# Patient Record
Sex: FEMALE | Race: WHITE | Employment: OTHER
[De-identification: names, ages, dates, MRNs, and addresses within clinical notes are randomized per-mention and may not be internally consistent; named-entity substitution may affect disease eponyms.]

---

## 2017-02-02 ENCOUNTER — TELEPHONE (OUTPATIENT)
Dept: FAMILY MEDICINE CLINIC | Facility: CLINIC | Age: 57
End: 2017-02-02

## 2017-02-10 ENCOUNTER — TELEPHONE (OUTPATIENT)
Dept: PSYCHOLOGY | Facility: CLINIC | Age: 57
End: 2017-02-10

## 2017-03-03 ENCOUNTER — OFFICE VISIT (OUTPATIENT)
Dept: FAMILY MEDICINE CLINIC | Facility: CLINIC | Age: 57
End: 2017-03-03

## 2017-03-03 VITALS
DIASTOLIC BLOOD PRESSURE: 68 MMHG | BODY MASS INDEX: 25.73 KG/M2 | WEIGHT: 169.2 LBS | TEMPERATURE: 97.2 F | HEART RATE: 70 BPM | RESPIRATION RATE: 17 BRPM | SYSTOLIC BLOOD PRESSURE: 100 MMHG

## 2017-03-03 DIAGNOSIS — F17.200 TOBACCO DEPENDENCE: Primary | ICD-10-CM

## 2017-03-03 PROCEDURE — 99213 OFFICE O/P EST LOW 20 MIN: CPT | Performed by: NURSE PRACTITIONER

## 2017-03-03 RX ORDER — NICOTINE 21 MG/24HR
1 PATCH, TRANSDERMAL 24 HOURS TRANSDERMAL EVERY 24 HOURS
Qty: 30 PATCH | Refills: 0 | Status: SHIPPED | OUTPATIENT
Start: 2017-03-03 | End: 2017-06-08 | Stop reason: SINTOL

## 2017-03-03 ASSESSMENT — ENCOUNTER SYMPTOMS
WHEEZING: 0
EYE DISCHARGE: 0
SORE THROAT: 0
DIARRHEA: 0
RHINORRHEA: 0
ABDOMINAL PAIN: 0
VOMITING: 0
EYE REDNESS: 0
SHORTNESS OF BREATH: 0
COUGH: 0

## 2017-03-20 ENCOUNTER — HOSPITAL ENCOUNTER (OUTPATIENT)
Dept: ULTRASOUND IMAGING | Age: 57
Discharge: HOME | End: 2017-03-20
Payer: MEDICAID

## 2017-03-20 ENCOUNTER — HOSPITAL ENCOUNTER (OUTPATIENT)
Age: 57
Discharge: HOME | End: 2017-03-20
Payer: MEDICAID

## 2017-03-20 DIAGNOSIS — M79.89 LEG SWELLING: ICD-10-CM

## 2017-03-20 PROCEDURE — 93971 EXTREMITY STUDY: CPT

## 2017-04-18 ENCOUNTER — OFFICE VISIT (OUTPATIENT)
Dept: FAMILY MEDICINE CLINIC | Age: 57
End: 2017-04-18
Payer: MEDICAID

## 2017-04-18 ENCOUNTER — TELEPHONE (OUTPATIENT)
Dept: FAMILY MEDICINE CLINIC | Age: 57
End: 2017-04-18

## 2017-04-18 VITALS
HEART RATE: 80 BPM | TEMPERATURE: 97 F | BODY MASS INDEX: 23.95 KG/M2 | DIASTOLIC BLOOD PRESSURE: 64 MMHG | WEIGHT: 158 LBS | HEIGHT: 68 IN | RESPIRATION RATE: 20 BRPM | SYSTOLIC BLOOD PRESSURE: 90 MMHG

## 2017-04-18 DIAGNOSIS — M79.671 RIGHT FOOT PAIN: ICD-10-CM

## 2017-04-18 DIAGNOSIS — M79.671 RIGHT FOOT PAIN: Primary | ICD-10-CM

## 2017-04-18 DIAGNOSIS — M79.671 FOOT PAIN, RIGHT: Primary | ICD-10-CM

## 2017-04-18 DIAGNOSIS — Z12.39 SCREENING FOR BREAST CANCER: ICD-10-CM

## 2017-04-18 DIAGNOSIS — Z23 NEED FOR PNEUMOCOCCAL VACCINATION: ICD-10-CM

## 2017-04-18 PROCEDURE — 99213 OFFICE O/P EST LOW 20 MIN: CPT | Performed by: NURSE PRACTITIONER

## 2017-04-18 PROCEDURE — 90471 IMMUNIZATION ADMIN: CPT | Performed by: NURSE PRACTITIONER

## 2017-04-18 PROCEDURE — 90732 PPSV23 VACC 2 YRS+ SUBQ/IM: CPT | Performed by: NURSE PRACTITIONER

## 2017-04-18 RX ORDER — LACTULOSE 10 G/15ML
20 SOLUTION ORAL DAILY PRN
COMMUNITY
Start: 2017-04-18 | End: 2018-04-18

## 2017-04-24 DIAGNOSIS — Z12.39 SCREENING FOR BREAST CANCER: ICD-10-CM

## 2017-04-26 DIAGNOSIS — Z95.828 S/P TIPS (TRANSJUGULAR INTRAHEPATIC PORTOSYSTEMIC SHUNT): ICD-10-CM

## 2017-04-26 DIAGNOSIS — K70.30 ALCOHOLIC CIRRHOSIS OF LIVER WITHOUT ASCITES (HCC): ICD-10-CM

## 2017-06-08 ENCOUNTER — OFFICE VISIT (OUTPATIENT)
Dept: FAMILY MEDICINE CLINIC | Age: 57
End: 2017-06-08
Payer: MEDICAID

## 2017-06-08 VITALS
HEIGHT: 68 IN | BODY MASS INDEX: 24.71 KG/M2 | RESPIRATION RATE: 20 BRPM | HEART RATE: 88 BPM | SYSTOLIC BLOOD PRESSURE: 100 MMHG | WEIGHT: 163 LBS | DIASTOLIC BLOOD PRESSURE: 72 MMHG | TEMPERATURE: 96.3 F

## 2017-06-08 DIAGNOSIS — Q82.8 ACCESSORY SKIN TAGS: ICD-10-CM

## 2017-06-08 DIAGNOSIS — F17.200 TOBACCO DEPENDENCE: Primary | ICD-10-CM

## 2017-06-08 PROCEDURE — 99213 OFFICE O/P EST LOW 20 MIN: CPT | Performed by: NURSE PRACTITIONER

## 2017-06-08 RX ORDER — VARENICLINE TARTRATE 25 MG
KIT ORAL
Qty: 42 TABLET | Refills: 0 | Status: SHIPPED | OUTPATIENT
Start: 2017-06-08 | End: 2017-08-02 | Stop reason: SDUPTHER

## 2017-06-08 ASSESSMENT — ENCOUNTER SYMPTOMS
WHEEZING: 0
EYE DISCHARGE: 0
TROUBLE SWALLOWING: 0
SHORTNESS OF BREATH: 0
EYE REDNESS: 0
COUGH: 0
SORE THROAT: 0
RHINORRHEA: 0

## 2017-06-08 ASSESSMENT — PATIENT HEALTH QUESTIONNAIRE - PHQ9
SUM OF ALL RESPONSES TO PHQ QUESTIONS 1-9: 0
1. LITTLE INTEREST OR PLEASURE IN DOING THINGS: 0
SUM OF ALL RESPONSES TO PHQ9 QUESTIONS 1 & 2: 0
2. FEELING DOWN, DEPRESSED OR HOPELESS: 0

## 2017-06-29 ENCOUNTER — OFFICE VISIT (OUTPATIENT)
Dept: FAMILY MEDICINE CLINIC | Age: 57
End: 2017-06-29
Payer: MEDICAID

## 2017-06-29 VITALS
HEART RATE: 74 BPM | WEIGHT: 162 LBS | RESPIRATION RATE: 16 BRPM | BODY MASS INDEX: 24.55 KG/M2 | SYSTOLIC BLOOD PRESSURE: 100 MMHG | HEIGHT: 68 IN | DIASTOLIC BLOOD PRESSURE: 68 MMHG

## 2017-06-29 DIAGNOSIS — D17.9 MULTIPLE LIPOMAS: Primary | ICD-10-CM

## 2017-06-29 PROCEDURE — 99212 OFFICE O/P EST SF 10 MIN: CPT | Performed by: INTERNAL MEDICINE

## 2017-06-29 RX ORDER — ALENDRONATE SODIUM 70 MG/1
70 TABLET ORAL
Qty: 4 TABLET | Refills: 3 | Status: CANCELLED | OUTPATIENT
Start: 2017-06-29

## 2017-06-29 RX ORDER — ALENDRONATE SODIUM 70 MG/1
70 TABLET ORAL
Qty: 4 TABLET | Refills: 3 | Status: ON HOLD | OUTPATIENT
Start: 2017-06-29 | End: 2022-01-19

## 2017-06-29 ASSESSMENT — ENCOUNTER SYMPTOMS
SHORTNESS OF BREATH: 0
COUGH: 0

## 2017-08-02 ENCOUNTER — OFFICE VISIT (OUTPATIENT)
Dept: FAMILY MEDICINE CLINIC | Age: 57
End: 2017-08-02
Payer: MEDICAID

## 2017-08-02 VITALS
SYSTOLIC BLOOD PRESSURE: 101 MMHG | WEIGHT: 163 LBS | OXYGEN SATURATION: 95 % | BODY MASS INDEX: 24.71 KG/M2 | RESPIRATION RATE: 20 BRPM | HEIGHT: 68 IN | DIASTOLIC BLOOD PRESSURE: 65 MMHG | TEMPERATURE: 97.6 F | HEART RATE: 80 BPM

## 2017-08-02 DIAGNOSIS — F17.200 TOBACCO DEPENDENCE: ICD-10-CM

## 2017-08-02 DIAGNOSIS — Q82.8 ACCESSORY SKIN TAGS: Primary | ICD-10-CM

## 2017-08-02 PROCEDURE — 99213 OFFICE O/P EST LOW 20 MIN: CPT | Performed by: NURSE PRACTITIONER

## 2017-08-02 PROCEDURE — 17110 DESTRUCTION B9 LES UP TO 14: CPT | Performed by: NURSE PRACTITIONER

## 2017-08-02 RX ORDER — VARENICLINE TARTRATE
KIT
Qty: 42 TABLET | Refills: 0 | OUTPATIENT
Start: 2017-08-02 | End: 2018-08-02

## 2017-08-02 RX ORDER — VARENICLINE TARTRATE 25 MG
KIT ORAL
Qty: 42 TABLET | Refills: 0 | Status: SHIPPED | OUTPATIENT
Start: 2017-08-02 | End: 2018-02-05

## 2017-08-02 ASSESSMENT — ENCOUNTER SYMPTOMS
SHORTNESS OF BREATH: 0
WHEEZING: 0
COUGH: 0

## 2018-01-08 ENCOUNTER — OFFICE VISIT (OUTPATIENT)
Dept: FAMILY MEDICINE CLINIC | Age: 58
End: 2018-01-08
Payer: MEDICAID

## 2018-01-08 VITALS
BODY MASS INDEX: 25.07 KG/M2 | OXYGEN SATURATION: 98 % | DIASTOLIC BLOOD PRESSURE: 64 MMHG | HEART RATE: 84 BPM | WEIGHT: 165.4 LBS | HEIGHT: 68 IN | TEMPERATURE: 97.8 F | SYSTOLIC BLOOD PRESSURE: 102 MMHG

## 2018-01-08 DIAGNOSIS — H00.015 HORDEOLUM EXTERNUM OF LEFT LOWER EYELID: Primary | ICD-10-CM

## 2018-01-08 PROCEDURE — 4004F PT TOBACCO SCREEN RCVD TLK: CPT | Performed by: NURSE PRACTITIONER

## 2018-01-08 PROCEDURE — G8484 FLU IMMUNIZE NO ADMIN: HCPCS | Performed by: NURSE PRACTITIONER

## 2018-01-08 PROCEDURE — 3014F SCREEN MAMMO DOC REV: CPT | Performed by: NURSE PRACTITIONER

## 2018-01-08 PROCEDURE — 99213 OFFICE O/P EST LOW 20 MIN: CPT | Performed by: NURSE PRACTITIONER

## 2018-01-08 PROCEDURE — G8427 DOCREV CUR MEDS BY ELIG CLIN: HCPCS | Performed by: NURSE PRACTITIONER

## 2018-01-08 PROCEDURE — G8419 CALC BMI OUT NRM PARAM NOF/U: HCPCS | Performed by: NURSE PRACTITIONER

## 2018-01-08 PROCEDURE — 3017F COLORECTAL CA SCREEN DOC REV: CPT | Performed by: NURSE PRACTITIONER

## 2018-01-08 RX ORDER — ERYTHROMYCIN 5 MG/G
OINTMENT OPHTHALMIC 3 TIMES DAILY
Qty: 1 TUBE | Refills: 0 | Status: SHIPPED | OUTPATIENT
Start: 2018-01-08 | End: 2018-01-15

## 2018-01-08 ASSESSMENT — ENCOUNTER SYMPTOMS
NAUSEA: 0
VOMITING: 0
EYE ITCHING: 1
SORE THROAT: 0
COUGH: 0
EYE DISCHARGE: 0
SHORTNESS OF BREATH: 0
SWOLLEN GLANDS: 0
EYE PAIN: 1
CHEST TIGHTNESS: 0
EYE REDNESS: 1
ABDOMINAL PAIN: 0
RHINORRHEA: 0
ALLERGIC/IMMUNOLOGIC NEGATIVE: 1
STRIDOR: 0
DIARRHEA: 0
DOUBLE VISION: 0
PHOTOPHOBIA: 1

## 2018-01-08 NOTE — PROGRESS NOTES
[id number]  [address] [place] [id number]  Dept: [phone]  Dept Fax: [phone]    Nannette Mccauley is a 66 y.o. female who presents today for her medical conditions/complaints as noted below. Nannette Mccauley is c/o of   Chief Complaint   Patient presents with    Conjunctivitis     watery itchy    Stye         HPI:     Conjunctivitis    The current episode started 2 days ago (L eye ). The onset was sudden. The problem occurs rarely. The problem has been unchanged. The problem is moderate. Relieved by: warm compress  The symptoms are aggravated by activity. Associated symptoms include eye itching, photophobia, eye pain and eye redness. Pertinent negatives include no fever, no decreased vision, no double vision, no abdominal pain, no diarrhea, no nausea, no vomiting, no congestion, no ear discharge, no ear pain, no headaches, no hearing loss, no mouth sores, no rhinorrhea, no sore throat, no stridor, no swollen glands, no neck pain, no cough, no URI, no rash and no eye discharge.          Past Medical History:   Diagnosis Date    Alcohol abuse, unspecified     Alcoholic cirrhosis of liver (HCC)     Anxiety     Asthma     Bipolar disorder ([address]     Cirrhosis of liver ([address]     Constipation     Depression     GERD (gastroesophageal reflux disease)     Hepatic encephalopathy (HCC)     Hepatitis C     Pancreatitis, alcoholic     Pulmonary hypertension     Seizures ([address]     5 in the past related to alcohol withdrawal      Past Surgical History:   Procedure Laterality Date     SECTION      CHOLECYSTECTOMY      COLONOSCOPY  14    NORMAL    HYSTERECTOMY      KNEE ARTHROSCOPY Left 2016    Arthroscopic Chondroplasty, Lateral meniscectomy    UPPER GASTROINTESTINAL ENDOSCOPY  14    SCHATZKI RING, MILD CHRONIC GASTRITIS    UPPER GASTROINTESTINAL ENDOSCOPY  2015    MILD CHRONIC GASTRITIS       Family History Problem Relation Age of Onset    Cancer Father      Bladder    Cancer Brother        Social History   Substance Use Topics    Smoking status: Current Every Day Smoker     Packs/day: 0.25     Years: 33.00     Types: Cigarettes    Smokeless tobacco: Never Used      Comment: about 5 cigs a day, trying to quit    Alcohol use 0.0 oz/week      Comment: 05/11/15      Current Outpatient Prescriptions   Medication Sig Dispense Refill    erythromycin (ROMYCIN) 5 MG/GM ophthalmic ointment Place into the left eye 3 times daily for 7 days 1 Tube 0    varenicline (CHANTIX STARTING MONTH PAK) 0.5 MG X 11 & 1 MG X 42 tablet Take by mouth. 42 tablet 0    alendronate (FOSAMAX) 70 MG tablet Take 1 tablet by mouth every 7 days 4 tablet 3    lactulose (CHRONULAC) 10 GM/15ML solution Take 20 g by mouth daily as needed      bisacodyl (BISAC-EVAC) 5 MG EC tablet Take 1 tablet by mouth daily as needed for Constipation 30 tablet 3    oxybutynin (DITROPAN XL) 10 MG CR tablet Take 1 tablet by mouth daily 30 tablet 5    calcium carbonate 600 MG TABS tablet Take 1 tablet by mouth daily      vitamin B-12 (CYANOCOBALAMIN) 1000 MCG tablet Take 1,000 mcg by mouth daily      furosemide (LASIX) 40 MG tablet Take 1 tablet by mouth daily 60 tablet 3    spironolactone (ALDACTONE) 100 MG tablet Take 1 tablet by mouth daily 30 tablet 3    magnesium oxide (MAG-OX) 400 MG tablet Take 400 mg by mouth 2 times daily. 2 tabs twice a day      traZODone (DESYREL) 50 MG tablet Take 1 tablet by mouth nightly as needed for Sleep. (Patient taking differently: Take 100 mg by mouth nightly as needed for Sleep ) 30 tablet 0    QUEtiapine (SEROQUEL) 300 MG tablet Take 1 tablet by mouth nightly. 30 tablet 0     No current facility-administered medications for this visit. Allergies   Allergen Reactions    Penicillins            Subjective:      Review of Systems   Constitutional: Negative for appetite change, chills, fatigue and fever.    HENT: Negative for congestion, ear discharge, ear pain, hearing loss, mouth sores, postnasal drip, rhinorrhea and sore throat. Eyes: Positive for photophobia, pain, redness and itching. Negative for double vision and discharge. Respiratory: Negative for cough, chest tightness, shortness of breath and stridor. Cardiovascular: Negative for chest pain, palpitations and leg swelling. Gastrointestinal: Negative for abdominal pain, diarrhea, nausea and vomiting. Endocrine: Negative. Genitourinary: Negative for dysuria, frequency and urgency. Musculoskeletal: Negative for neck pain and neck stiffness. Skin: Negative for rash. Allergic/Immunologic: Negative. Neurological: Negative for dizziness, weakness, light-headedness and headaches. Hematological: Negative for adenopathy. Psychiatric/Behavioral: Negative for confusion. Objective:     Physical Exam   Constitutional: She is oriented to person, place, and time. Vital signs are normal. She appears well-developed and well-nourished. HENT:   Head: Normocephalic. Right Ear: External ear normal.   Left Ear: External ear normal.   Nose: Nose normal.   Mouth/Throat: Oropharynx is clear and moist.   Eyes: EOM are normal. Pupils are equal, round, and reactive to light. Left eye exhibits hordeolum. Left conjunctiva is injected. Left eye exhibits no nystagmus. Left pupil is round and reactive. Pupils are equal.   Neck: Normal range of motion. Neck supple. Cardiovascular: Normal rate, regular rhythm and normal heart sounds. Exam reveals no gallop and no friction rub. No murmur heard. Pulmonary/Chest: Effort normal and breath sounds normal. No respiratory distress. Abdominal: Soft. Bowel sounds are normal.   Musculoskeletal: Normal range of motion. Lymphadenopathy:     She has no cervical adenopathy. Neurological: She is alert and oriented to person, place, and time. She has normal reflexes. No cranial nerve deficit.  Coordination normal. Skin: Skin is warm and dry. No rash noted. Psychiatric: She has a normal mood and affect. Thought content normal.   Vitals reviewed. /64   Pulse 84   Temp 97.8 °F (36.6 °C) (Oral)   Ht 5' 8\" (1.727 m)   Wt 165 lb 6.4 oz (75 kg)   LMP 07/19/2006   SpO2 98%   PF 97 L/min   BMI 25.15 kg/m²     Assessment:      1. Hordeolum externum of left lower eyelid  erythromycin (ROMYCIN) 5 MG/GM ophthalmic ointment             Plan:     1.) Continue with warm compresses   2.) Tylenol or Motrin PRN  3.) Start Romycin   4.) Follow-up with PCP for persistent or worsening of symptoms     Problem List     None           Patient given educational materials - see patient instructions. Discussed use, benefit, and side effects of prescribed medications. All patient questions answered. Pt verbalized understanding. Instructed to continue current medications, diet and exercise. Patient agreed with treatment plan. Follow up as directed.      Electronically signed by Doris Rios CNP on 1/8/2018 at 3:55 PM

## 2018-01-29 ENCOUNTER — TELEPHONE (OUTPATIENT)
Dept: FAMILY MEDICINE CLINIC | Age: 58
End: 2018-01-29

## 2018-01-29 NOTE — TELEPHONE ENCOUNTER
Will need to be seen in the office for pain medication-recommend discussing at upcoming appointment.

## 2018-02-05 ENCOUNTER — HOSPITAL ENCOUNTER (OUTPATIENT)
Age: 58
Setting detail: SPECIMEN
Discharge: HOME | End: 2018-02-05
Payer: MEDICAID

## 2018-02-05 ENCOUNTER — OFFICE VISIT (OUTPATIENT)
Dept: FAMILY MEDICINE CLINIC | Age: 58
End: 2018-02-05
Payer: MEDICAID

## 2018-02-05 VITALS
TEMPERATURE: 98.8 F | BODY MASS INDEX: 25.33 KG/M2 | RESPIRATION RATE: 16 BRPM | HEART RATE: 84 BPM | SYSTOLIC BLOOD PRESSURE: 110 MMHG | WEIGHT: 166.6 LBS | DIASTOLIC BLOOD PRESSURE: 60 MMHG

## 2018-02-05 DIAGNOSIS — K85.20 ALCOHOL-INDUCED ACUTE PANCREATITIS, UNSPECIFIED COMPLICATION STATUS: Primary | ICD-10-CM

## 2018-02-05 DIAGNOSIS — R10.84 GENERALIZED ABDOMINAL PAIN: ICD-10-CM

## 2018-02-05 DIAGNOSIS — K59.00 CONSTIPATION, UNSPECIFIED CONSTIPATION TYPE: ICD-10-CM

## 2018-02-05 DIAGNOSIS — R79.89 ELEVATED LFTS: ICD-10-CM

## 2018-02-05 DIAGNOSIS — N30.00 ACUTE CYSTITIS WITHOUT HEMATURIA: ICD-10-CM

## 2018-02-05 DIAGNOSIS — D69.6 THROMBOCYTOPENIA (HCC): ICD-10-CM

## 2018-02-05 DIAGNOSIS — K70.30 ALCOHOLIC CIRRHOSIS OF LIVER WITHOUT ASCITES (HCC): ICD-10-CM

## 2018-02-05 DIAGNOSIS — B18.2 CHRONIC HEPATITIS C WITHOUT HEPATIC COMA (HCC): Chronic | ICD-10-CM

## 2018-02-05 LAB
BILIRUBIN URINE: NEGATIVE
COLOR: YELLOW
COMMENT UA: NORMAL
GLUCOSE URINE: NEGATIVE
KETONES, URINE: NEGATIVE
LEUKOCYTE ESTERASE, URINE: NEGATIVE
NITRITE, URINE: NEGATIVE
PH UA: 7 (ref 5–8)
PROTEIN UA: NEGATIVE
SPECIFIC GRAVITY UA: 1.01 (ref 1–1.03)
TURBIDITY: CLEAR
URINE HGB: NEGATIVE
UROBILINOGEN, URINE: NORMAL

## 2018-02-05 PROCEDURE — 1111F DSCHRG MED/CURRENT MED MERGE: CPT | Performed by: NURSE PRACTITIONER

## 2018-02-05 PROCEDURE — 99495 TRANSJ CARE MGMT MOD F2F 14D: CPT | Performed by: NURSE PRACTITIONER

## 2018-02-05 RX ORDER — TRAZODONE HYDROCHLORIDE 100 MG/1
TABLET ORAL
Refills: 0 | Status: ON HOLD | COMMUNITY
Start: 2018-01-30 | End: 2019-06-26 | Stop reason: ALTCHOICE

## 2018-02-05 RX ORDER — DOCUSATE SODIUM 100 MG/1
100 CAPSULE, LIQUID FILLED ORAL 3 TIMES DAILY PRN
Qty: 60 CAPSULE | Refills: 1 | Status: SHIPPED | OUTPATIENT
Start: 2018-02-05 | End: 2018-04-06

## 2018-02-05 RX ORDER — IBUPROFEN 800 MG/1
800 TABLET ORAL EVERY 8 HOURS PRN
Qty: 90 TABLET | Refills: 0 | Status: ON HOLD | OUTPATIENT
Start: 2018-02-05 | End: 2022-01-21 | Stop reason: HOSPADM

## 2018-02-05 ASSESSMENT — ENCOUNTER SYMPTOMS
ABDOMINAL PAIN: 1
EYE REDNESS: 0
COUGH: 0
SHORTNESS OF BREATH: 0
WHEEZING: 0
EYE DISCHARGE: 0
VOMITING: 0
NAUSEA: 0
CONSTIPATION: 1
SORE THROAT: 0
BLOOD IN STOOL: 0
EYE PAIN: 0
SINUS PAIN: 0
DIARRHEA: 0

## 2018-02-05 NOTE — PROGRESS NOTES
110/60   01/08/18 102/64   08/02/17 101/65        Inpatient course: Discharge summary reviewed- see chart. Chief Complaint   Patient presents with    Follow-Up from Hospital     History of Present illness - Follow up of Hospital diagnosis(es): patient presents in office today for hospital follow up. Went to [facility] ED on 1/23/18 and discharged home on 1/26/18. Presented to ER with concerns about abdominal pains mostly to upper mid left stomach. History of pancreatitis and alcohol cirrhosis. She was not discharged home with any pain medication. Sister gave her few ibuprofen 800 mg which she has been taking at bedtime to help her sleep. No fevers. Has not scheduled follow up with GI. Last saw about 4 months ago. She had been drinking beer for about 1 week prior to symptoms starting. Reports drinking 2 Tall boys per day. Last alcoholic beverage day before she was admitted. LFTs very high but did trend down. CT abdomen/pelvis essentially normal. Had MRCP which was essentially normal. Had positive Hepatitis C antibody. RNA results were pending at discharge. No results available in office. Was found to have UTI and treated with Rocephin. Discharged home with Keflex which she has completed. Last dose yesterday. Denies urinary symptoms. Has not been able to eat spicy foods. Cannot drink [employer]. Drinking 7 Up or grape pop with vanilla ice cream.       Non face to face  following discharge, date last encounter closed (first attempt may have been earlier): 1/29/2018  3:24 PM    Call initiated 2 business days of discharge: Yes     Interval history/Current status: stable    Review of Systems   Constitutional: Negative for chills, fever and weight loss. HENT: Negative for ear pain, sinus pain and sore throat. Eyes: Negative for pain, discharge and redness. Respiratory: Negative for cough, shortness of breath and wheezing. Cardiovascular: Negative for chest pain, palpitations and leg swelling.    Gastrointestinal: lipase to ensure normal or at least improving if high in hospital  Discussed importance of no alcohol    Alcoholic cirrhosis of liver without ascites ([facility]      Discussed referral to counselor or behavioral specialist to help control alcohol drinking. She does not feel she needs at this time. Follow up with GI      Acute cystitis without hematuria    Completed Keflex as prescribed by hospital  Will repeat UA/culture to ensure UTI resolved  Ensure adequate fluid intake  UA W/Reflex Culture; Future  -     CT DISCHARGE MEDS RECONCILED W/ CURRENT OUTPATIENT MED LIST    Generalized abdominal pain  -     Hepatic Function Panel; Future  Will use ibuprofen as needed for pain. No narcotic pain medication at this time. Follow up with GI- needs to schedule appt. Elevated LFTs  -     Hepatic Function Panel; Future- repeat testing to ensure continues to titrate down  Limit tylenol and alcohol     Chronic hepatitis C without hepatic coma (HCC)     Stable. Will monitor symptoms. Recommend follow up with GI. Discussed importance of no alcohol   No lab results available in office today. Will obtain and review. Thrombocytopenia ([facility]  -     CBC;  Future      Constipation   Colace and Bisacodyl as needed   Recommend adequate fluid intake   Follow up with GI      Medical Decision Making: moderate complexity

## 2018-02-15 ENCOUNTER — HOSPITAL ENCOUNTER (OUTPATIENT)
Age: 58
Setting detail: SPECIMEN
Discharge: HOME | End: 2018-02-15
Payer: MEDICAID

## 2018-02-15 DIAGNOSIS — R79.89 ELEVATED LFTS: ICD-10-CM

## 2018-02-15 DIAGNOSIS — R10.84 GENERALIZED ABDOMINAL PAIN: ICD-10-CM

## 2018-02-15 DIAGNOSIS — K85.20 ALCOHOL-INDUCED ACUTE PANCREATITIS, UNSPECIFIED COMPLICATION STATUS: ICD-10-CM

## 2018-02-15 DIAGNOSIS — D69.6 THROMBOCYTOPENIA (HCC): ICD-10-CM

## 2018-02-15 LAB
ABSOLUTE EOS #: 0.25 K/UL (ref 0–0.44)
ABSOLUTE IMMATURE GRANULOCYTE: <0.03 K/UL (ref 0–0.3)
ABSOLUTE LYMPH #: 1.02 K/UL (ref 1.1–3.7)
ABSOLUTE MONO #: 0.37 K/UL (ref 0.1–1.2)
ALBUMIN SERPL-MCNC: 3.6 G/DL (ref 3.5–5.2)
ALBUMIN SERPL-MCNC: 3.6 G/DL (ref 3.5–5.2)
ALBUMIN/GLOBULIN RATIO: 1 (ref 1–2.5)
ALBUMIN/GLOBULIN RATIO: 1.1 (ref 1–2.5)
ALP BLD-CCNC: 211 U/L (ref 35–104)
ALP BLD-CCNC: 218 U/L (ref 35–104)
ALT SERPL-CCNC: 22 U/L (ref 5–33)
ALT SERPL-CCNC: 22 U/L (ref 5–33)
AMYLASE: 194 U/L (ref 28–100)
ANION GAP SERPL CALCULATED.3IONS-SCNC: 22 MMOL/L (ref 9–17)
AST SERPL-CCNC: 38 U/L
AST SERPL-CCNC: 39 U/L
BASOPHILS # BLD: 1 % (ref 0–2)
BASOPHILS ABSOLUTE: 0.03 K/UL (ref 0–0.2)
BILIRUB SERPL-MCNC: 1.92 MG/DL (ref 0.3–1.2)
BILIRUB SERPL-MCNC: 1.95 MG/DL (ref 0.3–1.2)
BILIRUBIN DIRECT: 0.93 MG/DL
BILIRUBIN, INDIRECT: 1.02 MG/DL (ref 0–1)
BUN BLDV-MCNC: 18 MG/DL (ref 6–20)
BUN/CREAT BLD: ABNORMAL (ref 9–20)
CALCIUM SERPL-MCNC: 9.7 MG/DL (ref 8.6–10.4)
CHLORIDE BLD-SCNC: 112 MMOL/L (ref 98–107)
CHOLESTEROL, FASTING: 166 MG/DL
CHOLESTEROL/HDL RATIO: 2.5
CO2: 15 MMOL/L (ref 20–31)
CREAT SERPL-MCNC: 0.82 MG/DL (ref 0.5–0.9)
DIFFERENTIAL TYPE: ABNORMAL
EOSINOPHILS RELATIVE PERCENT: 6 % (ref 1–4)
ESTIMATED AVERAGE GLUCOSE: 94 MG/DL
GFR AFRICAN AMERICAN: >60 ML/MIN
GFR NON-AFRICAN AMERICAN: >60 ML/MIN
GFR SERPL CREATININE-BSD FRML MDRD: ABNORMAL ML/MIN/{1.73_M2}
GFR SERPL CREATININE-BSD FRML MDRD: ABNORMAL ML/MIN/{1.73_M2}
GLOBULIN: ABNORMAL G/DL (ref 1.5–3.8)
GLUCOSE FASTING: 94 MG/DL (ref 70–99)
HBA1C MFR BLD: 4.9 % (ref 4–6)
HCT VFR BLD CALC: 42.8 % (ref 36.3–47.1)
HCT VFR BLD CALC: 42.8 % (ref 36.3–47.1)
HDLC SERPL-MCNC: 66 MG/DL
HEMOGLOBIN: 14.9 G/DL (ref 11.9–15.1)
HEMOGLOBIN: 14.9 G/DL (ref 11.9–15.1)
IMMATURE GRANULOCYTES: 1 %
LDL CHOLESTEROL: 81 MG/DL (ref 0–130)
LIPASE: 93 U/L (ref 13–60)
LYMPHOCYTES # BLD: 25 % (ref 24–43)
MCH RBC QN AUTO: 34.5 PG (ref 25.2–33.5)
MCH RBC QN AUTO: 34.5 PG (ref 25.2–33.5)
MCHC RBC AUTO-ENTMCNC: 34.8 G/DL (ref 28.4–34.8)
MCHC RBC AUTO-ENTMCNC: 34.8 G/DL (ref 28.4–34.8)
MCV RBC AUTO: 99.1 FL (ref 82.6–102.9)
MCV RBC AUTO: 99.1 FL (ref 82.6–102.9)
MONOCYTES # BLD: 9 % (ref 3–12)
NRBC AUTOMATED: 0 PER 100 WBC
NRBC AUTOMATED: 0 PER 100 WBC
PDW BLD-RTO: 11.9 % (ref 11.8–14.4)
PDW BLD-RTO: 11.9 % (ref 11.8–14.4)
PLATELET # BLD: 95 K/UL (ref 138–453)
PLATELET # BLD: 95 K/UL (ref 138–453)
PLATELET ESTIMATE: ABNORMAL
PMV BLD AUTO: 10.3 FL (ref 8.1–13.5)
PMV BLD AUTO: 10.3 FL (ref 8.1–13.5)
POTASSIUM SERPL-SCNC: 4.8 MMOL/L (ref 3.7–5.3)
RBC # BLD: 4.32 M/UL (ref 3.95–5.11)
RBC # BLD: 4.32 M/UL (ref 3.95–5.11)
RBC # BLD: ABNORMAL 10*6/UL
SEG NEUTROPHILS: 58 % (ref 36–65)
SEGMENTED NEUTROPHILS ABSOLUTE COUNT: 2.38 K/UL (ref 1.5–8.1)
SODIUM BLD-SCNC: 149 MMOL/L (ref 135–144)
TOTAL PROTEIN: 6.9 G/DL (ref 6.4–8.3)
TOTAL PROTEIN: 7.3 G/DL (ref 6.4–8.3)
TRIGLYCERIDE, FASTING: 96 MG/DL
TSH SERPL DL<=0.05 MIU/L-ACNC: 2.76 MIU/L (ref 0.3–5)
VLDLC SERPL CALC-MCNC: NORMAL MG/DL (ref 1–30)
WBC # BLD: 4.1 K/UL (ref 3.5–11.3)
WBC # BLD: 4.1 K/UL (ref 3.5–11.3)
WBC # BLD: ABNORMAL 10*3/UL

## 2018-02-20 ENCOUNTER — OFFICE VISIT (OUTPATIENT)
Dept: GASTROENTEROLOGY | Age: 58
End: 2018-02-20
Payer: MEDICAID

## 2018-02-20 VITALS
HEIGHT: 68 IN | SYSTOLIC BLOOD PRESSURE: 110 MMHG | DIASTOLIC BLOOD PRESSURE: 66 MMHG | HEART RATE: 77 BPM | BODY MASS INDEX: 25.31 KG/M2 | WEIGHT: 167 LBS | RESPIRATION RATE: 14 BRPM | OXYGEN SATURATION: 97 %

## 2018-02-20 DIAGNOSIS — R93.5 ABNORMAL MRI OF ABDOMEN: ICD-10-CM

## 2018-02-20 DIAGNOSIS — B18.2 CHRONIC HEPATITIS C WITHOUT HEPATIC COMA (HCC): ICD-10-CM

## 2018-02-20 DIAGNOSIS — Z95.828 S/P TIPS (TRANSJUGULAR INTRAHEPATIC PORTOSYSTEMIC SHUNT): ICD-10-CM

## 2018-02-20 DIAGNOSIS — K70.30 ALCOHOLIC CIRRHOSIS OF LIVER WITHOUT ASCITES (HCC): Primary | ICD-10-CM

## 2018-02-20 DIAGNOSIS — K59.09 OTHER CONSTIPATION: ICD-10-CM

## 2018-02-20 DIAGNOSIS — K86.1 CHRONIC PANCREATITIS, UNSPECIFIED PANCREATITIS TYPE (HCC): ICD-10-CM

## 2018-02-20 PROCEDURE — G8484 FLU IMMUNIZE NO ADMIN: HCPCS | Performed by: INTERNAL MEDICINE

## 2018-02-20 PROCEDURE — G8427 DOCREV CUR MEDS BY ELIG CLIN: HCPCS | Performed by: INTERNAL MEDICINE

## 2018-02-20 PROCEDURE — G8419 CALC BMI OUT NRM PARAM NOF/U: HCPCS | Performed by: INTERNAL MEDICINE

## 2018-02-20 PROCEDURE — 4004F PT TOBACCO SCREEN RCVD TLK: CPT | Performed by: INTERNAL MEDICINE

## 2018-02-20 PROCEDURE — 99214 OFFICE O/P EST MOD 30 MIN: CPT | Performed by: INTERNAL MEDICINE

## 2018-02-20 PROCEDURE — 3014F SCREEN MAMMO DOC REV: CPT | Performed by: INTERNAL MEDICINE

## 2018-02-20 PROCEDURE — 3017F COLORECTAL CA SCREEN DOC REV: CPT | Performed by: INTERNAL MEDICINE

## 2018-02-20 ASSESSMENT — ENCOUNTER SYMPTOMS
SHORTNESS OF BREATH: 0
WHEEZING: 0
ABDOMINAL PAIN: 1
BACK PAIN: 0
COUGH: 0
TROUBLE SWALLOWING: 0
ABDOMINAL DISTENTION: 1
CONSTIPATION: 0
EYES NEGATIVE: 1
BLOOD IN STOOL: 0
ALLERGIC/IMMUNOLOGIC NEGATIVE: 1
RECTAL PAIN: 0
DIARRHEA: 0
VOMITING: 0
RESPIRATORY NEGATIVE: 1
ANAL BLEEDING: 0
SINUS PRESSURE: 0
NAUSEA: 0

## 2018-02-21 ENCOUNTER — TELEPHONE (OUTPATIENT)
Dept: GASTROENTEROLOGY | Age: 58
End: 2018-02-21

## 2018-03-08 ENCOUNTER — APPOINTMENT (OUTPATIENT)
Dept: GENERAL RADIOLOGY | Age: 58
End: 2018-03-08
Attending: INTERNAL MEDICINE
Payer: MEDICAID

## 2018-03-08 ENCOUNTER — HOSPITAL ENCOUNTER (OUTPATIENT)
Age: 58
Discharge: HOME | End: 2018-03-08
Attending: INTERNAL MEDICINE
Payer: MEDICAID

## 2018-03-08 ENCOUNTER — ANESTHESIA (OUTPATIENT)
Dept: OPERATING ROOM | Age: 58
End: 2018-03-08
Payer: MEDICAID

## 2018-03-08 ENCOUNTER — HOSPITAL ENCOUNTER (OUTPATIENT)
Age: 58
Setting detail: SURGERY ADMIT
Discharge: HOME | End: 2018-03-08
Attending: INTERNAL MEDICINE | Admitting: INTERNAL MEDICINE
Payer: MEDICAID

## 2018-03-08 ENCOUNTER — ANESTHESIA EVENT (OUTPATIENT)
Dept: OPERATING ROOM | Age: 58
End: 2018-03-08
Payer: MEDICAID

## 2018-03-08 VITALS
WEIGHT: 167 LBS | RESPIRATION RATE: 16 BRPM | OXYGEN SATURATION: 92 % | DIASTOLIC BLOOD PRESSURE: 68 MMHG | BODY MASS INDEX: 25.31 KG/M2 | SYSTOLIC BLOOD PRESSURE: 123 MMHG | TEMPERATURE: 96.5 F | HEART RATE: 99 BPM | HEIGHT: 68 IN

## 2018-03-08 VITALS
RESPIRATION RATE: 9 BRPM | OXYGEN SATURATION: 99 % | SYSTOLIC BLOOD PRESSURE: 97 MMHG | DIASTOLIC BLOOD PRESSURE: 51 MMHG

## 2018-03-08 DIAGNOSIS — B18.2 CHRONIC HEPATITIS C WITHOUT HEPATIC COMA (HCC): ICD-10-CM

## 2018-03-08 DIAGNOSIS — R52 PAIN: ICD-10-CM

## 2018-03-08 LAB
ABSOLUTE EOS #: 0.03 K/UL (ref 0–0.4)
ABSOLUTE IMMATURE GRANULOCYTE: ABNORMAL K/UL (ref 0–0.3)
ABSOLUTE LYMPH #: 0.71 K/UL (ref 1–4.8)
ABSOLUTE MONO #: 0.41 K/UL (ref 0.1–1.3)
AFP: 4.1 UG/L
ALBUMIN SERPL-MCNC: 4 G/DL (ref 3.5–5.2)
ALBUMIN/GLOBULIN RATIO: ABNORMAL (ref 1–2.5)
ALP BLD-CCNC: 314 U/L (ref 35–104)
ALT SERPL-CCNC: 20 U/L (ref 5–33)
AST SERPL-CCNC: 35 U/L
BASOPHILS # BLD: 0 % (ref 0–2)
BASOPHILS ABSOLUTE: 0 K/UL (ref 0–0.2)
BILIRUB SERPL-MCNC: 2.55 MG/DL (ref 0.3–1.2)
BILIRUBIN DIRECT: 1.03 MG/DL
BILIRUBIN, INDIRECT: 1.52 MG/DL (ref 0–1)
DIFFERENTIAL TYPE: ABNORMAL
EOSINOPHILS RELATIVE PERCENT: 1 % (ref 0–4)
GLOBULIN: ABNORMAL G/DL (ref 1.5–3.8)
HCT VFR BLD CALC: 41 % (ref 36–46)
HEMOGLOBIN: 14.4 G/DL (ref 12–16)
IMMATURE GRANULOCYTES: ABNORMAL %
INR BLD: 1.2
LYMPHOCYTES # BLD: 21 % (ref 24–44)
MCH RBC QN AUTO: 35.9 PG (ref 26–34)
MCHC RBC AUTO-ENTMCNC: 35.1 G/DL (ref 31–37)
MCV RBC AUTO: 102.1 FL (ref 80–100)
MONOCYTES # BLD: 12 % (ref 1–7)
MORPHOLOGY: NORMAL
NRBC AUTOMATED: ABNORMAL PER 100 WBC
PDW BLD-RTO: 14 % (ref 11.5–14.9)
PLATELET # BLD: 71 K/UL (ref 150–450)
PLATELET ESTIMATE: ABNORMAL
PMV BLD AUTO: 8.2 FL (ref 6–12)
PROTHROMBIN TIME: 12 SEC (ref 9.7–12)
RBC # BLD: 4.01 M/UL (ref 4–5.2)
RBC # BLD: ABNORMAL 10*6/UL
SEG NEUTROPHILS: 66 % (ref 36–66)
SEGMENTED NEUTROPHILS ABSOLUTE COUNT: 2.25 K/UL (ref 1.3–9.1)
TOTAL PROTEIN: 7.1 G/DL (ref 6.4–8.3)
WBC # BLD: 3.4 K/UL (ref 3.5–11)
WBC # BLD: ABNORMAL 10*3/UL

## 2018-03-08 PROCEDURE — 74330 X-RAY BILE/PANC ENDOSCOPY: CPT

## 2018-03-08 PROCEDURE — 36415 COLL VENOUS BLD VENIPUNCTURE: CPT

## 2018-03-08 PROCEDURE — 85025 COMPLETE CBC W/AUTO DIFF WBC: CPT

## 2018-03-08 PROCEDURE — 6360000004 HC RX CONTRAST MEDICATION: Performed by: INTERNAL MEDICINE

## 2018-03-08 PROCEDURE — 3700000001 HC ADD 15 MINUTES (ANESTHESIA): Performed by: INTERNAL MEDICINE

## 2018-03-08 PROCEDURE — 43264 ERCP REMOVE DUCT CALCULI: CPT | Performed by: INTERNAL MEDICINE

## 2018-03-08 PROCEDURE — 7100000001 HC PACU RECOVERY - ADDTL 15 MIN: Performed by: INTERNAL MEDICINE

## 2018-03-08 PROCEDURE — 43262 ENDO CHOLANGIOPANCREATOGRAPH: CPT | Performed by: INTERNAL MEDICINE

## 2018-03-08 PROCEDURE — 7100000031 HC ASPR PHASE II RECOVERY - ADDTL 15 MIN: Performed by: INTERNAL MEDICINE

## 2018-03-08 PROCEDURE — 88305 TISSUE EXAM BY PATHOLOGIST: CPT

## 2018-03-08 PROCEDURE — 6360000002 HC RX W HCPCS: Performed by: NURSE ANESTHETIST, CERTIFIED REGISTERED

## 2018-03-08 PROCEDURE — 2580000003 HC RX 258: Performed by: ANESTHESIOLOGY

## 2018-03-08 PROCEDURE — 7100000000 HC PACU RECOVERY - FIRST 15 MIN: Performed by: INTERNAL MEDICINE

## 2018-03-08 PROCEDURE — 85610 PROTHROMBIN TIME: CPT

## 2018-03-08 PROCEDURE — 82105 ALPHA-FETOPROTEIN SERUM: CPT

## 2018-03-08 PROCEDURE — 87522 HEPATITIS C REVRS TRNSCRPJ: CPT

## 2018-03-08 PROCEDURE — 2500000003 HC RX 250 WO HCPCS: Performed by: NURSE ANESTHETIST, CERTIFIED REGISTERED

## 2018-03-08 PROCEDURE — 3609014900 HC ERCP W/SPHINCTEROTOMY &/OR PAPILLOTOMY: Performed by: INTERNAL MEDICINE

## 2018-03-08 PROCEDURE — 2720000010 HC SURG SUPPLY STERILE: Performed by: INTERNAL MEDICINE

## 2018-03-08 PROCEDURE — 3700000000 HC ANESTHESIA ATTENDED CARE: Performed by: INTERNAL MEDICINE

## 2018-03-08 PROCEDURE — 7100000030 HC ASPR PHASE II RECOVERY - FIRST 15 MIN: Performed by: INTERNAL MEDICINE

## 2018-03-08 PROCEDURE — 6360000002 HC RX W HCPCS

## 2018-03-08 PROCEDURE — 80076 HEPATIC FUNCTION PANEL: CPT

## 2018-03-08 RX ORDER — SODIUM CHLORIDE, SODIUM LACTATE, POTASSIUM CHLORIDE, CALCIUM CHLORIDE 600; 310; 30; 20 MG/100ML; MG/100ML; MG/100ML; MG/100ML
INJECTION, SOLUTION INTRAVENOUS CONTINUOUS
Status: DISCONTINUED | OUTPATIENT
Start: 2018-03-08 | End: 2018-03-08 | Stop reason: HOSPADM

## 2018-03-08 RX ORDER — FENTANYL CITRATE 50 UG/ML
INJECTION, SOLUTION INTRAMUSCULAR; INTRAVENOUS PRN
Status: DISCONTINUED | OUTPATIENT
Start: 2018-03-08 | End: 2018-03-08 | Stop reason: SDUPTHER

## 2018-03-08 RX ORDER — LIDOCAINE HYDROCHLORIDE 10 MG/ML
INJECTION, SOLUTION EPIDURAL; INFILTRATION; INTRACAUDAL; PERINEURAL PRN
Status: DISCONTINUED | OUTPATIENT
Start: 2018-03-08 | End: 2018-03-08 | Stop reason: SDUPTHER

## 2018-03-08 RX ORDER — 0.9 % SODIUM CHLORIDE 0.9 %
500 INTRAVENOUS SOLUTION INTRAVENOUS
Status: DISCONTINUED | OUTPATIENT
Start: 2018-03-08 | End: 2018-03-08 | Stop reason: HOSPADM

## 2018-03-08 RX ORDER — FENTANYL CITRATE 50 UG/ML
25 INJECTION, SOLUTION INTRAMUSCULAR; INTRAVENOUS EVERY 5 MIN PRN
Status: DISCONTINUED | OUTPATIENT
Start: 2018-03-08 | End: 2018-03-08 | Stop reason: HOSPADM

## 2018-03-08 RX ORDER — MIDAZOLAM HYDROCHLORIDE 1 MG/ML
INJECTION INTRAMUSCULAR; INTRAVENOUS PRN
Status: DISCONTINUED | OUTPATIENT
Start: 2018-03-08 | End: 2018-03-08 | Stop reason: SDUPTHER

## 2018-03-08 RX ORDER — ROCURONIUM BROMIDE 10 MG/ML
INJECTION, SOLUTION INTRAVENOUS PRN
Status: DISCONTINUED | OUTPATIENT
Start: 2018-03-08 | End: 2018-03-08 | Stop reason: SDUPTHER

## 2018-03-08 RX ORDER — PROMETHAZINE HYDROCHLORIDE 25 MG/ML
6.25 INJECTION, SOLUTION INTRAMUSCULAR; INTRAVENOUS
Status: DISCONTINUED | OUTPATIENT
Start: 2018-03-08 | End: 2018-03-08 | Stop reason: HOSPADM

## 2018-03-08 RX ORDER — HYDRALAZINE HYDROCHLORIDE 20 MG/ML
5 INJECTION INTRAMUSCULAR; INTRAVENOUS EVERY 10 MIN PRN
Status: DISCONTINUED | OUTPATIENT
Start: 2018-03-08 | End: 2018-03-08 | Stop reason: HOSPADM

## 2018-03-08 RX ORDER — DEXAMETHASONE SODIUM PHOSPHATE 4 MG/ML
INJECTION, SOLUTION INTRA-ARTICULAR; INTRALESIONAL; INTRAMUSCULAR; INTRAVENOUS; SOFT TISSUE PRN
Status: DISCONTINUED | OUTPATIENT
Start: 2018-03-08 | End: 2018-03-08 | Stop reason: SDUPTHER

## 2018-03-08 RX ORDER — NEOSTIGMINE METHYLSULFATE 1 MG/ML
INJECTION, SOLUTION INTRAVENOUS PRN
Status: DISCONTINUED | OUTPATIENT
Start: 2018-03-08 | End: 2018-03-08 | Stop reason: SDUPTHER

## 2018-03-08 RX ORDER — MORPHINE SULFATE 2 MG/ML
2 INJECTION, SOLUTION INTRAMUSCULAR; INTRAVENOUS EVERY 5 MIN PRN
Status: DISCONTINUED | OUTPATIENT
Start: 2018-03-08 | End: 2018-03-08 | Stop reason: HOSPADM

## 2018-03-08 RX ORDER — HYDROCODONE BITARTRATE AND ACETAMINOPHEN 5; 325 MG/1; MG/1
2 TABLET ORAL PRN
Status: DISCONTINUED | OUTPATIENT
Start: 2018-03-08 | End: 2018-03-08 | Stop reason: HOSPADM

## 2018-03-08 RX ORDER — PROPOFOL 10 MG/ML
INJECTION, EMULSION INTRAVENOUS PRN
Status: DISCONTINUED | OUTPATIENT
Start: 2018-03-08 | End: 2018-03-08

## 2018-03-08 RX ORDER — ONDANSETRON 2 MG/ML
INJECTION INTRAMUSCULAR; INTRAVENOUS PRN
Status: DISCONTINUED | OUTPATIENT
Start: 2018-03-08 | End: 2018-03-08 | Stop reason: SDUPTHER

## 2018-03-08 RX ORDER — DIPHENHYDRAMINE HYDROCHLORIDE 50 MG/ML
12.5 INJECTION INTRAMUSCULAR; INTRAVENOUS
Status: DISCONTINUED | OUTPATIENT
Start: 2018-03-08 | End: 2018-03-08 | Stop reason: HOSPADM

## 2018-03-08 RX ORDER — GLYCOPYRROLATE 0.2 MG/ML
INJECTION INTRAMUSCULAR; INTRAVENOUS PRN
Status: DISCONTINUED | OUTPATIENT
Start: 2018-03-08 | End: 2018-03-08 | Stop reason: SDUPTHER

## 2018-03-08 RX ORDER — HYDROCODONE BITARTRATE AND ACETAMINOPHEN 5; 325 MG/1; MG/1
1 TABLET ORAL PRN
Status: DISCONTINUED | OUTPATIENT
Start: 2018-03-08 | End: 2018-03-08 | Stop reason: HOSPADM

## 2018-03-08 RX ORDER — SUCCINYLCHOLINE CHLORIDE 20 MG/ML
INJECTION INTRAMUSCULAR; INTRAVENOUS PRN
Status: DISCONTINUED | OUTPATIENT
Start: 2018-03-08 | End: 2018-03-08 | Stop reason: SDUPTHER

## 2018-03-08 RX ORDER — PROPOFOL 10 MG/ML
INJECTION, EMULSION INTRAVENOUS PRN
Status: DISCONTINUED | OUTPATIENT
Start: 2018-03-08 | End: 2018-03-08 | Stop reason: SDUPTHER

## 2018-03-08 RX ORDER — MEPERIDINE HYDROCHLORIDE 50 MG/ML
12.5 INJECTION INTRAMUSCULAR; INTRAVENOUS; SUBCUTANEOUS EVERY 5 MIN PRN
Status: DISCONTINUED | OUTPATIENT
Start: 2018-03-08 | End: 2018-03-08 | Stop reason: HOSPADM

## 2018-03-08 RX ADMIN — GLYCOPYRROLATE 0.4 MG: 0.2 INJECTION, SOLUTION INTRAMUSCULAR; INTRAVENOUS at 12:50

## 2018-03-08 RX ADMIN — NEOSTIGMINE METHYLSULFATE 3 MG: 1 INJECTION, SOLUTION INTRAVENOUS at 12:50

## 2018-03-08 RX ADMIN — PROPOFOL 200 MG: 10 INJECTION, EMULSION INTRAVENOUS at 11:48

## 2018-03-08 RX ADMIN — LIDOCAINE HYDROCHLORIDE 50 MG: 10 INJECTION, SOLUTION EPIDURAL; INFILTRATION; INTRACAUDAL; PERINEURAL at 11:48

## 2018-03-08 RX ADMIN — ROCURONIUM BROMIDE 10 MG: 10 INJECTION INTRAVENOUS at 11:54

## 2018-03-08 RX ADMIN — DEXAMETHASONE SODIUM PHOSPHATE 4 MG: 4 INJECTION, SOLUTION INTRAMUSCULAR; INTRAVENOUS at 12:04

## 2018-03-08 RX ADMIN — FENTANYL CITRATE 100 MCG: 50 INJECTION, SOLUTION INTRAMUSCULAR; INTRAVENOUS at 11:48

## 2018-03-08 RX ADMIN — SUCCINYLCHOLINE CHLORIDE 160 MG: 20 INJECTION, SOLUTION INTRAMUSCULAR; INTRAVENOUS at 11:48

## 2018-03-08 RX ADMIN — ONDANSETRON 4 MG: 2 INJECTION INTRAMUSCULAR; INTRAVENOUS at 12:35

## 2018-03-08 RX ADMIN — SODIUM CHLORIDE, POTASSIUM CHLORIDE, SODIUM LACTATE AND CALCIUM CHLORIDE: 600; 310; 30; 20 INJECTION, SOLUTION INTRAVENOUS at 11:39

## 2018-03-08 RX ADMIN — FENTANYL CITRATE 50 MCG: 50 INJECTION, SOLUTION INTRAMUSCULAR; INTRAVENOUS at 12:30

## 2018-03-08 RX ADMIN — ROCURONIUM BROMIDE 10 MG: 10 INJECTION INTRAVENOUS at 11:48

## 2018-03-08 RX ADMIN — MIDAZOLAM 2 MG: 1 INJECTION INTRAMUSCULAR; INTRAVENOUS at 11:45

## 2018-03-08 RX ADMIN — FENTANYL CITRATE 50 MCG: 50 INJECTION, SOLUTION INTRAMUSCULAR; INTRAVENOUS at 12:17

## 2018-03-08 ASSESSMENT — PULMONARY FUNCTION TESTS
PIF_VALUE: 2
PIF_VALUE: 2
PIF_VALUE: 19
PIF_VALUE: 12
PIF_VALUE: 2
PIF_VALUE: 19
PIF_VALUE: 12
PIF_VALUE: 21
PIF_VALUE: 19
PIF_VALUE: 3
PIF_VALUE: 0
PIF_VALUE: 20
PIF_VALUE: 21
PIF_VALUE: 18
PIF_VALUE: 21
PIF_VALUE: 15
PIF_VALUE: 12
PIF_VALUE: 15
PIF_VALUE: 20
PIF_VALUE: 19
PIF_VALUE: 12
PIF_VALUE: 2
PIF_VALUE: 19
PIF_VALUE: 20
PIF_VALUE: 19
PIF_VALUE: 14
PIF_VALUE: 20
PIF_VALUE: 2
PIF_VALUE: 17
PIF_VALUE: 18
PIF_VALUE: 14
PIF_VALUE: 20
PIF_VALUE: 19
PIF_VALUE: 14
PIF_VALUE: 14
PIF_VALUE: 12
PIF_VALUE: 14
PIF_VALUE: 18
PIF_VALUE: 19
PIF_VALUE: 20
PIF_VALUE: 19
PIF_VALUE: 14
PIF_VALUE: 12
PIF_VALUE: 15
PIF_VALUE: 18
PIF_VALUE: 22
PIF_VALUE: 19
PIF_VALUE: 16
PIF_VALUE: 20
PIF_VALUE: 20
PIF_VALUE: 2
PIF_VALUE: 2
PIF_VALUE: 20
PIF_VALUE: 1
PIF_VALUE: 14
PIF_VALUE: 14
PIF_VALUE: 3
PIF_VALUE: 2
PIF_VALUE: 2
PIF_VALUE: 1
PIF_VALUE: 21
PIF_VALUE: 19
PIF_VALUE: 2
PIF_VALUE: 23
PIF_VALUE: 1

## 2018-03-08 ASSESSMENT — PAIN - FUNCTIONAL ASSESSMENT: PAIN_FUNCTIONAL_ASSESSMENT: 0-10

## 2018-03-08 ASSESSMENT — PAIN DESCRIPTION - LOCATION
LOCATION: ABDOMEN
LOCATION: ABDOMEN

## 2018-03-08 ASSESSMENT — PAIN SCALES - GENERAL
PAINLEVEL_OUTOF10: 2
PAINLEVEL_OUTOF10: 0
PAINLEVEL_OUTOF10: 2
PAINLEVEL_OUTOF10: 4
PAINLEVEL_OUTOF10: 4

## 2018-03-08 ASSESSMENT — PAIN DESCRIPTION - DESCRIPTORS
DESCRIPTORS: CRAMPING
DESCRIPTORS: ACHING
DESCRIPTORS: PRESSURE

## 2018-03-08 ASSESSMENT — PAIN DESCRIPTION - PAIN TYPE
TYPE: SURGICAL PAIN
TYPE: SURGICAL PAIN

## 2018-03-08 NOTE — ANESTHESIA PRE PROCEDURE
Medications   Medication Dose Route Frequency Provider Last Rate Last Dose    lactated ringers infusion   Intravenous Continuous Eduardo Ray MD           Allergies:     Allergies   Allergen Reactions    Penicillins        Problem List:    Patient Active Problem List   Diagnosis Code    Anemia D64.9    Arthritis M19.90     chronic Alcohol abuse F10.10    Hepatitis C, chronic B18.2    Hepatitis C virus infection without hepatic coma B19.20    Serum ammonia increased (HCC) E72.20    Major depression ([address] C92.7    Alcoholic cirrhosis ([address] N26.62    Cirrhosis of liver ([address] K74.60    Hepatitis C B19.20    Constipation K59.00    Bipolar disorder ([address] F31.9    Alcohol dependence ([address] A62.83    Alcoholic cirrhosis of liver without ascites ([address] K70.30    S/P TIPS (transjugular intrahepatic portosystemic shunt) Z95.828    Chronic pancreatitis ([address] K86.1       Past Medical History:        Diagnosis Date    Alcohol abuse, unspecified     Alcoholic cirrhosis of liver (HCC)     Anxiety     Asthma     Bipolar disorder ([address]     Cirrhosis of liver ([address]     Constipation     Depression     GERD (gastroesophageal reflux disease)     Hepatic encephalopathy (HCC)     Hepatitis C     Pancreatitis, alcoholic     Pulmonary hypertension     Seizures ([address]     5 in the past related to alcohol withdrawal       Past Surgical History:        Procedure Laterality Date     SECTION      CHOLECYSTECTOMY      COLONOSCOPY  14    NORMAL    HYSTERECTOMY      KNEE ARTHROSCOPY Left 2016    Arthroscopic Chondroplasty, Lateral meniscectomy    UPPER GASTROINTESTINAL ENDOSCOPY  14    SCHATZKI RING, MILD CHRONIC GASTRITIS    UPPER GASTROINTESTINAL ENDOSCOPY  2015    MILD CHRONIC GASTRITIS       Social History:    Social History   Substance Use Topics    Smoking status: Current Every Day Smoker     Packs/day: 0.25     Years: 33.00     Types: Cigarettes    Smokeless tobacco: Never

## 2018-03-08 NOTE — H&P
infection without hepatic coma 09/27/2013    Major depression ([facility] 09/27/2013    Arthritis 09/05/2013    Anemia 07/19/2012           OLYA Penn on 3/8/2018 at 10:45 AM

## 2018-03-09 LAB
DIRECT EXAM: NORMAL
Lab: NORMAL
SPECIMEN DESCRIPTION: NORMAL
SPECIMEN DESCRIPTION: NORMAL
STATUS: NORMAL
SURGICAL PATHOLOGY REPORT: NORMAL

## 2018-05-03 ENCOUNTER — OFFICE VISIT (OUTPATIENT)
Dept: FAMILY MEDICINE CLINIC | Age: 58
End: 2018-05-03
Payer: MEDICAID

## 2018-05-03 VITALS
SYSTOLIC BLOOD PRESSURE: 100 MMHG | RESPIRATION RATE: 20 BRPM | HEIGHT: 68 IN | HEART RATE: 80 BPM | DIASTOLIC BLOOD PRESSURE: 58 MMHG | TEMPERATURE: 96.3 F | BODY MASS INDEX: 26.67 KG/M2 | WEIGHT: 176 LBS

## 2018-05-03 DIAGNOSIS — K70.31 ASCITES DUE TO ALCOHOLIC CIRRHOSIS (HCC): ICD-10-CM

## 2018-05-03 DIAGNOSIS — J01.90 ACUTE BACTERIAL SINUSITIS: ICD-10-CM

## 2018-05-03 DIAGNOSIS — K70.30 ALCOHOLIC CIRRHOSIS OF LIVER WITHOUT ASCITES (HCC): Primary | ICD-10-CM

## 2018-05-03 DIAGNOSIS — B96.89 ACUTE BACTERIAL SINUSITIS: ICD-10-CM

## 2018-05-03 PROCEDURE — 99213 OFFICE O/P EST LOW 20 MIN: CPT | Performed by: NURSE PRACTITIONER

## 2018-05-03 PROCEDURE — G8427 DOCREV CUR MEDS BY ELIG CLIN: HCPCS | Performed by: NURSE PRACTITIONER

## 2018-05-03 PROCEDURE — 4004F PT TOBACCO SCREEN RCVD TLK: CPT | Performed by: NURSE PRACTITIONER

## 2018-05-03 PROCEDURE — 3017F COLORECTAL CA SCREEN DOC REV: CPT | Performed by: NURSE PRACTITIONER

## 2018-05-03 PROCEDURE — G8419 CALC BMI OUT NRM PARAM NOF/U: HCPCS | Performed by: NURSE PRACTITIONER

## 2018-05-03 RX ORDER — AZITHROMYCIN 250 MG/1
TABLET, FILM COATED ORAL
Qty: 1 PACKET | Refills: 0 | Status: SHIPPED | OUTPATIENT
Start: 2018-05-03 | End: 2018-05-07

## 2018-05-03 RX ORDER — ALBUTEROL SULFATE 90 UG/1
2 AEROSOL, METERED RESPIRATORY (INHALATION) EVERY 6 HOURS PRN
Qty: 1 INHALER | Refills: 0 | Status: ON HOLD | OUTPATIENT
Start: 2018-05-03 | End: 2019-06-26 | Stop reason: ALTCHOICE

## 2018-05-08 ENCOUNTER — TELEPHONE (OUTPATIENT)
Dept: FAMILY MEDICINE CLINIC | Age: 58
End: 2018-05-08

## 2018-05-10 RX ORDER — LACTULOSE 10 G/15ML
20 SOLUTION ORAL 4 TIMES DAILY
Qty: 3600 ML | Refills: 3 | Status: SHIPPED | OUTPATIENT
Start: 2018-05-10 | End: 2019-04-19 | Stop reason: SDUPTHER

## 2018-05-11 PROBLEM — R93.1 ABNORMAL ECHOCARDIOGRAM: Status: ACTIVE | Noted: 2018-05-11

## 2018-05-11 PROBLEM — I27.20 PULMONARY HTN (HCC): Status: ACTIVE | Noted: 2018-05-11

## 2018-05-11 PROBLEM — J44.9 CHRONIC OBSTRUCTIVE PULMONARY DISEASE (HCC): Status: ACTIVE | Noted: 2018-05-11

## 2018-05-11 PROBLEM — I48.0 PAF (PAROXYSMAL ATRIAL FIBRILLATION) (HCC): Status: ACTIVE | Noted: 2018-05-11

## 2018-05-13 ASSESSMENT — ENCOUNTER SYMPTOMS
SHORTNESS OF BREATH: 1
ABDOMINAL DISTENTION: 1
DIARRHEA: 0
SINUS PRESSURE: 1
CHEST TIGHTNESS: 1
NAUSEA: 0
COUGH: 1

## 2018-05-14 ENCOUNTER — TELEPHONE (OUTPATIENT)
Dept: FAMILY MEDICINE CLINIC | Age: 58
End: 2018-05-14

## 2018-05-16 ENCOUNTER — OFFICE VISIT (OUTPATIENT)
Dept: FAMILY MEDICINE CLINIC | Age: 58
End: 2018-05-16
Payer: MEDICAID

## 2018-05-16 ENCOUNTER — HOSPITAL ENCOUNTER (OUTPATIENT)
Age: 58
Setting detail: SPECIMEN
Discharge: HOME | End: 2018-05-16
Payer: MEDICAID

## 2018-05-16 VITALS
BODY MASS INDEX: 26.37 KG/M2 | WEIGHT: 174 LBS | TEMPERATURE: 96.2 F | SYSTOLIC BLOOD PRESSURE: 110 MMHG | HEIGHT: 68 IN | HEART RATE: 84 BPM | DIASTOLIC BLOOD PRESSURE: 68 MMHG | RESPIRATION RATE: 20 BRPM

## 2018-05-16 DIAGNOSIS — J44.1 COPD EXACERBATION (HCC): ICD-10-CM

## 2018-05-16 DIAGNOSIS — K70.31 ALCOHOLIC CIRRHOSIS OF LIVER WITH ASCITES (HCC): ICD-10-CM

## 2018-05-16 DIAGNOSIS — E87.6 HYPOKALEMIA: ICD-10-CM

## 2018-05-16 DIAGNOSIS — E66.3 OVERWEIGHT (BMI 25.0-29.9): ICD-10-CM

## 2018-05-16 DIAGNOSIS — F17.200 SMOKER: ICD-10-CM

## 2018-05-16 DIAGNOSIS — I48.0 PAF (PAROXYSMAL ATRIAL FIBRILLATION) (HCC): Primary | ICD-10-CM

## 2018-05-16 LAB
ANION GAP SERPL CALCULATED.3IONS-SCNC: 14 MMOL/L (ref 9–17)
BUN BLDV-MCNC: 12 MG/DL (ref 6–20)
BUN/CREAT BLD: NORMAL (ref 9–20)
CALCIUM SERPL-MCNC: 8.9 MG/DL (ref 8.6–10.4)
CHLORIDE BLD-SCNC: 102 MMOL/L (ref 98–107)
CO2: 20 MMOL/L (ref 20–31)
CREAT SERPL-MCNC: 0.72 MG/DL (ref 0.5–0.9)
GFR AFRICAN AMERICAN: >60 ML/MIN
GFR NON-AFRICAN AMERICAN: >60 ML/MIN
GFR SERPL CREATININE-BSD FRML MDRD: NORMAL ML/MIN/{1.73_M2}
GFR SERPL CREATININE-BSD FRML MDRD: NORMAL ML/MIN/{1.73_M2}
GLUCOSE BLD-MCNC: 83 MG/DL (ref 70–99)
POTASSIUM SERPL-SCNC: 4.2 MMOL/L (ref 3.7–5.3)
SODIUM BLD-SCNC: 136 MMOL/L (ref 135–144)

## 2018-05-16 PROCEDURE — 99213 OFFICE O/P EST LOW 20 MIN: CPT | Performed by: NURSE PRACTITIONER

## 2018-05-16 PROCEDURE — 1111F DSCHRG MED/CURRENT MED MERGE: CPT | Performed by: NURSE PRACTITIONER

## 2018-05-16 RX ORDER — NICOTINE 21 MG/24HR
1 PATCH, TRANSDERMAL 24 HOURS TRANSDERMAL EVERY 24 HOURS
Qty: 30 PATCH | Refills: 0 | Status: SHIPPED | OUTPATIENT
Start: 2018-05-16 | End: 2018-06-22

## 2018-05-17 DIAGNOSIS — Z12.39 BREAST CANCER SCREENING: Primary | ICD-10-CM

## 2018-05-21 ENCOUNTER — HOSPITAL ENCOUNTER (OUTPATIENT)
Dept: ULTRASOUND IMAGING | Facility: CLINIC | Age: 58
Discharge: HOME | End: 2018-05-23
Payer: MEDICAID

## 2018-05-21 DIAGNOSIS — K70.30 ALCOHOLIC CIRRHOSIS OF LIVER WITHOUT ASCITES (HCC): ICD-10-CM

## 2018-05-21 DIAGNOSIS — K70.31 ASCITES DUE TO ALCOHOLIC CIRRHOSIS (HCC): ICD-10-CM

## 2018-05-21 PROCEDURE — 76705 ECHO EXAM OF ABDOMEN: CPT

## 2018-05-24 DIAGNOSIS — Z12.39 BREAST CANCER SCREENING: ICD-10-CM

## 2018-05-29 ENCOUNTER — OFFICE VISIT (OUTPATIENT)
Dept: GASTROENTEROLOGY | Age: 58
End: 2018-05-29
Payer: MEDICAID

## 2018-05-29 VITALS
RESPIRATION RATE: 14 BRPM | BODY MASS INDEX: 26.22 KG/M2 | SYSTOLIC BLOOD PRESSURE: 99 MMHG | OXYGEN SATURATION: 94 % | WEIGHT: 173 LBS | HEIGHT: 68 IN | DIASTOLIC BLOOD PRESSURE: 67 MMHG | HEART RATE: 80 BPM

## 2018-05-29 DIAGNOSIS — B18.2 CHRONIC HEPATITIS C WITHOUT HEPATIC COMA (HCC): ICD-10-CM

## 2018-05-29 DIAGNOSIS — K59.09 OTHER CONSTIPATION: ICD-10-CM

## 2018-05-29 DIAGNOSIS — Z95.828 S/P TIPS (TRANSJUGULAR INTRAHEPATIC PORTOSYSTEMIC SHUNT): ICD-10-CM

## 2018-05-29 DIAGNOSIS — K70.30 ALCOHOLIC CIRRHOSIS OF LIVER WITHOUT ASCITES (HCC): Primary | ICD-10-CM

## 2018-05-29 DIAGNOSIS — K86.1 CHRONIC PANCREATITIS, UNSPECIFIED PANCREATITIS TYPE (HCC): ICD-10-CM

## 2018-05-29 PROCEDURE — 99214 OFFICE O/P EST MOD 30 MIN: CPT | Performed by: INTERNAL MEDICINE

## 2018-05-29 PROCEDURE — G8417 CALC BMI ABV UP PARAM F/U: HCPCS | Performed by: INTERNAL MEDICINE

## 2018-05-29 PROCEDURE — 3017F COLORECTAL CA SCREEN DOC REV: CPT | Performed by: INTERNAL MEDICINE

## 2018-05-29 PROCEDURE — G8427 DOCREV CUR MEDS BY ELIG CLIN: HCPCS | Performed by: INTERNAL MEDICINE

## 2018-05-29 PROCEDURE — 4004F PT TOBACCO SCREEN RCVD TLK: CPT | Performed by: INTERNAL MEDICINE

## 2018-05-29 ASSESSMENT — ENCOUNTER SYMPTOMS
RECTAL PAIN: 0
EYES NEGATIVE: 1
ABDOMINAL DISTENTION: 1
NAUSEA: 0
COUGH: 0
BACK PAIN: 0
ALLERGIC/IMMUNOLOGIC NEGATIVE: 1
CONSTIPATION: 0
WHEEZING: 0
ANAL BLEEDING: 0
DIARRHEA: 0
RESPIRATORY NEGATIVE: 1
ABDOMINAL PAIN: 0
SINUS PRESSURE: 0
SHORTNESS OF BREATH: 0
TROUBLE SWALLOWING: 0
BLOOD IN STOOL: 0
VOMITING: 0

## 2018-06-22 DIAGNOSIS — F17.200 TOBACCO DEPENDENCE: ICD-10-CM

## 2018-06-22 RX ORDER — VARENICLINE TARTRATE 25 MG
KIT ORAL
Qty: 42 TABLET | Refills: 0 | Status: CANCELLED | OUTPATIENT
Start: 2018-06-22

## 2018-06-22 RX ORDER — VARENICLINE TARTRATE 25 MG
KIT ORAL
Qty: 42 TABLET | Refills: 0 | Status: SHIPPED | OUTPATIENT
Start: 2018-06-22 | End: 2018-07-19 | Stop reason: SINTOL

## 2018-07-19 ENCOUNTER — OFFICE VISIT (OUTPATIENT)
Dept: FAMILY MEDICINE CLINIC | Age: 58
End: 2018-07-19
Payer: MEDICAID

## 2018-07-19 VITALS
HEART RATE: 80 BPM | RESPIRATION RATE: 16 BRPM | DIASTOLIC BLOOD PRESSURE: 80 MMHG | TEMPERATURE: 97.2 F | WEIGHT: 168.6 LBS | HEIGHT: 68 IN | SYSTOLIC BLOOD PRESSURE: 110 MMHG | BODY MASS INDEX: 25.55 KG/M2

## 2018-07-19 DIAGNOSIS — B07.8 OTHER VIRAL WARTS: ICD-10-CM

## 2018-07-19 DIAGNOSIS — Z71.6 ENCOUNTER FOR SMOKING CESSATION COUNSELING: ICD-10-CM

## 2018-07-19 DIAGNOSIS — M25.562 ACUTE PAIN OF LEFT KNEE: Primary | ICD-10-CM

## 2018-07-19 PROCEDURE — G8427 DOCREV CUR MEDS BY ELIG CLIN: HCPCS | Performed by: NURSE PRACTITIONER

## 2018-07-19 PROCEDURE — 4004F PT TOBACCO SCREEN RCVD TLK: CPT | Performed by: NURSE PRACTITIONER

## 2018-07-19 PROCEDURE — 99213 OFFICE O/P EST LOW 20 MIN: CPT | Performed by: NURSE PRACTITIONER

## 2018-07-19 PROCEDURE — G8417 CALC BMI ABV UP PARAM F/U: HCPCS | Performed by: NURSE PRACTITIONER

## 2018-07-19 PROCEDURE — 17110 DESTRUCTION B9 LES UP TO 14: CPT | Performed by: NURSE PRACTITIONER

## 2018-07-19 PROCEDURE — 3017F COLORECTAL CA SCREEN DOC REV: CPT | Performed by: NURSE PRACTITIONER

## 2018-07-19 RX ORDER — MULTIVIT-MIN/IRON/FOLIC ACID/K 18-600-40
1 CAPSULE ORAL DAILY
Status: ON HOLD | COMMUNITY
End: 2022-01-19

## 2018-07-19 RX ORDER — TRAMADOL HYDROCHLORIDE 50 MG/1
50 TABLET ORAL EVERY 6 HOURS PRN
Qty: 12 TABLET | Refills: 0 | Status: SHIPPED | OUTPATIENT
Start: 2018-07-19 | End: 2018-07-22

## 2018-07-19 RX ORDER — LACTULOSE 10 G/15ML
20 SOLUTION ORAL DAILY
Status: ON HOLD | COMMUNITY
End: 2019-06-26 | Stop reason: DRUGHIGH

## 2018-07-19 RX ORDER — BUPROPION HYDROCHLORIDE 150 MG/1
150 TABLET, EXTENDED RELEASE ORAL 2 TIMES DAILY
Qty: 60 TABLET | Refills: 3 | Status: SHIPPED | OUTPATIENT
Start: 2018-07-19 | End: 2020-02-11 | Stop reason: ALTCHOICE

## 2018-07-19 ASSESSMENT — PATIENT HEALTH QUESTIONNAIRE - PHQ9
SUM OF ALL RESPONSES TO PHQ QUESTIONS 1-9: 0
2. FEELING DOWN, DEPRESSED OR HOPELESS: 0
SUM OF ALL RESPONSES TO PHQ9 QUESTIONS 1 & 2: 0
1. LITTLE INTEREST OR PLEASURE IN DOING THINGS: 0

## 2018-07-19 NOTE — PROGRESS NOTES
Depression Severity: 1-4 = Minimal depression, 5-9 = Mild depression, 10-14 = Moderate depression, 15-19 = Moderately severe depression, 20-27 = Severe depression    Current Outpatient Prescriptions   Medication Sig Dispense Refill    Cholecalciferol (VITAMIN D) 2000 units CAPS capsule Take 1 capsule by mouth daily      lactulose (CHRONULAC) 10 GM/15ML solution Take 20 g by mouth daily      buPROPion (WELLBUTRIN SR) 150 MG extended release tablet Take 1 tablet by mouth 2 times daily 60 tablet 3    CARTIA  MG extended release capsule Take 120 mg by mouth daily  0    albuterol sulfate HFA (PROAIR HFA) 108 (90 Base) MCG/ACT inhaler Inhale 2 puffs into the lungs every 6 hours as needed for Wheezing 1 Inhaler 0    traZODone (DESYREL) 100 MG tablet take 1 tablet by mouth at bedtime prn  0    ibuprofen (ADVIL;MOTRIN) 800 MG tablet Take 1 tablet by mouth every 8 hours as needed for Pain 90 tablet 0    alendronate (FOSAMAX) 70 MG tablet Take 1 tablet by mouth every 7 days 4 tablet 3    oxybutynin (DITROPAN XL) 10 MG CR tablet Take 1 tablet by mouth daily 30 tablet 5    calcium carbonate 600 MG TABS tablet Take 1 tablet by mouth daily      vitamin B-12 (CYANOCOBALAMIN) 1000 MCG tablet Take 1,000 mcg by mouth daily      furosemide (LASIX) 40 MG tablet Take 1 tablet by mouth daily 60 tablet 3    spironolactone (ALDACTONE) 100 MG tablet Take 1 tablet by mouth daily 30 tablet 3    magnesium oxide (MAG-OX) 400 MG tablet Take 400 mg by mouth daily 2 tabs twice a day       QUEtiapine (SEROQUEL) 300 MG tablet Take 1 tablet by mouth nightly. 30 tablet 0     No current facility-administered medications for this visit. STAN Harris presents to the office today for smoking cessation, left knee pain, and a wart she would like frozen off. Has tried chantix from smoking cessation however it gave her bad dreams. She would like to try Wellbutrin. Has a wart on her. Continues to get bigger, tender.  Has not tried

## 2018-07-23 PROBLEM — I51.9 RIGHT VENTRICULAR DYSFUNCTION: Status: ACTIVE | Noted: 2018-07-23

## 2018-07-23 PROBLEM — I51.7 RIGHT VENTRICULAR ENLARGEMENT: Status: ACTIVE | Noted: 2018-07-23

## 2018-07-29 ASSESSMENT — ENCOUNTER SYMPTOMS: ROS SKIN COMMENTS: WART ON HAND

## 2018-10-18 ENCOUNTER — TELEPHONE (OUTPATIENT)
Dept: FAMILY MEDICINE CLINIC | Age: 58
End: 2018-10-18

## 2018-11-12 DIAGNOSIS — Z72.0 TOBACCO ABUSE: Primary | ICD-10-CM

## 2018-11-12 RX ORDER — NICOTINE 21 MG/24HR
1 PATCH, TRANSDERMAL 24 HOURS TRANSDERMAL EVERY 24 HOURS
Qty: 30 PATCH | Refills: 0 | Status: SHIPPED | OUTPATIENT
Start: 2018-11-12 | End: 2018-12-14 | Stop reason: SDUPTHER

## 2018-11-21 ENCOUNTER — OFFICE VISIT (OUTPATIENT)
Dept: FAMILY MEDICINE CLINIC | Age: 58
End: 2018-11-21
Payer: MEDICAID

## 2018-11-21 VITALS
WEIGHT: 174.3 LBS | TEMPERATURE: 96.4 F | BODY MASS INDEX: 26.5 KG/M2 | SYSTOLIC BLOOD PRESSURE: 100 MMHG | DIASTOLIC BLOOD PRESSURE: 60 MMHG | RESPIRATION RATE: 16 BRPM | HEART RATE: 88 BPM

## 2018-11-21 DIAGNOSIS — I27.20 PULMONARY HTN (HCC): ICD-10-CM

## 2018-11-21 DIAGNOSIS — G89.29 CHRONIC LEFT-SIDED LOW BACK PAIN WITHOUT SCIATICA: Primary | ICD-10-CM

## 2018-11-21 DIAGNOSIS — F31.62 BIPOLAR DISORDER, CURRENT EPISODE MIXED, MODERATE (HCC): ICD-10-CM

## 2018-11-21 DIAGNOSIS — E72.20 SERUM AMMONIA INCREASED (HCC): ICD-10-CM

## 2018-11-21 DIAGNOSIS — F17.210 CIGARETTE NICOTINE DEPENDENCE WITHOUT COMPLICATION: ICD-10-CM

## 2018-11-21 DIAGNOSIS — M54.50 CHRONIC LEFT-SIDED LOW BACK PAIN WITHOUT SCIATICA: Primary | ICD-10-CM

## 2018-11-21 PROCEDURE — 4004F PT TOBACCO SCREEN RCVD TLK: CPT | Performed by: NURSE PRACTITIONER

## 2018-11-21 PROCEDURE — 96372 THER/PROPH/DIAG INJ SC/IM: CPT | Performed by: NURSE PRACTITIONER

## 2018-11-21 PROCEDURE — G8484 FLU IMMUNIZE NO ADMIN: HCPCS | Performed by: NURSE PRACTITIONER

## 2018-11-21 PROCEDURE — 99214 OFFICE O/P EST MOD 30 MIN: CPT | Performed by: NURSE PRACTITIONER

## 2018-11-21 PROCEDURE — 3017F COLORECTAL CA SCREEN DOC REV: CPT | Performed by: NURSE PRACTITIONER

## 2018-11-21 PROCEDURE — G8417 CALC BMI ABV UP PARAM F/U: HCPCS | Performed by: NURSE PRACTITIONER

## 2018-11-21 PROCEDURE — G8427 DOCREV CUR MEDS BY ELIG CLIN: HCPCS | Performed by: NURSE PRACTITIONER

## 2018-11-21 PROCEDURE — 99406 BEHAV CHNG SMOKING 3-10 MIN: CPT | Performed by: NURSE PRACTITIONER

## 2018-11-21 RX ORDER — NAPROXEN 500 MG/1
500 TABLET ORAL 2 TIMES DAILY WITH MEALS
Qty: 30 TABLET | Refills: 0 | Status: ON HOLD | OUTPATIENT
Start: 2018-11-21 | End: 2022-01-21 | Stop reason: HOSPADM

## 2018-11-21 RX ORDER — CYCLOBENZAPRINE HYDROCHLORIDE 7.5 MG/1
7.5 TABLET, FILM COATED ORAL 3 TIMES DAILY PRN
Qty: 30 TABLET | Refills: 0 | Status: SHIPPED | OUTPATIENT
Start: 2018-11-21 | End: 2018-11-27 | Stop reason: DRUGHIGH

## 2018-11-21 RX ORDER — LIDOCAINE 50 MG/G
1 PATCH TOPICAL DAILY
Qty: 30 PATCH | Refills: 0 | Status: ON HOLD | OUTPATIENT
Start: 2018-11-21 | End: 2019-06-26 | Stop reason: ALTCHOICE

## 2018-11-21 RX ORDER — KETOROLAC TROMETHAMINE 30 MG/ML
60 INJECTION, SOLUTION INTRAMUSCULAR; INTRAVENOUS ONCE
Status: COMPLETED | OUTPATIENT
Start: 2018-11-21 | End: 2018-11-21

## 2018-11-21 RX ORDER — METHYLPREDNISOLONE ACETATE 80 MG/ML
80 INJECTION, SUSPENSION INTRA-ARTICULAR; INTRALESIONAL; INTRAMUSCULAR; SOFT TISSUE ONCE
Status: COMPLETED | OUTPATIENT
Start: 2018-11-21 | End: 2018-11-21

## 2018-11-21 RX ADMIN — METHYLPREDNISOLONE ACETATE 80 MG: 80 INJECTION, SUSPENSION INTRA-ARTICULAR; INTRALESIONAL; INTRAMUSCULAR; SOFT TISSUE at 12:00

## 2018-11-21 RX ADMIN — KETOROLAC TROMETHAMINE 60 MG: 30 INJECTION, SOLUTION INTRAMUSCULAR; INTRAVENOUS at 11:58

## 2018-11-21 ASSESSMENT — ENCOUNTER SYMPTOMS
RHINORRHEA: 0
EYE DISCHARGE: 0
SORE THROAT: 0
BACK PAIN: 1
NAUSEA: 0
BOWEL INCONTINENCE: 0
DIARRHEA: 0
SHORTNESS OF BREATH: 0
EYE ITCHING: 0
ABDOMINAL PAIN: 0
EYE REDNESS: 0
CONSTIPATION: 0
COUGH: 0

## 2018-11-21 NOTE — PROGRESS NOTES
nicotine (NICODERM CQ) 14 MG/24HR Place 1 patch onto the skin every 24 hours 30 patch 0    Cholecalciferol (VITAMIN D) 2000 units CAPS capsule Take 1 capsule by mouth daily      lactulose (CHRONULAC) 10 GM/15ML solution Take 20 g by mouth daily      buPROPion (WELLBUTRIN SR) 150 MG extended release tablet Take 1 tablet by mouth 2 times daily 60 tablet 3    CARTIA  MG extended release capsule Take 120 mg by mouth daily  0    albuterol sulfate HFA (PROAIR HFA) 108 (90 Base) MCG/ACT inhaler Inhale 2 puffs into the lungs every 6 hours as needed for Wheezing 1 Inhaler 0    ibuprofen (ADVIL;MOTRIN) 800 MG tablet Take 1 tablet by mouth every 8 hours as needed for Pain 90 tablet 0    alendronate (FOSAMAX) 70 MG tablet Take 1 tablet by mouth every 7 days 4 tablet 3    oxybutynin (DITROPAN XL) 10 MG CR tablet Take 1 tablet by mouth daily 30 tablet 5    calcium carbonate 600 MG TABS tablet Take 1 tablet by mouth daily      vitamin B-12 (CYANOCOBALAMIN) 1000 MCG tablet Take 1,000 mcg by mouth daily      furosemide (LASIX) 40 MG tablet Take 1 tablet by mouth daily 60 tablet 3    spironolactone (ALDACTONE) 100 MG tablet Take 1 tablet by mouth daily 30 tablet 3    magnesium oxide (MAG-OX) 400 MG tablet Take 400 mg by mouth daily 2 tabs twice a day       QUEtiapine (SEROQUEL) 300 MG tablet Take 1 tablet by mouth nightly. 30 tablet 0    traZODone (DESYREL) 100 MG tablet take 1 tablet by mouth at bedtime prn  0     Current Facility-Administered Medications   Medication Dose Route Frequency Provider Last Rate Last Dose    methylPREDNISolone acetate (DEPO-MEDROL) injection 80 mg  80 mg Intramuscular Once OLYA Lentz - CNP           Patient ID: Rich Luu is a 66 y.o. female. Patient presents in the office today for constant pain/tender on left side low back pain. Onset 1 month. Unknown what caused pain. Patient is having trouble bending over, feels like it is \"pulling/stretching. \" Patient has tried excedrin/heat and Bengay with no improvement. Has had back issues before. Was seeing  at [facility]. Has been a year since seeing this ortho doc. Switched to [facility] in [place] but hasn't seen him in a while either since he retired. Was seeing him for her knees. Similar back issues felt like this and were relieved with Ultram. OxyContin worked the best for her. Pain management was doing steroid injections. Stopped going because it was getting better. Back Pain   This is a new problem. The current episode started 1 to 4 weeks ago. The problem occurs constantly. The problem is unchanged. The pain is present in the lumbar spine. The quality of the pain is described as aching and shooting (sometimes to the middle of back). The pain does not radiate. The pain is at a severity of 8/10. The pain is severe. The pain is worse during the night. The symptoms are aggravated by bending, twisting and sitting. Pertinent negatives include no abdominal pain, bladder incontinence, bowel incontinence, chest pain, dysuria, fever, headaches, numbness, tingling or weakness. Risk factors include sedentary lifestyle, lack of exercise and poor posture. She has tried heat, analgesics and NSAIDs for the symptoms. The treatment provided no relief. Review of Systems   Constitutional: Positive for activity change. Negative for appetite change, fatigue and fever. HENT: Negative for congestion, ear pain, postnasal drip, rhinorrhea and sore throat. Eyes: Negative for discharge, redness and itching. Respiratory: Negative for cough and shortness of breath. Cardiovascular: Negative for chest pain. Gastrointestinal: Negative for abdominal pain, bowel incontinence, constipation, diarrhea and nausea. Genitourinary: Negative for bladder incontinence and dysuria. Musculoskeletal: Positive for arthralgias, back pain and myalgias (left lower back). Skin: Negative for rash.    Neurological: Negative for dizziness,

## 2018-11-27 RX ORDER — CYCLOBENZAPRINE HCL 5 MG
5 TABLET ORAL 3 TIMES DAILY PRN
Qty: 30 TABLET | Refills: 0 | Status: SHIPPED | OUTPATIENT
Start: 2018-11-27 | End: 2018-12-14 | Stop reason: SDUPTHER

## 2018-12-14 DIAGNOSIS — Z72.0 TOBACCO ABUSE: ICD-10-CM

## 2018-12-14 NOTE — TELEPHONE ENCOUNTER
LOV 11/21/18  LRF 11/27/18  RTO Return if symptoms fail to improve    Health Maintenance   Topic Date Due    Shingles Vaccine (1 of 2 - 2 Dose Series) 01/07/2012    Flu vaccine (1) 09/01/2018    HIV screen  01/01/2019 (Originally 1/7/1975)    Cervical cancer screen  08/04/2019    Breast cancer screen  05/23/2020    Lipid screen  02/15/2023    DTaP/Tdap/Td vaccine (2 - Td) 05/10/2023    Colon cancer screen colonoscopy  01/22/2024    Pneumococcal med risk  Completed             (applicable per patient's age: Cancer Screenings, Depression Screening, Fall Risk Screening, Immunizations)    Hemoglobin A1C (%)   Date Value   02/15/2018 4.9   09/22/2015 4.8     LDL Cholesterol (mg/dL)   Date Value   02/15/2018 81     LDL Calculated (mg/dL)   Date Value   05/05/2014 86     AST (U/L)   Date Value   03/08/2018 35 (H)     ALT (U/L)   Date Value   03/08/2018 20     BUN (mg/dL)   Date Value   05/16/2018 12      (goal A1C is < 7)   (goal LDL is <100) need 30-50% reduction from baseline     BP Readings from Last 3 Encounters:   11/21/18 100/60   07/23/18 120/80   07/19/18 110/80    (goal /80)      All Future Testing planned in [facility]:  Lab Frequency Next Occurrence   Liver Fibrosis, Chronic Viral Hepatitis Once 05/03/2018   AFP Tumor Marker Once 08/29/2018   Hepatic Function Panel Once 08/29/2018   XR LUMBAR SPINE (2-3 VIEWS) Once 11/21/2018       Next Visit Date:  No future appointments.          Patient Active Problem List:     Anemia     Arthritis      chronic Alcohol abuse     Hepatitis C, chronic     Hepatitis C virus infection without hepatic coma     Serum ammonia increased (HCC)     Major depression (HCC)     Alcoholic cirrhosis (HCC)     Cirrhosis of liver (HCC)     Hepatitis C     Constipation     Bipolar disorder (HCC)     Alcohol dependence ([address]     Alcoholic cirrhosis of liver without ascites (HCC)     S/P TIPS (transjugular intrahepatic portosystemic shunt)     Chronic pancreatitis ([address]     PAF

## 2018-12-15 RX ORDER — CYCLOBENZAPRINE HCL 5 MG
5 TABLET ORAL 3 TIMES DAILY PRN
Qty: 30 TABLET | Refills: 0 | Status: SHIPPED | OUTPATIENT
Start: 2018-12-15 | End: 2018-12-25

## 2018-12-17 RX ORDER — NICOTINE 21 MG/24HR
1 PATCH, TRANSDERMAL 24 HOURS TRANSDERMAL EVERY 24 HOURS
Qty: 28 PATCH | Refills: 0 | Status: SHIPPED | OUTPATIENT
Start: 2018-12-17 | End: 2019-01-08 | Stop reason: SDUPTHER

## 2019-01-03 ENCOUNTER — HOSPITAL ENCOUNTER (OUTPATIENT)
Age: 59
Setting detail: SPECIMEN
Discharge: HOME | End: 2019-01-03
Payer: MEDICAID

## 2019-01-03 ENCOUNTER — OFFICE VISIT (OUTPATIENT)
Dept: FAMILY MEDICINE CLINIC | Age: 59
End: 2019-01-03
Payer: MEDICAID

## 2019-01-03 VITALS
SYSTOLIC BLOOD PRESSURE: 116 MMHG | RESPIRATION RATE: 14 BRPM | DIASTOLIC BLOOD PRESSURE: 80 MMHG | HEART RATE: 88 BPM | TEMPERATURE: 97.7 F | WEIGHT: 176 LBS | BODY MASS INDEX: 26.76 KG/M2

## 2019-01-03 DIAGNOSIS — H66.002 ACUTE SUPPURATIVE OTITIS MEDIA OF LEFT EAR WITHOUT SPONTANEOUS RUPTURE OF TYMPANIC MEMBRANE, RECURRENCE NOT SPECIFIED: Primary | ICD-10-CM

## 2019-01-03 DIAGNOSIS — Z20.818 STREP THROAT EXPOSURE: ICD-10-CM

## 2019-01-03 DIAGNOSIS — J02.9 SORE THROAT: ICD-10-CM

## 2019-01-03 LAB — S PYO AG THROAT QL: NORMAL

## 2019-01-03 PROCEDURE — 3017F COLORECTAL CA SCREEN DOC REV: CPT | Performed by: NURSE PRACTITIONER

## 2019-01-03 PROCEDURE — G8428 CUR MEDS NOT DOCUMENT: HCPCS | Performed by: NURSE PRACTITIONER

## 2019-01-03 PROCEDURE — G8417 CALC BMI ABV UP PARAM F/U: HCPCS | Performed by: NURSE PRACTITIONER

## 2019-01-03 PROCEDURE — 87880 STREP A ASSAY W/OPTIC: CPT | Performed by: NURSE PRACTITIONER

## 2019-01-03 PROCEDURE — 99213 OFFICE O/P EST LOW 20 MIN: CPT | Performed by: NURSE PRACTITIONER

## 2019-01-03 PROCEDURE — 4004F PT TOBACCO SCREEN RCVD TLK: CPT | Performed by: NURSE PRACTITIONER

## 2019-01-03 PROCEDURE — G8484 FLU IMMUNIZE NO ADMIN: HCPCS | Performed by: NURSE PRACTITIONER

## 2019-01-03 RX ORDER — AZITHROMYCIN 250 MG/1
TABLET, FILM COATED ORAL
Qty: 6 TABLET | Refills: 0 | Status: SHIPPED | OUTPATIENT
Start: 2019-01-03 | End: 2019-01-13

## 2019-01-03 ASSESSMENT — ENCOUNTER SYMPTOMS
SHORTNESS OF BREATH: 1
ABDOMINAL PAIN: 0
NAUSEA: 0
EYE ITCHING: 1
VOMITING: 0
SWOLLEN GLANDS: 1
SINUS PRESSURE: 1
WHEEZING: 1
COUGH: 1
SORE THROAT: 1
RHINORRHEA: 1

## 2019-01-04 LAB
DIRECT EXAM: NORMAL
Lab: NORMAL
SPECIMEN DESCRIPTION: NORMAL
STATUS: NORMAL

## 2019-01-08 DIAGNOSIS — Z72.0 TOBACCO ABUSE: ICD-10-CM

## 2019-01-09 RX ORDER — NICOTINE 21 MG/24HR
1 PATCH, TRANSDERMAL 24 HOURS TRANSDERMAL EVERY 24 HOURS
Qty: 28 PATCH | Refills: 1 | Status: SHIPPED | OUTPATIENT
Start: 2019-01-09 | End: 2019-03-09 | Stop reason: SDUPTHER

## 2019-02-20 ENCOUNTER — OFFICE VISIT (OUTPATIENT)
Dept: GASTROENTEROLOGY | Age: 59
End: 2019-02-20
Payer: MEDICAID

## 2019-02-20 VITALS
HEART RATE: 73 BPM | WEIGHT: 173.8 LBS | DIASTOLIC BLOOD PRESSURE: 68 MMHG | BODY MASS INDEX: 26.43 KG/M2 | SYSTOLIC BLOOD PRESSURE: 109 MMHG

## 2019-02-20 DIAGNOSIS — B18.2 CHRONIC HEPATITIS C WITHOUT HEPATIC COMA (HCC): ICD-10-CM

## 2019-02-20 DIAGNOSIS — K70.30 ALCOHOLIC CIRRHOSIS OF LIVER WITHOUT ASCITES (HCC): ICD-10-CM

## 2019-02-20 DIAGNOSIS — Z87.19 HX OF ESOPHAGEAL VARICES: ICD-10-CM

## 2019-02-20 DIAGNOSIS — K86.1 CHRONIC PANCREATITIS, UNSPECIFIED PANCREATITIS TYPE (HCC): ICD-10-CM

## 2019-02-20 DIAGNOSIS — Z95.828 S/P TIPS (TRANSJUGULAR INTRAHEPATIC PORTOSYSTEMIC SHUNT): ICD-10-CM

## 2019-02-20 DIAGNOSIS — R04.2 HEMOPTYSIS: Primary | ICD-10-CM

## 2019-02-20 PROCEDURE — 3017F COLORECTAL CA SCREEN DOC REV: CPT | Performed by: INTERNAL MEDICINE

## 2019-02-20 PROCEDURE — 4004F PT TOBACCO SCREEN RCVD TLK: CPT | Performed by: INTERNAL MEDICINE

## 2019-02-20 PROCEDURE — 99214 OFFICE O/P EST MOD 30 MIN: CPT | Performed by: INTERNAL MEDICINE

## 2019-02-20 PROCEDURE — G8417 CALC BMI ABV UP PARAM F/U: HCPCS | Performed by: INTERNAL MEDICINE

## 2019-02-20 PROCEDURE — G8427 DOCREV CUR MEDS BY ELIG CLIN: HCPCS | Performed by: INTERNAL MEDICINE

## 2019-02-20 PROCEDURE — G8484 FLU IMMUNIZE NO ADMIN: HCPCS | Performed by: INTERNAL MEDICINE

## 2019-02-20 ASSESSMENT — ENCOUNTER SYMPTOMS
NAUSEA: 0
BACK PAIN: 0
TROUBLE SWALLOWING: 0
DIARRHEA: 0
SINUS PRESSURE: 0
SORE THROAT: 0
ABDOMINAL PAIN: 0
RECTAL PAIN: 0
CONSTIPATION: 0
BLOOD IN STOOL: 0
WHEEZING: 0
VOMITING: 0
ANAL BLEEDING: 0
CHOKING: 0
ABDOMINAL DISTENTION: 0
COUGH: 0
VOICE CHANGE: 0

## 2019-02-25 PROBLEM — Z95.818 STATUS POST PLACEMENT OF IMPLANTABLE LOOP RECORDER: Status: ACTIVE | Noted: 2019-02-25

## 2019-02-25 PROBLEM — I34.0 MILD MITRAL REGURGITATION: Status: ACTIVE | Noted: 2019-02-25

## 2019-02-25 PROBLEM — I51.7 MILD CONCENTRIC LEFT VENTRICULAR HYPERTROPHY (LVH): Status: ACTIVE | Noted: 2019-02-25

## 2019-02-25 PROBLEM — Z45.09 ENCOUNTER FOR LOOP RECORDER CHECK: Status: ACTIVE | Noted: 2019-02-25

## 2019-03-09 DIAGNOSIS — Z72.0 TOBACCO ABUSE: ICD-10-CM

## 2019-03-11 RX ORDER — NICOTINE 21 MG/24HR
1 PATCH, TRANSDERMAL 24 HOURS TRANSDERMAL EVERY 24 HOURS
Qty: 28 PATCH | Refills: 1 | Status: ON HOLD | OUTPATIENT
Start: 2019-03-11 | End: 2019-06-26 | Stop reason: ALTCHOICE

## 2019-03-26 ENCOUNTER — TELEPHONE (OUTPATIENT)
Dept: FAMILY MEDICINE CLINIC | Age: 59
End: 2019-03-26

## 2019-03-27 PROBLEM — I51.7 ATRIAL ENLARGEMENT, BILATERAL: Status: ACTIVE | Noted: 2019-03-27

## 2019-03-27 PROBLEM — Z72.0 TOBACCO ABUSE: Status: ACTIVE | Noted: 2019-03-27

## 2019-03-29 ENCOUNTER — TELEPHONE (OUTPATIENT)
Dept: FAMILY MEDICINE CLINIC | Age: 59
End: 2019-03-29

## 2019-03-29 DIAGNOSIS — F17.210 CIGARETTE NICOTINE DEPENDENCE WITHOUT COMPLICATION: Primary | ICD-10-CM

## 2019-03-30 RX ORDER — VARENICLINE TARTRATE 25 MG
KIT ORAL
Qty: 42 TABLET | Refills: 0 | Status: SHIPPED | OUTPATIENT
Start: 2019-03-30 | End: 2019-05-29

## 2019-03-30 RX ORDER — VARENICLINE TARTRATE 1 MG/1
1 TABLET, FILM COATED ORAL 2 TIMES DAILY
Qty: 60 TABLET | Refills: 3 | Status: SHIPPED | OUTPATIENT
Start: 2019-03-30 | End: 2019-05-29 | Stop reason: SDUPTHER

## 2019-03-30 NOTE — TELEPHONE ENCOUNTER
Sent to pharmacy. Starting and continuing pack. Do not use nicotine patches with this, no enhancement in quitting smoking in studies.  -SR

## 2019-04-22 RX ORDER — LACTULOSE 10 G/15ML
SOLUTION ORAL
Qty: 3600 ML | Refills: 3 | Status: SHIPPED | OUTPATIENT
Start: 2019-04-22 | End: 2019-11-17 | Stop reason: SDUPTHER

## 2019-05-02 ENCOUNTER — TELEPHONE (OUTPATIENT)
Dept: FAMILY MEDICINE CLINIC | Age: 59
End: 2019-05-02

## 2019-05-02 DIAGNOSIS — Z12.39 BREAST CANCER SCREENING: Primary | ICD-10-CM

## 2019-05-02 NOTE — TELEPHONE ENCOUNTER
Patient phoned today and requested a mammogram order for an appointment made on 5/24/2019 at [facility]. Patient needs order faxed to [facility]. Please advise.   Order pended for approval.

## 2019-05-28 DIAGNOSIS — Z12.39 BREAST CANCER SCREENING: ICD-10-CM

## 2019-05-28 NOTE — PROGRESS NOTES
GI CLINIC FOLLOW UP    INTERVAL HISTORY:   No referring provider defined for this encounter. Chief Complaint   Patient presents with    Constipation     Patient is here today for EGD f/u. She states she has been constipated for about 3-4 months. She states she takes her lactulose and miralax and she still can go 5 days without having BM.  Cirrhosis     Patient also has hx of alcohol cirrhosis             HISTORY OF PRESENT ILLNESS: Naomi Flowers is a 64 y.o. female , referred for evaluation of liver cirrhosis. Patient is here for follow-up, was having some hematemesis EGD did not show any significant varices as below showed Gabo ulcers. She's been sober for 8 months, she is doing very well she has no ascites no lower extremity edema no sinus symptoms to indicate any significant exacerbation of liver cirrhosis    Be due for colonoscopy she doesn't have one for more than 5 years she said  Constipated she takes lactulose and MiraLAX and that does help             PROCEDURE NOTE    DATE OF PROCEDURE: 4/2/2019     SURGEON: Norma Burrows MD    ASSISTANT: None    PREOPERATIVE DIAGNOSIS: hematemesis  hx E varices  hx of liver cirrhosis     POSTOPERATIVE DIAGNOSIS:   No varices  large H H with gabo erosions  gastritis, bxs taken     OPERATION: Upper GI endoscopy with Biopsy    ANESTHESIA: Moderate Sedation     ESTIMATED BLOOD LOSS: Less than 50 ml    COMPLICATIONS: None. SPECIMENS: was obtained    HISTORY: The patient is a 64y.o. year old female with history of above preop diagnosis. I recommended esophagogastroduodenoscopy with possible biopsy and I explained the risk, benefits, expected outcome, and alternatives to the procedure. Risks included but are not limited to bleeding, infection, respiratory distress, hypotension, and perforation of the esophagus, stomach, or duodenum. Patient understands and is in agreement. Informed consent was obtained for the procedure, including sedation. Risks of perforation, hemorrhage, adverse drug reaction and aspiration were discussed. The patient was placed in the left lateral decubitus position. Based on the pre-procedure assessment, including review of the patient's medical history, medications, allergies, and review of systems, she had been deemed to be an appropriate candidate for conscious sedation; she was therefore sedated with the medications listed below. The patient was monitored continuously with ECG tracing, pulse oximetry, blood pressure monitoring, and direct observations. PROCEDURE: The patient was given IV conscious sedation. The patient's SPO2 remained above 90% throughout the procedure. The gastroscope was inserted orally and advanced under direct vision through the esophagus, through the stomach, through the pylorus, and into the descending duodenum. Findings:    Retropharyngeal area was grossly normal appearing    Esophagus: normal,No varices    Stomach:  Fundus: abnormal: No varices  large H H with tamika erosions  Body: abnormal: gastritis, bxs taken   Antrum: abnormal: gastritis, bxs taken     Duodenum:   Descending: normal  Bulb: abnormal: duodenitis     The scope was removed and the patient tolerated the procedure well. Recommendations/Plan:   1. F/U Biopsies  2. F/U In Office in 3-4 weeks  3. Discussed with the family    Electronically signed by Sai Tristan MD on 4/2/2019 at 8:38 AM     Electronically signed by Sai Tristan MD at 04/02/2019 8:40 AM            Past Medical,Family, and Social History reviewed and does contribute to the patient presentingcondition. Patient's PMH/PSH,SH,PSYCH Hx, MEDs, ALLERGIES, and ROS were all reviewed and updated in the appropriate sections.     PAST MEDICAL HISTORY:  Past Medical History:   Diagnosis Date    Alcohol abuse, unspecified     [facility] [address]     Anxiety     Arrhythmia     Asthma     Bipolar disorder ([facility] [address]     Cirrhosis of liver ([facility] [address]     Constipation     Depression     GERD (gastroesophageal reflux disease)     Hepatic encephalopathy (HCC)     Hepatitis C     Pancreatitis, alcoholic     Pulmonary hypertension (HCC)     Seizures (HCC)     5 in the past related to alcohol withdrawal       Past Surgical History:   Procedure Laterality Date     SECTION      CHOLECYSTECTOMY      COLONOSCOPY  14    NORMAL    ERCP N/A 3/8/2018    ERCP SPINCTER/PAPILLOTOMY; BALLOON SWEEP, STONE EXTRACTION & BIOPSY performed by Jose Miguel Chau MD at [facility]  2018    [employer]    KNEE ARTHROSCOPY Left 2016    Arthroscopic Chondroplasty, Lateral meniscectomy    UPPER GASTROINTESTINAL ENDOSCOPY  14    SCHATZKI RING, MILD CHRONIC GASTRITIS    UPPER GASTROINTESTINAL ENDOSCOPY  2015    MILD CHRONIC GASTRITIS       CURRENT MEDICATIONS:    Current Outpatient Medications:     varenicline (CHANTIX CONTINUING MONTH XOCHITL) 1 MG tablet, Take 1 tablet by mouth 2 times daily, Disp: 60 tablet, Rfl: 0    lactulose (CHRONULAC) 10 GM/15ML solution, take 30 milliliters by mouth four times a day, Disp: 3600 mL, Rfl: 3    nicotine (SM NICOTINE) 14 MG/24HR, Place 1 patch onto the skin every 24 hours, Disp: 28 patch, Rfl: 1    Mirtazapine (REMERON PO), Take by mouth nightly, Disp: , Rfl:     lidocaine (LIDODERM) 5 %, Place 1 patch onto the skin daily 12 hours on, 12 hours off., Disp: 30 patch, Rfl: 0    Cholecalciferol (VITAMIN D) 2000 units CAPS capsule, Take 1 capsule by mouth daily, Disp: , Rfl:     lactulose (CHRONULAC) 10 GM/15ML solution, Take 20 g by mouth daily, Disp: , Rfl:     buPROPion (WELLBUTRIN SR) 150 MG extended release tablet, Take 1 tablet by mouth 2 times daily, Disp: 60 tablet, Rfl: 3    CARTIA  MG extended release capsule, Take 120 mg by mouth daily, Disp: , Rfl: 0    albuterol sulfate HFA (PROAIR HFA) 108 (90 Base) MCG/ACT inhaler, Inhale 2 puffs into the lungs every 6 hours as needed for Wheezing, Disp: 1 Inhaler, Rfl: 0    traZODone (DESYREL) 100 MG tablet, take 1 tablet by mouth at bedtime prn, Disp: , Rfl: 0    alendronate (FOSAMAX) 70 MG tablet, Take 1 tablet by mouth every 7 days, Disp: 4 tablet, Rfl: 3    oxybutynin (DITROPAN XL) 10 MG CR tablet, Take 1 tablet by mouth daily, Disp: 30 tablet, Rfl: 5    calcium carbonate 600 MG TABS tablet, Take 1 tablet by mouth daily, Disp: , Rfl:     vitamin B-12 (CYANOCOBALAMIN) 1000 MCG tablet, Take 1,000 mcg by mouth daily, Disp: , Rfl:     furosemide (LASIX) 40 MG tablet, Take 1 tablet by mouth daily, Disp: 60 tablet, Rfl: 3    spironolactone (ALDACTONE) 100 MG tablet, Take 1 tablet by mouth daily, Disp: 30 tablet, Rfl: 3    magnesium oxide (MAG-OX) 400 MG tablet, Take 400 mg by mouth daily 2 tabs twice a day , Disp: , Rfl:     QUEtiapine (SEROQUEL) 300 MG tablet, Take 1 tablet by mouth nightly., Disp: 30 tablet, Rfl: 0    naproxen (NAPROSYN) 500 MG tablet, Take 1 tablet by mouth 2 times daily (with meals) for 15 days, Disp: 30 tablet, Rfl: 0    ibuprofen (ADVIL;MOTRIN) 800 MG tablet, Take 1 tablet by mouth every 8 hours as needed for Pain, Disp: 90 tablet, Rfl: 0    ALLERGIES:   Allergies   Allergen Reactions    Chantix [Varenicline] Other (See Comments)     Bad dreams    Penicillins        FAMILY HISTORY:       Problem Relation Age of Onset    Cancer Father         Bladder    Cancer Brother          SOCIAL HISTORY:   Social History     Socioeconomic History    Marital status:      Spouse name: Not on file    Number of children: Not on file    Years of education: Not on file    Highest education level: Not on file   Occupational History    Not on file   Social Needs    Financial resource strain: Not on file    Food insecurity:     Worry: Not on file     Inability: Not on file    Transportation needs:     Medical: Not on file     Non-medical: Not on file   Tobacco Use    Smoking status: Current Every Day Smoker     Packs/day: 0.25     Years: 33.00     Pack years: 8.25     Types: Cigarettes    Smokeless tobacco: Never Used    Tobacco comment: about 3-4 cigs a day, trying to quit   Substance and Sexual Activity    Alcohol use: Yes     Alcohol/week: 0.0 oz     Comment: 05/11/15 - rare     Drug use: No     Comment: none    Sexual activity: Not on file   Lifestyle    Physical activity:     Days per week: Not on file     Minutes per session: Not on file    Stress: Not on file   Relationships    Social connections:     Talks on phone: Not on file     Gets together: Not on file     Attends [employer] service: Not on file     Active member of club or organization: Not on file     Attends meetings of clubs or organizations: Not on file     Relationship status: Not on file    Intimate partner violence:     Fear of current or ex partner: Not on file     Emotionally abused: Not on file     Physically abused: Not on file     Forced sexual activity: Not on file   Other Topics Concern    Not on file   Social History Narrative    Not on file       REVIEW OF SYSTEMS: A 12-point review of systemswas obtained and pertinent positives and negatives were enumerated above in the history of present illness. All other reviewed systems / symptoms were negative. Review of Systems   Constitutional: Positive for fatigue. Negative for appetite change and unexpected weight change. HENT: Negative for trouble swallowing. Respiratory: Negative for cough, choking and wheezing. Cardiovascular: Negative for chest pain, palpitations and leg swelling. Gastrointestinal: Positive for abdominal pain (lower when constipated) and constipation. Negative for abdominal distention, anal bleeding, blood in stool, diarrhea, nausea, rectal pain and vomiting. Genitourinary: Negative for difficulty urinating. Allergic/Immunologic: Negative for environmental allergies and food allergies.    Neurological: Negative for dizziness, weakness, light-headedness, numbness and headaches. Hematological: Does not bruise/bleed easily. Psychiatric/Behavioral: Negative for sleep disturbance. The patient is nervous/anxious. LABORATORY DATA: Reviewed  Lab Results   Component Value Date    WBC 3.4 (L) 03/08/2018    HGB 14.4 03/08/2018    HCT 41.0 03/08/2018    .1 (H) 03/08/2018    PLT 71 (L) 03/08/2018     05/16/2018    K 4.2 05/16/2018     05/16/2018    CO2 20 05/16/2018    BUN 12 05/16/2018    CREATININE 0.72 05/16/2018    LABPROT 5.3 (L) 01/05/2013    LABALBU 4.0 03/08/2018    BILITOT 2.55 (H) 03/08/2018    ALKPHOS 314 (H) 03/08/2018    AST 35 (H) 03/08/2018    ALT 20 03/08/2018    INR 1.2 03/08/2018         Lab Results   Component Value Date    RBC 4.01 03/08/2018    HGB 14.4 03/08/2018    .1 (H) 03/08/2018    MCH 35.9 (H) 03/08/2018    MCHC 35.1 03/08/2018    RDW 14.0 03/08/2018    MPV 8.2 03/08/2018    BASOPCT 0 03/08/2018    LYMPHSABS 0.71 (L) 03/08/2018    MONOSABS 0.41 03/08/2018    NEUTROABS 2.25 03/08/2018    EOSABS 0.03 03/08/2018    BASOSABS 0.00 03/08/2018         DIAGNOSTIC TESTING:     No results found. PHYSICAL EXAMINATION: Vital signs reviewed per the nursing documentation. /62   Pulse 70   Wt 172 lb (78 kg)   LMP 07/19/2006   BMI 26.15 kg/m²   Body mass index is 26.15 kg/m². Physical Exam   Constitutional: She appears well-developed and well-nourished. Eyes: Pupils are equal, round, and reactive to light. No scleral icterus. Neck: Neck supple. No JVD present. Cardiovascular: Normal rate, regular rhythm and normal heart sounds. No murmur heard. Pulmonary/Chest: Effort normal and breath sounds normal. No respiratory distress. Abdominal:   No ascites   Nursing note and vitals reviewed. IMPRESSION: Ms. Adrián Oscar is a 64 y.o. female with    Diagnosis Orders   1.  Alcoholic cirrhosis of liver without ascites (HCC)  Comp Metabolic w Bili Profile    CBC Auto Differential    AFP Tumor Marker    EGD   2. Chronic hepatitis C without hepatic coma (HCC)  Comp Metabolic w Bili Profile    CBC Auto Differential    AFP Tumor Marker   3. Other chronic pancreatitis ([address]     4. Screen for colon cancer  COLONOSCOPY W/ OR W/O BIOPSY     Will proceed with the above evaluate her situation this time and calculate her meld score  Assured and encouraged to continue to stay sober  She is due for colonoscopy especially with the constipation  Will repeat the EGD also a look at the [facility], [facility] ulcers and look of there is any other symptoms of hematemesis    Diet/life style/natural hx /complication of the dx were all explained in details   Past medical, past surgical, social history, psychiatric history, medications or allergies, all reviewed and  updated        Thank you for allowing me to participate in the care of Ms. Kahlil Tinajero. For any further questions please do not hesitate to contact me. I have reviewed and agree with the ROS entered by the MA/LPN. Note is dictated utilizing voice recognition software. Unfortunately this leads to occasional typographical errors. Please contact our office if you have any questions.       Yulisa Castellanos MD  [facility]  O: #[phone]

## 2019-05-29 ENCOUNTER — OFFICE VISIT (OUTPATIENT)
Dept: GASTROENTEROLOGY | Age: 59
End: 2019-05-29
Payer: MEDICAID

## 2019-05-29 ENCOUNTER — TELEPHONE (OUTPATIENT)
Dept: GASTROENTEROLOGY | Age: 59
End: 2019-05-29

## 2019-05-29 VITALS
WEIGHT: 172 LBS | BODY MASS INDEX: 26.15 KG/M2 | HEART RATE: 70 BPM | SYSTOLIC BLOOD PRESSURE: 116 MMHG | DIASTOLIC BLOOD PRESSURE: 62 MMHG

## 2019-05-29 DIAGNOSIS — B18.2 CHRONIC HEPATITIS C WITHOUT HEPATIC COMA (HCC): ICD-10-CM

## 2019-05-29 DIAGNOSIS — R93.1 ABNORMAL ECHOCARDIOGRAM: ICD-10-CM

## 2019-05-29 DIAGNOSIS — Z12.11 SCREEN FOR COLON CANCER: ICD-10-CM

## 2019-05-29 DIAGNOSIS — K86.1 OTHER CHRONIC PANCREATITIS (HCC): ICD-10-CM

## 2019-05-29 DIAGNOSIS — R06.02 SOB (SHORTNESS OF BREATH): ICD-10-CM

## 2019-05-29 DIAGNOSIS — R94.2 ABNORMAL PFT: ICD-10-CM

## 2019-05-29 DIAGNOSIS — K70.30 ALCOHOLIC CIRRHOSIS OF LIVER WITHOUT ASCITES (HCC): Primary | ICD-10-CM

## 2019-05-29 DIAGNOSIS — J44.9 CHRONIC OBSTRUCTIVE PULMONARY DISEASE, UNSPECIFIED COPD TYPE (HCC): ICD-10-CM

## 2019-05-29 PROCEDURE — G8417 CALC BMI ABV UP PARAM F/U: HCPCS | Performed by: INTERNAL MEDICINE

## 2019-05-29 PROCEDURE — 4004F PT TOBACCO SCREEN RCVD TLK: CPT | Performed by: INTERNAL MEDICINE

## 2019-05-29 PROCEDURE — G8428 CUR MEDS NOT DOCUMENT: HCPCS | Performed by: INTERNAL MEDICINE

## 2019-05-29 PROCEDURE — 99214 OFFICE O/P EST MOD 30 MIN: CPT | Performed by: INTERNAL MEDICINE

## 2019-05-29 PROCEDURE — 3017F COLORECTAL CA SCREEN DOC REV: CPT | Performed by: INTERNAL MEDICINE

## 2019-05-29 RX ORDER — VARENICLINE TARTRATE 1 MG/1
1 TABLET, FILM COATED ORAL 2 TIMES DAILY
Qty: 60 TABLET | Refills: 0 | Status: SHIPPED | OUTPATIENT
Start: 2019-05-29 | End: 2019-09-19 | Stop reason: ALTCHOICE

## 2019-05-29 RX ORDER — SODIUM, POTASSIUM,MAG SULFATES 17.5-3.13G
SOLUTION, RECONSTITUTED, ORAL ORAL
Qty: 1 BOTTLE | Refills: 0 | Status: SHIPPED | OUTPATIENT
Start: 2019-05-29 | End: 2020-02-11 | Stop reason: ALTCHOICE

## 2019-05-29 ASSESSMENT — ENCOUNTER SYMPTOMS
ABDOMINAL DISTENTION: 0
WHEEZING: 0
ABDOMINAL PAIN: 1
TROUBLE SWALLOWING: 0
BLOOD IN STOOL: 0
VOMITING: 0
ANAL BLEEDING: 0
DIARRHEA: 0
CONSTIPATION: 1
COUGH: 0
CHOKING: 0
NAUSEA: 0
RECTAL PAIN: 0

## 2019-06-06 ENCOUNTER — HOSPITAL ENCOUNTER (OUTPATIENT)
Age: 59
Setting detail: SPECIMEN
Discharge: HOME | End: 2019-06-06
Payer: MEDICAID

## 2019-06-06 DIAGNOSIS — B18.2 CHRONIC HEPATITIS C WITHOUT HEPATIC COMA (HCC): ICD-10-CM

## 2019-06-06 DIAGNOSIS — K70.30 ALCOHOLIC CIRRHOSIS OF LIVER WITHOUT ASCITES (HCC): ICD-10-CM

## 2019-06-06 LAB
ABSOLUTE EOS #: 0.18 K/UL (ref 0–0.44)
ABSOLUTE IMMATURE GRANULOCYTE: <0.03 K/UL (ref 0–0.3)
ABSOLUTE LYMPH #: 1.03 K/UL (ref 1.1–3.7)
ABSOLUTE MONO #: 0.32 K/UL (ref 0.1–1.2)
AFP: 2.6 UG/L
ALBUMIN SERPL-MCNC: 3.6 G/DL (ref 3.5–5.2)
ALBUMIN/GLOBULIN RATIO: 1.2 (ref 1–2.5)
ALP BLD-CCNC: 140 U/L (ref 35–104)
ALT SERPL-CCNC: 26 U/L (ref 5–33)
ANION GAP SERPL CALCULATED.3IONS-SCNC: 13 MMOL/L (ref 9–17)
AST SERPL-CCNC: 34 U/L
BASOPHILS # BLD: 1 % (ref 0–2)
BASOPHILS ABSOLUTE: <0.03 K/UL (ref 0–0.2)
BILIRUB SERPL-MCNC: 1.27 MG/DL (ref 0.3–1.2)
BILIRUBIN DIRECT: 0.38 MG/DL
BILIRUBIN, INDIRECT: 0.89 MG/DL (ref 0–1)
BUN BLDV-MCNC: 9 MG/DL (ref 6–20)
CALCIUM SERPL-MCNC: 9.2 MG/DL (ref 8.6–10.4)
CHLORIDE BLD-SCNC: 108 MMOL/L (ref 98–107)
CO2: 22 MMOL/L (ref 20–31)
CREAT SERPL-MCNC: 0.78 MG/DL (ref 0.5–0.9)
DIFFERENTIAL TYPE: ABNORMAL
EOSINOPHILS RELATIVE PERCENT: 6 % (ref 1–4)
GFR AFRICAN AMERICAN: >60 ML/MIN
GFR NON-AFRICAN AMERICAN: >60 ML/MIN
GFR SERPL CREATININE-BSD FRML MDRD: ABNORMAL ML/MIN/{1.73_M2}
GFR SERPL CREATININE-BSD FRML MDRD: ABNORMAL ML/MIN/{1.73_M2}
GLUCOSE BLD-MCNC: 90 MG/DL (ref 70–99)
HCT VFR BLD CALC: 42.1 % (ref 36.3–47.1)
HEMOGLOBIN: 14.3 G/DL (ref 11.9–15.1)
IMMATURE GRANULOCYTES: 0 %
LYMPHOCYTES # BLD: 32 % (ref 24–43)
MCH RBC QN AUTO: 32.6 PG (ref 25.2–33.5)
MCHC RBC AUTO-ENTMCNC: 34 G/DL (ref 28.4–34.8)
MCV RBC AUTO: 96.1 FL (ref 82.6–102.9)
MONOCYTES # BLD: 10 % (ref 3–12)
NRBC AUTOMATED: 0 PER 100 WBC
PDW BLD-RTO: 13.8 % (ref 11.8–14.4)
PLATELET # BLD: 77 K/UL (ref 138–453)
PLATELET ESTIMATE: ABNORMAL
PMV BLD AUTO: 10.7 FL (ref 8.1–13.5)
POTASSIUM SERPL-SCNC: 4.2 MMOL/L (ref 3.7–5.3)
RBC # BLD: 4.38 M/UL (ref 3.95–5.11)
RBC # BLD: ABNORMAL 10*6/UL
SEG NEUTROPHILS: 52 % (ref 36–65)
SEGMENTED NEUTROPHILS ABSOLUTE COUNT: 1.66 K/UL (ref 1.5–8.1)
SODIUM BLD-SCNC: 143 MMOL/L (ref 135–144)
TOTAL PROTEIN: 6.5 G/DL (ref 6.4–8.3)
WBC # BLD: 3.2 K/UL (ref 3.5–11.3)
WBC # BLD: ABNORMAL 10*3/UL

## 2019-06-21 ENCOUNTER — TELEPHONE (OUTPATIENT)
Dept: GASTROENTEROLOGY | Age: 59
End: 2019-06-21

## 2019-06-21 NOTE — TELEPHONE ENCOUNTER
Writer left msg on pt's vm to call back & confirm she will be here for EGD/colon proc sched wendie/ Yari at [facility] Wed 6/26/19 @ 11:15am proc time, 9:15am arrival time.  Also confirm she will have a  and ask if she has any questions regarding her bowel prep inst

## 2019-06-24 NOTE — TELEPHONE ENCOUNTER
Patient called to confirm arrival time. Advised patient she needs to be at [facility] at 9:15am on 06/26/19.

## 2019-06-26 ENCOUNTER — HOSPITAL ENCOUNTER (OUTPATIENT)
Age: 59
Setting detail: OUTPATIENT SURGERY
Discharge: HOME | End: 2019-06-26
Attending: INTERNAL MEDICINE | Admitting: INTERNAL MEDICINE
Payer: MEDICAID

## 2019-06-26 ENCOUNTER — ANESTHESIA EVENT (OUTPATIENT)
Dept: ENDOSCOPY | Age: 59
End: 2019-06-26
Payer: MEDICAID

## 2019-06-26 ENCOUNTER — ANESTHESIA (OUTPATIENT)
Dept: ENDOSCOPY | Age: 59
End: 2019-06-26
Payer: MEDICAID

## 2019-06-26 VITALS
DIASTOLIC BLOOD PRESSURE: 67 MMHG | RESPIRATION RATE: 16 BRPM | OXYGEN SATURATION: 94 % | HEART RATE: 76 BPM | WEIGHT: 163 LBS | BODY MASS INDEX: 24.71 KG/M2 | TEMPERATURE: 96.3 F | SYSTOLIC BLOOD PRESSURE: 116 MMHG | HEIGHT: 68 IN

## 2019-06-26 VITALS — OXYGEN SATURATION: 93 % | DIASTOLIC BLOOD PRESSURE: 65 MMHG | SYSTOLIC BLOOD PRESSURE: 93 MMHG

## 2019-06-26 PROCEDURE — 43239 EGD BIOPSY SINGLE/MULTIPLE: CPT | Performed by: INTERNAL MEDICINE

## 2019-06-26 PROCEDURE — 6370000000 HC RX 637 (ALT 250 FOR IP): Performed by: INTERNAL MEDICINE

## 2019-06-26 PROCEDURE — 7100000001 HC PACU RECOVERY - ADDTL 15 MIN: Performed by: INTERNAL MEDICINE

## 2019-06-26 PROCEDURE — 2580000003 HC RX 258: Performed by: ANESTHESIOLOGY

## 2019-06-26 PROCEDURE — 7100000030 HC ASPR PHASE II RECOVERY - FIRST 15 MIN: Performed by: INTERNAL MEDICINE

## 2019-06-26 PROCEDURE — 3609009500 HC COLONOSCOPY DIAGNOSTIC OR SCREENING: Performed by: INTERNAL MEDICINE

## 2019-06-26 PROCEDURE — 7100000031 HC ASPR PHASE II RECOVERY - ADDTL 15 MIN: Performed by: INTERNAL MEDICINE

## 2019-06-26 PROCEDURE — 6360000002 HC RX W HCPCS: Performed by: NURSE ANESTHETIST, CERTIFIED REGISTERED

## 2019-06-26 PROCEDURE — 3700000001 HC ADD 15 MINUTES (ANESTHESIA): Performed by: INTERNAL MEDICINE

## 2019-06-26 PROCEDURE — 3700000000 HC ANESTHESIA ATTENDED CARE: Performed by: INTERNAL MEDICINE

## 2019-06-26 PROCEDURE — 2709999900 HC NON-CHARGEABLE SUPPLY: Performed by: INTERNAL MEDICINE

## 2019-06-26 PROCEDURE — 3609012400 HC EGD TRANSORAL BIOPSY SINGLE/MULTIPLE: Performed by: INTERNAL MEDICINE

## 2019-06-26 PROCEDURE — 88305 TISSUE EXAM BY PATHOLOGIST: CPT

## 2019-06-26 PROCEDURE — 45378 DIAGNOSTIC COLONOSCOPY: CPT | Performed by: INTERNAL MEDICINE

## 2019-06-26 PROCEDURE — 2500000003 HC RX 250 WO HCPCS: Performed by: NURSE ANESTHETIST, CERTIFIED REGISTERED

## 2019-06-26 PROCEDURE — 7100000000 HC PACU RECOVERY - FIRST 15 MIN: Performed by: INTERNAL MEDICINE

## 2019-06-26 RX ORDER — PROPOFOL 10 MG/ML
INJECTION, EMULSION INTRAVENOUS CONTINUOUS PRN
Status: DISCONTINUED | OUTPATIENT
Start: 2019-06-26 | End: 2019-06-26 | Stop reason: SDUPTHER

## 2019-06-26 RX ORDER — LIDOCAINE HYDROCHLORIDE 10 MG/ML
INJECTION, SOLUTION EPIDURAL; INFILTRATION; INTRACAUDAL; PERINEURAL PRN
Status: DISCONTINUED | OUTPATIENT
Start: 2019-06-26 | End: 2019-06-26 | Stop reason: SDUPTHER

## 2019-06-26 RX ORDER — PROPOFOL 10 MG/ML
INJECTION, EMULSION INTRAVENOUS PRN
Status: DISCONTINUED | OUTPATIENT
Start: 2019-06-26 | End: 2019-06-26 | Stop reason: SDUPTHER

## 2019-06-26 RX ORDER — SODIUM CHLORIDE, SODIUM LACTATE, POTASSIUM CHLORIDE, CALCIUM CHLORIDE 600; 310; 30; 20 MG/100ML; MG/100ML; MG/100ML; MG/100ML
INJECTION, SOLUTION INTRAVENOUS CONTINUOUS
Status: DISCONTINUED | OUTPATIENT
Start: 2019-06-26 | End: 2019-06-26 | Stop reason: HOSPADM

## 2019-06-26 RX ADMIN — PROPOFOL 50 MG: 10 INJECTION, EMULSION INTRAVENOUS at 11:24

## 2019-06-26 RX ADMIN — SODIUM CHLORIDE, POTASSIUM CHLORIDE, SODIUM LACTATE AND CALCIUM CHLORIDE: 600; 310; 30; 20 INJECTION, SOLUTION INTRAVENOUS at 10:04

## 2019-06-26 RX ADMIN — LIDOCAINE HYDROCHLORIDE 25 MG: 10 INJECTION, SOLUTION EPIDURAL; INFILTRATION; INTRACAUDAL; PERINEURAL at 11:24

## 2019-06-26 RX ADMIN — PROPOFOL 50 MG: 10 INJECTION, EMULSION INTRAVENOUS at 11:27

## 2019-06-26 RX ADMIN — PROPOFOL 200 MCG/KG/MIN: 10 INJECTION, EMULSION INTRAVENOUS at 11:26

## 2019-06-26 ASSESSMENT — PULMONARY FUNCTION TESTS
PIF_VALUE: 1
PIF_VALUE: 1
PIF_VALUE: 0
PIF_VALUE: 0
PIF_VALUE: 1
PIF_VALUE: 0
PIF_VALUE: 0
PIF_VALUE: 1
PIF_VALUE: 0
PIF_VALUE: 1
PIF_VALUE: 0
PIF_VALUE: 1
PIF_VALUE: 0
PIF_VALUE: 1
PIF_VALUE: 0
PIF_VALUE: 1
PIF_VALUE: 1
PIF_VALUE: 0
PIF_VALUE: 1
PIF_VALUE: 1
PIF_VALUE: 0
PIF_VALUE: 1
PIF_VALUE: 0
PIF_VALUE: 1
PIF_VALUE: 0
PIF_VALUE: 1

## 2019-06-26 ASSESSMENT — PAIN SCALES - GENERAL
PAINLEVEL_OUTOF10: 0
PAINLEVEL_OUTOF10: 0

## 2019-06-26 ASSESSMENT — PAIN - FUNCTIONAL ASSESSMENT: PAIN_FUNCTIONAL_ASSESSMENT: 0-10

## 2019-06-26 NOTE — OP NOTE
PROCEDURE NOTE    DATE OF PROCEDURE: 6/26/2019     SURGEON: Sunita Aparicio MD  Facility: [facility]  ASSISTANT: None  Anesthesia: MAc  PREOPERATIVE DIAGNOSIS:   Liver cirrhosis   GERD    Diagnosis:  No varices seen at all   irregular Z line, gentle bx taken    gastritis, bxs taken     POSTOPERATIVE DIAGNOSIS: As described below    OPERATION: Upper GI endoscopy with Biopsy    ANESTHESIA: Moderate Sedation     ESTIMATED BLOOD LOSS: Less than 50 ml    COMPLICATIONS: None. SPECIMENS:  Was Obtained:     No varices seen at all   irregular Z line, gentle bx taken    gastritis, bxs taken     HISTORY: The patient is a 64y.o. year old female with history of above preop diagnosis. I recommended esophagogastroduodenoscopy with possible biopsy and I explained the risk, benefits, expected outcome, and alternatives to the procedure. Risks included but are not limited to bleeding, infection, respiratory distress, hypotension, and perforation of the esophagus, stomach, or duodenum. Patient understands and is in agreement. PROCEDURE: The patient was given IV conscious sedation. The patient's SPO2 remained above 90% throughout the procedure. The gastroscope was inserted orally and advanced under direct vision through the esophagus, through the stomach, through the pylorus, and into the descending duodenum. Post sedation note : The patient's SPO2 remained above 90% throughout the procedure. the vital signs remained stable , and no immediate complication form the procedure noted, patient will be ready for d/c when criteria is met .       Findings:    Retropharyngeal area was grossly normal appearing    Esophagus: abnormal: No varices seen at all   irregular Z line, gentle bx taken       Stomach:    Fundus: abnormal:  gastritis, bxs taken     Body: abnormal:  gastritis, bxs taken     Antrum: abnormal:  gastritis, bxs taken     Duodenum:     Descending: normal    Bulb: normal    The scope was removed and the patient

## 2019-06-26 NOTE — ANESTHESIA PRE PROCEDURE
Department of Anesthesiology  Preprocedure Note       Name:  Julee Armando   Age:  64 y.o.  :  1960                                          MRN:  [id number]         Date:  2019      Surgeon: Sandra Min):  Stephany Abbott MD    Procedure: EGD ESOPHAGOGASTRODUODENOSCOPY (N/A Esophagus)  COLONOSCOPY DIAGNOSTIC (N/A )    Medications prior to admission:   Prior to Admission medications    Medication Sig Start Date End Date Taking? Authorizing Provider   varenicline (CHANTIX CONTINUING MONTH XOCHITL) 1 MG tablet Take 1 tablet by mouth 2 times daily 19 Yes OLYA Rodriguez CNP   Na Sulfate-K Sulfate-Mg Sulf 17.5-3.13-1.6 GM/177ML SOLN Use as directed in your patient instructions.  19  Yes Nelda Greenberg MD   lactulose (CHRONULAC) 10 GM/15ML solution take 30 milliliters by mouth four times a day 19  Yes Stephany Abbott MD   Mirtazapine (REMERON PO) Take by mouth nightly   Yes Historical Provider, MD   naproxen (NAPROSYN) 500 MG tablet Take 1 tablet by mouth 2 times daily (with meals) for 15 days 18 Yes OLYA Rodriguez CNP   Cholecalciferol (VITAMIN D) 2000 units CAPS capsule Take 1 capsule by mouth daily   Yes Historical Provider, MD   buPROPion [facility]) 150 MG extended release tablet Take 1 tablet by mouth 2 times daily 18  Yes OLYA Gaspar CNP   CARTIA  MG extended release capsule Take 120 mg by mouth daily 18  Yes Historical Provider, MD   ibuprofen (ADVIL;MOTRIN) 800 MG tablet Take 1 tablet by mouth every 8 hours as needed for Pain 18 Yes OLYA Covarrubias CNP   oxybutynin (DITROPAN XL) 10 MG CR tablet Take 1 tablet by mouth daily 16 Yes OLYA Covarrubias CNP   calcium carbonate 600 MG TABS tablet Take 1 tablet by mouth daily   Yes Historical Provider, MD   vitamin B-12 (CYANOCOBALAMIN) 1000 MCG tablet Take 1,000 mcg by mouth daily   Yes Historical Provider, MD   QUEtiapine (SEROQUEL) 300 MG tablet Take 1  Alcoholic cirrhosis of liver (HCC)     Anxiety     Arrhythmia     Asthma     Bipolar disorder (HCC)     Cirrhosis of liver (HCC)     Constipation     COPD (chronic obstructive pulmonary disease) (HCC)     Depression     GERD (gastroesophageal reflux disease)     Hepatic encephalopathy (HCC)     Hepatitis C     Pancreatitis, alcoholic     Pulmonary hypertension (HCC)     Seizures ([facility] [address]     5 in the past related to alcohol withdrawal       Past Surgical History:        Procedure Laterality Date     SECTION      CHOLECYSTECTOMY      COLONOSCOPY  14    NORMAL    ERCP N/A 3/8/2018    ERCP SPINCTER/PAPILLOTOMY; BALLOON SWEEP, STONE EXTRACTION & BIOPSY performed by Carlton Booker MD at [facility]  2018    [employer]    KNEE ARTHROSCOPY Left 2016    Arthroscopic Chondroplasty, Lateral meniscectomy    UPPER GASTROINTESTINAL ENDOSCOPY  14    SCHATZKI RING, MILD CHRONIC GASTRITIS    UPPER GASTROINTESTINAL ENDOSCOPY  2015    MILD CHRONIC GASTRITIS       Social History:    Social History     Tobacco Use    Smoking status: Current Every Day Smoker     Packs/day: 0.25     Years: 33.00     Pack years: 8.25     Types: Cigarettes    Smokeless tobacco: Never Used    Tobacco comment: about 3-4 cigs a day, trying to quit   Substance Use Topics    Alcohol use: Not Currently     Alcohol/week: 0.0 oz     Comment: 05/11/15 - rare                                 Ready to quit: Not Answered  Counseling given: Not Answered  Comment: about 3-4 cigs a day, trying to quit      Vital Signs (Current):   Vitals:    19 0934   BP: 101/62   Pulse: 72   Resp: 18   Temp: 97.5 °F (36.4 °C)   TempSrc: Oral   SpO2: 94%   Weight: 163 lb (73.9 kg)   Height: 5' 8\" (1.727 m)                                              BP Readings from Last 3 Encounters:   19 101/62   19 116/62   19 130/78       NPO Status: Time of last liquid consumption: 2359                        Time of last solid consumption: 2359                        Date of last liquid consumption: 06/25/19                        Date of last solid food consumption: 06/24/19    BMI:   Wt Readings from Last 3 Encounters:   06/26/19 163 lb (73.9 kg)   05/29/19 172 lb (78 kg)   03/27/19 168 lb (76.2 kg)     Body mass index is 24.78 kg/m². CBC:   Lab Results   Component Value Date    WBC 3.2 06/06/2019    RBC 4.38 06/06/2019    RBC 4.36 01/24/2012    HGB 14.3 06/06/2019    HCT 42.1 06/06/2019    MCV 96.1 06/06/2019    RDW 13.8 06/06/2019    PLT 77 06/06/2019    PLT 80 01/24/2012       CMP:   Lab Results   Component Value Date     06/06/2019    K 4.2 06/06/2019     06/06/2019    CO2 22 06/06/2019    BUN 9 06/06/2019    CREATININE 0.78 06/06/2019    GFRAA >60 06/06/2019    LABGLOM >60 06/06/2019    GLUCOSE 90 06/06/2019    GLUCOSE 137 06/07/2012    PROT 6.5 06/06/2019    PROT 6.2 10/07/2013    CALCIUM 9.2 06/06/2019    BILITOT 1.27 06/06/2019    ALKPHOS 140 06/06/2019    AST 34 06/06/2019    ALT 26 06/06/2019       POC Tests: No results for input(s): POCGLU, POCNA, POCK, POCCL, POCBUN, POCHEMO, POCHCT in the last 72 hours.     Coags:   Lab Results   Component Value Date    PROTIME 12.0 03/08/2018    PROTIME 11.9 10/07/2013    INR 1.2 03/08/2018    APTT 28.3 02/03/2015       HCG (If Applicable): No results found for: PREGTESTUR, PREGSERUM, HCG, HCGQUANT     ABGs: No results found for: PHART, PO2ART, PZB3AFJ, ZFA6WGX, BEART, K5DQIBRY     Type & Screen (If Applicable):  No results found for: LABABO, [address]    Anesthesia Evaluation  Patient summary reviewed and Nursing notes reviewed no history of anesthetic complications:   Airway: Mallampati: II  TM distance: >3 FB   Neck ROM: full  Mouth opening: > = 3 FB Dental:    (+) upper dentures      Pulmonary:normal exam  breath sounds clear to auscultation  (+) COPD:  asthma:                            Cardiovascular:    (+) dysrhythmias (Paroxysmal Atrial Fibrillation): atrial fibrillation, pulmonary hypertension:,         Rhythm: regular  Rate: normal                    Neuro/Psych:   (+) psychiatric history: stable with treatmentdepression/anxiety              ROS comment: Bipolar Disorder GI/Hepatic/Renal:   (+) GERD:, hepatitis: C, liver disease:,          ROS comment: H/o Pancreatitis  H/O Ascites s/p Paracentesis >10yrs ago  H/o Esophageal Varices s/p banding  Upper GI Bleed  . Endo/Other:                      ROS comment: Thrombocytopenia - last Platelet - 77 Abdominal:           Vascular:                                      Anesthesia Plan      MAC     ASA 3       Induction: intravenous. MIPS: Postoperative opioids intended and Prophylactic antiemetics administered. Anesthetic plan and risks discussed with patient. Plan discussed with CRNA.                   Gokul Corona MD   6/26/2019

## 2019-06-26 NOTE — ANESTHESIA POSTPROCEDURE EVALUATION
Department of Anesthesiology  Postprocedure Note    Patient: Isabel Zarate  MRN: [id number]  YOB: 1960  Date of evaluation: 6/26/2019  Time:  1:25 PM     Procedure Summary     Date:  06/26/19 Room / Location:  [facility] ENDO 03 / [facility] ENDO    Anesthesia Start:  1120 Anesthesia Stop:  8206    Procedures:       EGD BIOPSY (N/A Esophagus)      COLONOSCOPY DIAGNOSTIC (N/A ) Diagnosis:  (ALCOHOLIC CIRRHOSIS OF LIVER WITHOUT ASCITES  PAT ON ADMIT PER ANES)    Surgeon:  Jose Miguel Chau MD Responsible Provider:  Gokul Corona MD    Anesthesia Type:  MAC ASA Status:  3          Anesthesia Type: MAC    Alannah Phase I: Alannah Score: 10    Alannah Phase II:      Last vitals: Reviewed and per EMR flowsheets.        Anesthesia Post Evaluation    Comments: POST- ANESTHESIA EVALUATION       Pt Name: Isabel Zarate  MRN: [id number]  YOB: 1960  Date of evaluation: 6/26/2019  Time:  1:25 PM      /67   Pulse 76   Temp 96.3 °F (35.7 °C) (Oral)   Resp 16   Ht 5' 8\" (1.727 m)   Wt 163 lb (73.9 kg)   LMP 07/19/2006   SpO2 94%   BMI 24.78 kg/m²      Consciousness Level  Awake  Cardiopulmonary Status  Stable  Pain Adequately Treated YES  Nausea / Vomiting  NO  Adequate Hydration  YES  Anesthesia Related Complications NONE      Electronically signed by Gokul Corona MD on 6/26/2019 at 1:25 PM

## 2019-06-27 ENCOUNTER — TELEPHONE (OUTPATIENT)
Dept: GASTROENTEROLOGY | Age: 59
End: 2019-06-27

## 2019-06-27 NOTE — TELEPHONE ENCOUNTER
Pt returned call we made to her. She was already scheduled for her f/u in November.   She has elected to keep that appointment if someone will call her with her results when they come back

## 2019-06-28 LAB — SURGICAL PATHOLOGY REPORT: NORMAL

## 2019-07-09 PROBLEM — F10.11 HISTORY OF ALCOHOL ABUSE: Status: ACTIVE | Noted: 2019-07-09

## 2019-07-16 ENCOUNTER — TELEPHONE (OUTPATIENT)
Dept: GASTROENTEROLOGY | Age: 59
End: 2019-07-16

## 2019-09-09 ENCOUNTER — TELEPHONE (OUTPATIENT)
Dept: GASTROENTEROLOGY | Age: 59
End: 2019-09-09

## 2019-09-19 ENCOUNTER — INITIAL CONSULT (OUTPATIENT)
Dept: ONCOLOGY | Age: 59
End: 2019-09-19
Payer: MEDICAID

## 2019-09-19 ENCOUNTER — TELEPHONE (OUTPATIENT)
Dept: ONCOLOGY | Age: 59
End: 2019-09-19

## 2019-09-19 VITALS
HEIGHT: 68 IN | BODY MASS INDEX: 25.94 KG/M2 | WEIGHT: 171.2 LBS | DIASTOLIC BLOOD PRESSURE: 71 MMHG | TEMPERATURE: 97.9 F | SYSTOLIC BLOOD PRESSURE: 106 MMHG | HEART RATE: 63 BPM

## 2019-09-19 DIAGNOSIS — I27.20 PULMONARY HTN (HCC): ICD-10-CM

## 2019-09-19 DIAGNOSIS — F10.11 HISTORY OF ALCOHOL ABUSE: ICD-10-CM

## 2019-09-19 DIAGNOSIS — I48.0 PAF (PAROXYSMAL ATRIAL FIBRILLATION) (HCC): Primary | ICD-10-CM

## 2019-09-19 DIAGNOSIS — C34.90 MALIGNANT NEOPLASM OF LUNG, UNSPECIFIED LATERALITY, UNSPECIFIED PART OF LUNG (HCC): ICD-10-CM

## 2019-09-19 DIAGNOSIS — R91.8 LUNG MASS: ICD-10-CM

## 2019-09-19 PROCEDURE — 99245 OFF/OP CONSLTJ NEW/EST HI 55: CPT | Performed by: INTERNAL MEDICINE

## 2019-09-19 PROCEDURE — 99201 HC NEW PT, E/M LEVEL 1: CPT | Performed by: INTERNAL MEDICINE

## 2019-09-19 PROCEDURE — G8427 DOCREV CUR MEDS BY ELIG CLIN: HCPCS | Performed by: INTERNAL MEDICINE

## 2019-09-19 PROCEDURE — G8417 CALC BMI ABV UP PARAM F/U: HCPCS | Performed by: INTERNAL MEDICINE

## 2019-09-19 NOTE — LETTER
COPD (chronic obstructive pulmonary disease) ([facility] [address] Depression, GERD (gastroesophageal reflux disease), Hepatic encephalopathy (HCC), Hepatitis C, Pancreatitis, alcoholic, Pulmonary hypertension ([facility] [address] and Seizures ([facility] [address] PAST SURGICAL HISTORY: has a past surgical history that includes Cholecystectomy; Hysterectomy;  section; Colonoscopy (14); Upper gastrointestinal endoscopy (14); Upper gastrointestinal endoscopy (2015); Knee arthroscopy (Left, 2016); ERCP (N/A, 3/8/2018); Insertable Cardiac Monitor (2018); Upper gastrointestinal endoscopy (N/A, 2019); and Colonoscopy (N/A, 2019). CURRENT MEDICATIONS:  has a current medication list which includes the following prescription(s): na sulfate-k sulfate-mg sulf, lactulose, mirtazapine, vitamin d, bupropion, cartia xt, alendronate, calcium carbonate, vitamin b-12, magnesium oxide, quetiapine, naproxen, and ibuprofen. ALLERGIES:  is allergic to chantix [varenicline] and penicillins. FAMILY HISTORY: Negative for any hematological or oncological conditions. SOCIAL HISTORY:  reports that she quit smoking about 2 weeks ago. Her smoking use included cigarettes. She started smoking about 36 years ago. She has a 8.25 pack-year smoking history. She has never used smokeless tobacco. She reports that she drank alcohol. She reports that she does not use drugs. REVIEW OF SYSTEMS:   General: no fever or night sweats, Weight is stable. ENT: No double or blurred vision, no tinnitus or hearing problem, no dysphagia or sore throat   Respiratory: No chest pain, no shortness of breath, no cough or hemoptysis. Cardiovascular: Denies chest pain, PND or orthopnea. No L E swelling or palpitations. Gastrointestinal:    No nausea or vomiting, abdominal pain, diarrhea or constipation. Genitourinary: Denies dysuria, hematuria, frequency, urgency or incontinence. Neurological: Denies headaches, decreased LOC, no sensory or motor focal deficits. Musculoskeletal:  No arthralgia no back pain or joint swelling. Skin: There are no rashes or bleeding. Psychiatric:  No anxiety, no depression. Endocrine: no diabetes or thyroid disease. Hematologic: no bleeding , no adenopathy. PHYSICAL EXAM: Shows a well appearing 64y.o.-year-old female who is not in pain or distress. Vital Signs: Blood pressure 106/71, pulse 63, temperature 97.9 °F (36.6 °C), temperature source Oral, height 5' 8\" (1.727 m), weight 171 lb 3.2 oz (77.7 kg), last menstrual period 07/19/2006, not currently breastfeeding. HEENT: Normocephalic and atraumatic. Pupils are equal, round, reactive to light and accommodation. Extraocular muscles are intact. Neck: Showed no JVD, no carotid bruit . Lungs: Decreased air entry. Heart: Regular without any murmur. Abdomen: Soft, nontender. No hepatosplenomegaly. Extremities: Lower extremities show no edema, clubbing, or cyanosis. Breasts: Examination not done today. Neuro exam: intact cranial nerves bilaterally no motor or sensory deficit, gait is normal. Lymphatic: no adenopathy appreciated in the supraclavicular, axillary, cervical or inguinal area    REVIEW OF LABORATORY DATA:     REVIEW OF RADIOLOGICAL RESULTS:     IMPRESSION:   1. COPD  2. Abnormal findings on bronchoscopy  3. HX liver cirrhosis, alcohol  4. HX hep C, treated  5. Smoking addiction  6. Plan for PET    PLAN:   1. We reviewed her hospitalization and CT results. 2. I reviewed pathology showing abnormal but not definitive. 3. I discussed plan for PET for definitve results and I am putting in orders. 4. I encouraged her to continue with smoking cessation. 5. Return to review results. If you have questions, please do not hesitate to call me. I look forward to following Eulalio along with you.     Sincerely,    Brielle Srinivasan MD  Hematology/ Oncology  Cell [phone]

## 2019-09-19 NOTE — TELEPHONE ENCOUNTER
Pt to have PET-scheduled at [facility] on 9/27/19 at 1300-info faxed to [facility], confirmation rec'd-pt to return to see MD on 10/8/19

## 2019-09-27 ENCOUNTER — HOSPITAL ENCOUNTER (OUTPATIENT)
Dept: NUCLEAR MEDICINE | Age: 59
Discharge: HOME | End: 2019-09-29
Payer: MEDICAID

## 2019-09-27 DIAGNOSIS — I27.20 PULMONARY HTN (HCC): ICD-10-CM

## 2019-09-27 DIAGNOSIS — C34.90 MALIGNANT NEOPLASM OF LUNG, UNSPECIFIED LATERALITY, UNSPECIFIED PART OF LUNG (HCC): ICD-10-CM

## 2019-09-27 DIAGNOSIS — I48.0 PAF (PAROXYSMAL ATRIAL FIBRILLATION) (HCC): ICD-10-CM

## 2019-09-27 DIAGNOSIS — R91.8 LUNG MASS: ICD-10-CM

## 2019-09-27 DIAGNOSIS — F10.11 HISTORY OF ALCOHOL ABUSE: ICD-10-CM

## 2019-09-27 PROCEDURE — 78815 PET IMAGE W/CT SKULL-THIGH: CPT

## 2019-09-27 PROCEDURE — 3430000000 HC RX DIAGNOSTIC RADIOPHARMACEUTICAL: Performed by: INTERNAL MEDICINE

## 2019-09-27 PROCEDURE — A9552 F18 FDG: HCPCS | Performed by: INTERNAL MEDICINE

## 2019-09-27 RX ADMIN — FLUDEOXYGLUCOSE F 18 10.23 MILLICURIE: 200 INJECTION, SOLUTION INTRAVENOUS at 12:33

## 2019-09-28 RX ORDER — FLUDEOXYGLUCOSE F 18 200 MCI/ML
10.23 INJECTION, SOLUTION INTRAVENOUS
Status: COMPLETED | OUTPATIENT
Start: 2019-09-28 | End: 2019-09-27

## 2019-10-04 PROBLEM — D69.6 THROMBOCYTOPENIA (HCC): Status: ACTIVE | Noted: 2019-10-04

## 2019-10-08 ENCOUNTER — OFFICE VISIT (OUTPATIENT)
Dept: ONCOLOGY | Age: 59
End: 2019-10-08
Payer: MEDICAID

## 2019-10-08 ENCOUNTER — TELEPHONE (OUTPATIENT)
Dept: ONCOLOGY | Age: 59
End: 2019-10-08

## 2019-10-08 VITALS
BODY MASS INDEX: 26.12 KG/M2 | HEART RATE: 84 BPM | TEMPERATURE: 98.1 F | DIASTOLIC BLOOD PRESSURE: 70 MMHG | WEIGHT: 171.8 LBS | SYSTOLIC BLOOD PRESSURE: 114 MMHG | RESPIRATION RATE: 18 BRPM

## 2019-10-08 DIAGNOSIS — C34.90 MALIGNANT NEOPLASM OF LUNG, UNSPECIFIED LATERALITY, UNSPECIFIED PART OF LUNG (HCC): Primary | ICD-10-CM

## 2019-10-08 DIAGNOSIS — I48.0 PAF (PAROXYSMAL ATRIAL FIBRILLATION) (HCC): ICD-10-CM

## 2019-10-08 PROCEDURE — 3017F COLORECTAL CA SCREEN DOC REV: CPT | Performed by: INTERNAL MEDICINE

## 2019-10-08 PROCEDURE — 1036F TOBACCO NON-USER: CPT | Performed by: INTERNAL MEDICINE

## 2019-10-08 PROCEDURE — 99211 OFF/OP EST MAY X REQ PHY/QHP: CPT | Performed by: INTERNAL MEDICINE

## 2019-10-08 PROCEDURE — 99214 OFFICE O/P EST MOD 30 MIN: CPT | Performed by: INTERNAL MEDICINE

## 2019-10-08 PROCEDURE — G8417 CALC BMI ABV UP PARAM F/U: HCPCS | Performed by: INTERNAL MEDICINE

## 2019-10-08 PROCEDURE — G8427 DOCREV CUR MEDS BY ELIG CLIN: HCPCS | Performed by: INTERNAL MEDICINE

## 2019-10-08 PROCEDURE — G8484 FLU IMMUNIZE NO ADMIN: HCPCS | Performed by: INTERNAL MEDICINE

## 2019-11-14 ENCOUNTER — TELEPHONE (OUTPATIENT)
Dept: GASTROENTEROLOGY | Age: 59
End: 2019-11-14

## 2019-11-18 RX ORDER — LACTULOSE 10 G/15ML
SOLUTION ORAL
Qty: 3600 ML | Refills: 3 | Status: SHIPPED | OUTPATIENT
Start: 2019-11-18

## 2019-12-05 ENCOUNTER — TELEPHONE (OUTPATIENT)
Dept: GASTROENTEROLOGY | Age: 59
End: 2019-12-05

## 2020-02-07 ENCOUNTER — HOSPITAL ENCOUNTER (OUTPATIENT)
Dept: CT IMAGING | Age: 60
Discharge: HOME | End: 2020-02-09
Payer: MEDICAID

## 2020-02-07 PROCEDURE — 71260 CT THORAX DX C+: CPT

## 2020-02-07 PROCEDURE — 6360000004 HC RX CONTRAST MEDICATION: Performed by: INTERNAL MEDICINE

## 2020-02-07 PROCEDURE — 2580000003 HC RX 258: Performed by: INTERNAL MEDICINE

## 2020-02-07 RX ORDER — SODIUM CHLORIDE 0.9 % (FLUSH) 0.9 %
10 SYRINGE (ML) INJECTION PRN
Status: DISCONTINUED | OUTPATIENT
Start: 2020-02-07 | End: 2020-02-10 | Stop reason: HOSPADM

## 2020-02-07 RX ORDER — 0.9 % SODIUM CHLORIDE 0.9 %
80 INTRAVENOUS SOLUTION INTRAVENOUS ONCE
Status: COMPLETED | OUTPATIENT
Start: 2020-02-07 | End: 2020-02-07

## 2020-02-07 RX ADMIN — IOPAMIDOL 75 ML: 755 INJECTION, SOLUTION INTRAVENOUS at 13:58

## 2020-02-07 RX ADMIN — Medication 10 ML: at 13:58

## 2020-02-07 RX ADMIN — SODIUM CHLORIDE 80 ML: 9 INJECTION, SOLUTION INTRAVENOUS at 13:58

## 2020-02-10 PROBLEM — R06.09 DYSPNEA ON EXERTION: Status: ACTIVE | Noted: 2020-02-10

## 2020-02-11 ENCOUNTER — OFFICE VISIT (OUTPATIENT)
Dept: ONCOLOGY | Age: 60
End: 2020-02-11
Payer: MEDICAID

## 2020-02-11 ENCOUNTER — TELEPHONE (OUTPATIENT)
Dept: ONCOLOGY | Age: 60
End: 2020-02-11

## 2020-02-11 VITALS
HEART RATE: 76 BPM | DIASTOLIC BLOOD PRESSURE: 77 MMHG | BODY MASS INDEX: 26.81 KG/M2 | WEIGHT: 176.3 LBS | SYSTOLIC BLOOD PRESSURE: 121 MMHG | TEMPERATURE: 98.1 F

## 2020-02-11 PROCEDURE — 99214 OFFICE O/P EST MOD 30 MIN: CPT | Performed by: INTERNAL MEDICINE

## 2020-02-11 PROCEDURE — G8484 FLU IMMUNIZE NO ADMIN: HCPCS | Performed by: INTERNAL MEDICINE

## 2020-02-11 PROCEDURE — G8417 CALC BMI ABV UP PARAM F/U: HCPCS | Performed by: INTERNAL MEDICINE

## 2020-02-11 PROCEDURE — G8427 DOCREV CUR MEDS BY ELIG CLIN: HCPCS | Performed by: INTERNAL MEDICINE

## 2020-02-11 PROCEDURE — 1036F TOBACCO NON-USER: CPT | Performed by: INTERNAL MEDICINE

## 2020-02-11 PROCEDURE — 3017F COLORECTAL CA SCREEN DOC REV: CPT | Performed by: INTERNAL MEDICINE

## 2020-02-11 RX ORDER — ALBUTEROL SULFATE 90 UG/1
AEROSOL, METERED RESPIRATORY (INHALATION)
Status: ON HOLD | COMMUNITY
End: 2022-01-19

## 2020-02-11 NOTE — PROGRESS NOTES
DIAGNOSIS:   1. Advanced COPD  Bronchoscopy showed atypical squamous cells concerning but not diagnostic of squamous cell carcinoma  Imaging studies suggested pleural thickening but no definitive mass. 2. Liver cirrhosis, alcohol related  3. Hep C - treated    CURRENT THERAPY:  Surveillance    BRIEF CASE HISTORY: Jhon Garcia is a very pleasant 64 y.o. female who is referred to us for management recommendation for her abnormal bronchial cytology. She presented to the hospital with worsening shortness of breath. She has a history of COPD and liver cirrhosis. Imaging studies suggested of pleural thickening and pulmonary fibrosis. There was no definitive mass. Because of worsening symptoms, she underwent bronchoscopy and cytology was positive for atypical squamous cells suggestive of either atypia or dysplasia versus malignancy. At the same time, she has some rectal bleeding that was thought to be due to anticoagulation from her paroxysmal atrial fibrillation. The bleeding has stopped and her hemoglobin was stable. She was discharged to follow with us as an outpatient. She has some exertional dyspnea but improved, she uses oxygen as needed. She has history of alcoholism resulting in cirrhosis, she has been sober for 1 year. She has not smoked cigarettes following discharge. She has history of hep C that was treated and follows with Dr. Thee Deal. Work-up did not show any persistent lung mass and PET scan was negative so we decided surveillance. Quit smoking and his COPD get slowly better. INTERIM HISTORY:  The patient presents for follow up for lung cancer. She has quit smoking, her COPD symptoms persist and she is using her inhalers. She has no new or worsening symptoms. Her weight is stable.      PAST MEDICAL HISTORY: has a past medical history of Alcohol abuse, unspecified, Alcoholic cirrhosis of liver ([facility] [address] Anxiety, Arrhythmia, Asthma, Bipolar disorder ([facility] [address] Cirrhosis of liver ([facility] [address] headaches, decreased LOC, no sensory or motor focal deficits. Musculoskeletal:  No arthralgia no back pain or joint swelling. Skin: There are no rashes or bleeding. Psychiatric:  No anxiety, no depression. Endocrine: no diabetes or thyroid disease. Hematologic: no bleeding , no adenopathy. PHYSICAL EXAM: Shows a well appearing 64y.o.-year-old female who is not in pain or distress. Vital Signs: Blood pressure 121/77, pulse 76, temperature 98.1 °F (36.7 °C), temperature source Oral, weight 176 lb 4.8 oz (80 kg), last menstrual period 07/19/2006, not currently breastfeeding. HEENT: Normocephalic and atraumatic. Pupils are equal, round, reactive to light and accommodation. Extraocular muscles are intact. Neck: Showed no JVD, no carotid bruit . Lungs: Decreased air entry. Heart: Regular without any murmur. Abdomen: Soft, nontender. No hepatosplenomegaly. Extremities: Lower extremities show no edema, clubbing, or cyanosis. Breasts: Examination not done today. Neuro exam: intact cranial nerves bilaterally no motor or sensory deficit, gait is normal. Lymphatic: no adenopathy appreciated in the supraclavicular, axillary, cervical or inguinal area  +bruising    REVIEW OF LABORATORY DATA:     REVIEW OF RADIOLOGICAL RESULTS:   Impression   Negative PET-CT with no evidence of abnormal uptake to suggest malignancy. Few chronic findings as described.       RECOMMENDATIONS:   Recent outside CT examination is not available for comparison, if/when made   available an addendum will be issued. CT scan 2/2020  Impression   1.  Mild mediastinal adenopathy.       2.  Findings of pulmonary arterial hypertension.       3.  Mild emphysematous changes are present with stable inferior right upper   lobe opacities which may be related to inflammatory or infectious process. Lung appearance is unchanged. IMPRESSION:   1. COPD  2. Abnormal findings on bronchoscopy  3. HX liver cirrhosis, alcohol  4.  HX hep C,

## 2020-02-18 ENCOUNTER — TELEPHONE (OUTPATIENT)
Dept: GASTROENTEROLOGY | Age: 60
End: 2020-02-18

## 2020-03-26 ENCOUNTER — TELEPHONE (OUTPATIENT)
Dept: GASTROENTEROLOGY | Age: 60
End: 2020-03-26

## 2020-03-26 NOTE — TELEPHONE ENCOUNTER
Tried calling patient to advise that 4/1/20 has been cancelled due to covid-19 but primary number just rings, cell phone just beeps. Mailing letter.

## 2020-06-09 ENCOUNTER — TELEPHONE (OUTPATIENT)
Dept: GASTROENTEROLOGY | Age: 60
End: 2020-06-09

## 2020-08-26 ENCOUNTER — TELEPHONE (OUTPATIENT)
Dept: GASTROENTEROLOGY | Age: 60
End: 2020-08-26

## 2020-08-26 NOTE — TELEPHONE ENCOUNTER
Patient called and stated she also had an EGD. Writer stated call would be made to obtain records. Writer then called [employer], [phone] and spoke with Reji Santana who states she will fax all 3 over today.

## 2020-08-26 NOTE — TELEPHONE ENCOUNTER
EGD, CT, and MRI results are obtained. MRI suggests repeat in 3 months. Please schedule patient for next available appt.

## 2020-08-26 NOTE — TELEPHONE ENCOUNTER
Writer called and spoke with patient. She will call [facility] and have them send over her CT and MRI results. These results will be discussed with Dr Zane Gan. Results are needed to treat. Once results are received, appt will be made appropriately.

## 2020-08-26 NOTE — TELEPHONE ENCOUNTER
Patient left a message stating that she was seen at [place] [address] and had a CT that showed lesions on her liver. Please review and advise where to schedule the patient.

## 2020-08-27 NOTE — TELEPHONE ENCOUNTER
LVM for patient that we have scheduled her for 9/23/20 1 pm at the [facility] [place] office and if this does not work to call the office.

## 2020-09-23 ENCOUNTER — OFFICE VISIT (OUTPATIENT)
Dept: GASTROENTEROLOGY | Age: 60
End: 2020-09-23
Payer: MEDICAID

## 2020-09-23 VITALS
DIASTOLIC BLOOD PRESSURE: 66 MMHG | WEIGHT: 157 LBS | SYSTOLIC BLOOD PRESSURE: 113 MMHG | BODY MASS INDEX: 23.87 KG/M2 | RESPIRATION RATE: 18 BRPM

## 2020-09-23 PROCEDURE — G8420 CALC BMI NORM PARAMETERS: HCPCS | Performed by: INTERNAL MEDICINE

## 2020-09-23 PROCEDURE — G8427 DOCREV CUR MEDS BY ELIG CLIN: HCPCS | Performed by: INTERNAL MEDICINE

## 2020-09-23 PROCEDURE — 3017F COLORECTAL CA SCREEN DOC REV: CPT | Performed by: INTERNAL MEDICINE

## 2020-09-23 PROCEDURE — 99214 OFFICE O/P EST MOD 30 MIN: CPT | Performed by: INTERNAL MEDICINE

## 2020-09-23 PROCEDURE — 1036F TOBACCO NON-USER: CPT | Performed by: INTERNAL MEDICINE

## 2020-09-23 ASSESSMENT — ENCOUNTER SYMPTOMS
ANAL BLEEDING: 0
RESPIRATORY NEGATIVE: 1
NAUSEA: 0
DIARRHEA: 0
COUGH: 0
CHOKING: 0
CONSTIPATION: 1
EYES NEGATIVE: 1
ABDOMINAL DISTENTION: 0
RECTAL PAIN: 0
VOMITING: 0
ALLERGIC/IMMUNOLOGIC NEGATIVE: 1
WHEEZING: 0
TROUBLE SWALLOWING: 0
BLOOD IN STOOL: 0
ABDOMINAL PAIN: 1

## 2020-09-23 NOTE — PROGRESS NOTES
GI CLINIC FOLLOW UP    INTERVAL HISTORY:   No referring provider defined for this encounter. Chief Complaint   Patient presents with    Follow-up     follow up EGD/CT/MRI. Patient states having lower abdominal pain on occasion.  Constipation     Patient states using her lactulose and drinking water. Patient states being constipated 3-4 days.  Other     patient states that CT showed liver lesions. HISTORY OF PRESENT ILLNESS: Abdulaziz Romero is a 64 y.o. female , referred for evaluation of* liver cirrhosis , hep C , pancreatitis, irregular Z line, diverticulosis    Patient is a very well-known patient to me with hepatitis C which was treated and eradicated, she had alcoholic liver cirrhosis which was very significant and complicated by ascites and hepatic encephalopathy, splenomegaly etc.  She has TIPS for her recurrent ascites and after that she did very well  She is been very compliant now and she has been sober for over a year since then she is doing very well. She was recently admitted at [facility] she said for some uncontrolled A. fib, but GI saw her there because of her anemia seems like they scoped her she said upper and lower. CAT scan of the abdomen showed possible 1.3 cm lesion for which MRI was ordered MRI indicating that the lesions most likely are benign please refer to the below result. I have done imaging in the past on her and been negative and I have been checking alpha-fetoprotein which been normal.    Patient denied any weight loss denied any abdominal pain denied any nausea vomiting fever or chills today  In fact she is on the constipated side but other than that there is no other symptoms  No N/v   no abd pain   no melena/hematemsis/hematochezia  No dysphagia/odynophagia   no wt loss   no diarrhea     Past Medical,Family, and Social History reviewed and does contribute to the patient presentingcondition.     Patient's PMH/PSH,SH,PSYCH Hx, MEDs, ALLERGIES, and ROS were all reviewed and updated in the appropriate sections. PAST MEDICAL HISTORY:  Past Medical History:   Diagnosis Date    Alcohol abuse, unspecified     Alcoholic cirrhosis of liver (HCC)     Anxiety     Arrhythmia     Asthma     Bipolar disorder (HCC)     Cirrhosis of liver ([address]     Constipation     COPD (chronic obstructive pulmonary disease) (HCC)     Depression     GERD (gastroesophageal reflux disease)     Hepatic encephalopathy (HCC)     Hepatitis C     Pancreatitis, alcoholic     Pulmonary hypertension (HCC)     Seizures (HCC)     5 in the past related to alcohol withdrawal       Past Surgical History:   Procedure Laterality Date     SECTION      CHOLECYSTECTOMY      COLONOSCOPY  14    NORMAL    COLONOSCOPY N/A 2019    COLONOSCOPY DIAGNOSTIC performed by Keshia Kate MD at [address] [place] [address] ERCP N/A 3/8/2018    ERCP SPINCTER/PAPILLOTOMY; BALLOON SWEEP, STONE EXTRACTION & BIOPSY performed by Keshia Kate MD at [facility]  2018    [employer]    KNEE ARTHROSCOPY Left 2016    Arthroscopic Chondroplasty, Lateral meniscectomy    SHOULDER SURGERY Left 2019    UPPER GASTROINTESTINAL ENDOSCOPY  14    SCHATZKI RING, MILD CHRONIC GASTRITIS    UPPER GASTROINTESTINAL ENDOSCOPY  2015    MILD CHRONIC GASTRITIS    UPPER GASTROINTESTINAL ENDOSCOPY N/A 2019    EGD BIOPSY performed by Keshia Kate MD at [address]:    Current Outpatient Medications:     albuterol sulfate  (90 Base) MCG/ACT inhaler, every 4 (four) hours as needed (summertime). , Disp: , Rfl:     OXYGEN, Inhale into the lungs, Disp: , Rfl:     lactulose (CHRONULAC) 10 GM/15ML solution, take 30 milliliters by mouth four times a day, Disp: 3600 mL, Rfl: 3    Mirtazapine (REMERON PO), Take by mouth nightly, Disp: , Rfl:     Cholecalciferol (VITAMIN D) 2000 units CAPS capsule, Take 1 capsule by mouth daily, Disp: , Rfl:     CARTIA  MG extended release capsule, Take 120 mg by mouth daily, Disp: , Rfl: 0    alendronate (FOSAMAX) 70 MG tablet, Take 1 tablet by mouth every 7 days, Disp: 4 tablet, Rfl: 3    calcium carbonate 600 MG TABS tablet, Take 1 tablet by mouth daily, Disp: , Rfl:     vitamin B-12 (CYANOCOBALAMIN) 1000 MCG tablet, Take 1,000 mcg by mouth daily, Disp: , Rfl:     magnesium oxide (MAG-OX) 400 MG tablet, Take 400 mg by mouth daily 2 tabs twice a day , Disp: , Rfl:     QUEtiapine (SEROQUEL) 300 MG tablet, Take 1 tablet by mouth nightly., Disp: 30 tablet, Rfl: 0    naproxen (NAPROSYN) 500 MG tablet, Take 1 tablet by mouth 2 times daily (with meals) for 15 days, Disp: 30 tablet, Rfl: 0    ibuprofen (ADVIL;MOTRIN) 800 MG tablet, Take 1 tablet by mouth every 8 hours as needed for Pain, Disp: 90 tablet, Rfl: 0    ALLERGIES:   Allergies   Allergen Reactions    Chantix [Varenicline] Other (See Comments)     Bad dreams    Penicillins        FAMILY HISTORY:       Problem Relation Age of Onset    Cancer Father         Bladder    Cancer Brother          SOCIAL HISTORY:   Social History     Socioeconomic History    Marital status:      Spouse name: Not on file    Number of children: Not on file    Years of education: Not on file    Highest education level: Not on file   Occupational History    Not on file   Social Needs    Financial resource strain: Not on file    Food insecurity     Worry: Not on file     Inability: Not on file   [employer] needs     Medical: Not on file     Non-medical: Not on file   Tobacco Use    Smoking status: Former Smoker     Packs/day: 0.25     Years: 33.00     Pack years: 8.25     Types: Cigarettes     Start date: 1983     Last attempt to quit: 2019     Years since quittin.0    Smokeless tobacco: Never Used    Tobacco comment: about 3-4 cigs a day, trying to quit   Substance and Sexual Activity    Alcohol use: Not Currently     Alcohol/week: 0.0 standard drinks     Comment: 05/11/15 - rare     Drug use: No     Comment: none    Sexual activity: Not on file   Lifestyle    Physical activity     Days per week: Not on file     Minutes per session: Not on file    Stress: Not on file   Relationships    Social connections     Talks on phone: Not on file     Gets together: Not on file     Attends [employer] service: Not on file     Active member of club or organization: Not on file     Attends meetings of clubs or organizations: Not on file     Relationship status: Not on file    Intimate partner violence     Fear of current or ex partner: Not on file     Emotionally abused: Not on file     Physically abused: Not on file     Forced sexual activity: Not on file   Other Topics Concern    Not on file   Social History Narrative    Not on file       REVIEW OF SYSTEMS: A 12-point review of systemswas obtained and pertinent positives and negatives were enumerated above in the history of present illness. All other reviewed systems / symptoms were negative. Review of Systems   Constitutional: Positive for fatigue. Negative for appetite change and unexpected weight change. HENT: Negative. Negative for trouble swallowing. Eyes: Negative. Respiratory: Negative. Negative for cough, choking and wheezing. Cardiovascular: Negative. Negative for chest pain, palpitations and leg swelling. Gastrointestinal: Positive for abdominal pain (lower when constipated) and constipation. Negative for abdominal distention, anal bleeding, blood in stool, diarrhea, nausea, rectal pain and vomiting. Endocrine: Negative. Genitourinary: Negative. Negative for difficulty urinating. Musculoskeletal: Negative. Skin: Negative. Allergic/Immunologic: Negative. Negative for environmental allergies and food allergies. Neurological: Negative. Negative for dizziness, weakness, light-headedness, numbness and headaches. typographical errors. Please contact our office if you have any questions.       Mary Genao MD  [facility]  O: #[phone]

## 2021-01-08 LAB
A/G RATIO: ABNORMAL
ALBUMIN SERPL-MCNC: 4.4 G/DL
ALP BLD-CCNC: 321 U/L
ALT SERPL-CCNC: 43 U/L
AST SERPL-CCNC: 66 U/L
BILIRUB SERPL-MCNC: 3.8 MG/DL (ref 0.1–1.4)
BILIRUBIN DIRECT: ABNORMAL
BILIRUBIN, INDIRECT: ABNORMAL
GLOBULIN: ABNORMAL
PROTEIN TOTAL: 8 G/DL

## 2021-01-15 DIAGNOSIS — K70.30 ALCOHOLIC CIRRHOSIS OF LIVER WITHOUT ASCITES (HCC): ICD-10-CM

## 2021-04-09 ENCOUNTER — TELEPHONE (OUTPATIENT)
Dept: ONCOLOGY | Age: 61
End: 2021-04-09

## 2021-04-09 NOTE — TELEPHONE ENCOUNTER
Patient did not have ct scan done, left message asking patient if she would like to reschedule the ct scan. Dis leave the number to scheduling [phone]. DId let her know if she has questions to call the office.

## 2021-04-13 ENCOUNTER — TELEPHONE (OUTPATIENT)
Dept: ONCOLOGY | Age: 61
End: 2021-04-13

## 2021-04-13 ENCOUNTER — OFFICE VISIT (OUTPATIENT)
Dept: ONCOLOGY | Age: 61
End: 2021-04-13
Payer: MEDICAID

## 2021-04-13 ENCOUNTER — HOSPITAL ENCOUNTER (OUTPATIENT)
Age: 61
Discharge: HOME | End: 2021-04-13
Payer: MEDICAID

## 2021-04-13 VITALS
DIASTOLIC BLOOD PRESSURE: 72 MMHG | SYSTOLIC BLOOD PRESSURE: 111 MMHG | BODY MASS INDEX: 22.87 KG/M2 | WEIGHT: 150.4 LBS | TEMPERATURE: 97.5 F | HEART RATE: 76 BPM

## 2021-04-13 DIAGNOSIS — D69.6 THROMBOCYTOPENIA (HCC): ICD-10-CM

## 2021-04-13 LAB
ABSOLUTE EOS #: 0.1 K/UL (ref 0–0.4)
ABSOLUTE IMMATURE GRANULOCYTE: ABNORMAL K/UL (ref 0–0.3)
ABSOLUTE LYMPH #: 1.2 K/UL (ref 1–4.8)
ABSOLUTE MONO #: 0.4 K/UL (ref 0.1–1.2)
BASOPHILS # BLD: 1 % (ref 0–2)
BASOPHILS ABSOLUTE: 0 K/UL (ref 0–0.2)
DIFFERENTIAL TYPE: ABNORMAL
EOSINOPHILS RELATIVE PERCENT: 3 % (ref 1–4)
HCT VFR BLD CALC: 38.3 % (ref 36–46)
HEMOGLOBIN: 12.8 G/DL (ref 12–16)
IMMATURE GRANULOCYTES: ABNORMAL %
LYMPHOCYTES # BLD: 27 % (ref 24–44)
MCH RBC QN AUTO: 31.3 PG (ref 26–34)
MCHC RBC AUTO-ENTMCNC: 33.5 G/DL (ref 31–37)
MCV RBC AUTO: 93.3 FL (ref 80–100)
MONOCYTES # BLD: 9 % (ref 2–11)
NRBC AUTOMATED: ABNORMAL PER 100 WBC
PDW BLD-RTO: 15.5 % (ref 12.5–15.4)
PLATELET # BLD: 97 K/UL (ref 140–450)
PLATELET ESTIMATE: ABNORMAL
PMV BLD AUTO: 8.1 FL (ref 6–12)
RBC # BLD: 4.1 M/UL (ref 4–5.2)
RBC # BLD: ABNORMAL 10*6/UL
SEG NEUTROPHILS: 60 % (ref 36–66)
SEGMENTED NEUTROPHILS ABSOLUTE COUNT: 2.7 K/UL (ref 1.8–7.7)
WBC # BLD: 4.4 K/UL (ref 3.5–11)
WBC # BLD: ABNORMAL 10*3/UL

## 2021-04-13 PROCEDURE — 36415 COLL VENOUS BLD VENIPUNCTURE: CPT

## 2021-04-13 PROCEDURE — 3017F COLORECTAL CA SCREEN DOC REV: CPT | Performed by: INTERNAL MEDICINE

## 2021-04-13 PROCEDURE — 99214 OFFICE O/P EST MOD 30 MIN: CPT | Performed by: INTERNAL MEDICINE

## 2021-04-13 PROCEDURE — 85025 COMPLETE CBC W/AUTO DIFF WBC: CPT

## 2021-04-13 PROCEDURE — G8420 CALC BMI NORM PARAMETERS: HCPCS | Performed by: INTERNAL MEDICINE

## 2021-04-13 PROCEDURE — G8427 DOCREV CUR MEDS BY ELIG CLIN: HCPCS | Performed by: INTERNAL MEDICINE

## 2021-04-13 PROCEDURE — 99211 OFF/OP EST MAY X REQ PHY/QHP: CPT | Performed by: INTERNAL MEDICINE

## 2021-04-13 PROCEDURE — 1036F TOBACCO NON-USER: CPT | Performed by: INTERNAL MEDICINE

## 2021-04-13 RX ORDER — OXYCODONE HYDROCHLORIDE AND ACETAMINOPHEN 5; 325 MG/1; MG/1
1 TABLET ORAL EVERY 4 HOURS PRN
Status: ON HOLD | COMMUNITY
End: 2022-01-19

## 2021-04-13 RX ORDER — CEPHALEXIN 500 MG/1
500 CAPSULE ORAL 2 TIMES DAILY
COMMUNITY
Start: 2021-03-24 | End: 2021-04-23

## 2021-04-13 NOTE — TELEPHONE ENCOUNTER
AVS from 4/13/21    Needs CBC today, return in 6 months with CBC    RV scheduled 10/19/21 @ 2pm    Cbc drawn today    PT was given AVS and an appt schedule    Electronically signed by Annalise Anton on 4/13/2021 at 3:19 PM

## 2021-04-13 NOTE — PROGRESS NOTES
DIAGNOSIS:   1. Advanced COPD  Bronchoscopy showed atypical squamous cells concerning but not diagnostic of squamous cell carcinoma  Imaging studies suggested pleural thickening but no definitive mass. 2. Liver cirrhosis, alcohol related  3. Hep C - treated    CURRENT THERAPY:  Surveillance    BRIEF CASE HISTORY: Efraín Zheng is a very pleasant 63 y.o. female who is referred to us for management recommendation for her abnormal bronchial cytology. She presented to the hospital with worsening shortness of breath. She has a history of COPD and liver cirrhosis. Imaging studies suggested of pleural thickening and pulmonary fibrosis. There was no definitive mass. Because of worsening symptoms, she underwent bronchoscopy and cytology was positive for atypical squamous cells suggestive of either atypia or dysplasia versus malignancy. At the same time, she has some rectal bleeding that was thought to be due to anticoagulation from her paroxysmal atrial fibrillation. The bleeding has stopped and her hemoglobin was stable. She was discharged to follow with us as an outpatient. She has some exertional dyspnea but improved, she uses oxygen as needed. She has history of alcoholism resulting in cirrhosis, she has been sober for 1 year. She has not smoked cigarettes following discharge. She has history of hep C that was treated and follows with Dr. Florentin Barraza. Work-up did not show any persistent lung mass and PET scan was negative so we decided surveillance. Quit smoking and his COPD get slowly better. INTERIM HISTORY:  The patient presents for follow up for lab work. She underwent left knee replacement and developed infection, she had replacement removed, spacer put in place, treated for infection, and was discharged to rehab. She was sent home last week and has second replacement surgery planned for next week. She denies any bleeding or bruising. She remains sober.      PAST MEDICAL HISTORY: has a past medical history of Alcohol abuse, unspecified, Alcoholic cirrhosis of liver (HCC), Anxiety, Arrhythmia, Asthma, Bipolar disorder ([address] Cirrhosis of liver ([address] Constipation, COPD (chronic obstructive pulmonary disease) ([address] Depression, GERD (gastroesophageal reflux disease), Hepatic encephalopathy ([address] Hepatitis C, Pancreatitis, alcoholic, Pulmonary hypertension ([address] and Seizures ([address] PAST SURGICAL HISTORY: has a past surgical history that includes Cholecystectomy; Hysterectomy;  section; Colonoscopy (14); Upper gastrointestinal endoscopy (14); Upper gastrointestinal endoscopy (2015); Knee arthroscopy (Left, 2016); ERCP (N/A, 3/8/2018); Insertable Cardiac Monitor (2018); Upper gastrointestinal endoscopy (N/A, 2019); Colonoscopy (N/A, 2019); and shoulder surgery (Left, 2019). CURRENT MEDICATIONS:  has a current medication list which includes the following prescription(s): oxycodone-acetaminophen, cartia xt, albuterol sulfate hfa, OXYGEN, lactulose, vitamin d, alendronate, calcium carbonate, vitamin b-12, magnesium oxide, quetiapine, cephalexin, mirtazapine, naproxen, and ibuprofen. ALLERGIES:  is allergic to chantix [varenicline] and penicillins. FAMILY HISTORY: Negative for any hematological or oncological conditions. SOCIAL HISTORY:  reports that she quit smoking about 19 months ago. Her smoking use included cigarettes. She started smoking about 37 years ago. She has a 8.25 pack-year smoking history. She has never used smokeless tobacco. She reports previous alcohol use. She reports that she does not use drugs. REVIEW OF SYSTEMS:   General: no fever or night sweats, Weight is stable. ENT: No double or blurred vision, no tinnitus or hearing problem, no dysphagia or sore throat   Respiratory: No chest pain, no cough or hemoptysis. +shortness of breath  Cardiovascular: Denies chest pain, PND or orthopnea.  No L E swelling or palpitations. Gastrointestinal: No nausea or vomiting, abdominal pain, diarrhea or constipation. Genitourinary: Denies dysuria, hematuria, frequency, urgency or incontinence. Neurological: Denies headaches, decreased LOC, no sensory or motor focal deficits. Musculoskeletal:  No arthralgia no back pain or joint swelling. Skin: There are no rashes or bleeding. Psychiatric:  No anxiety, no depression. Endocrine: no diabetes or thyroid disease. Hematologic: no bleeding , no adenopathy. PHYSICAL EXAM: Shows a well appearing 63y.o.-year-old female who is not in pain or distress. Vital Signs: Blood pressure 111/72, pulse 76, temperature 97.5 °F (36.4 °C), temperature source Oral, weight 150 lb 6.4 oz (68.2 kg), last menstrual period 07/19/2006, not currently breastfeeding. HEENT: Normocephalic and atraumatic. Pupils are equal, round, reactive to light and accommodation. Extraocular muscles are intact. Neck: Showed no JVD, no carotid bruit . Lungs: Decreased air entry. Heart: Regular without any murmur. Abdomen: Soft, nontender. No hepatosplenomegaly. Extremities: Left leg, no evidence for infection. Lower extremities show no edema, clubbing, or cyanosis. Breasts: Examination not done today. Neuro exam: intact cranial nerves bilaterally no motor or sensory deficit, gait is normal. Lymphatic: no adenopathy appreciated in the supraclavicular, axillary, cervical or inguinal area    REVIEW OF LABORATORY DATA:   Lab Results   Component Value Date    WBC 3.2 (L) 06/06/2019    HGB 14.3 06/06/2019    HCT 42.1 06/06/2019    MCV 96.1 06/06/2019    PLT 77 (L) 06/06/2019       REVIEW OF RADIOLOGICAL RESULTS:     IMPRESSION:   1. COPD  2. Abnormal findings on bronchoscopy  3. HX liver cirrhosis, alcohol  4. HX hep C, treated  5. Smoking addiction - quit      PLAN:   1. We discussed her recent knee surgery and subsequent infection. 2. I discussed plan for updated lab work and am putting in orders.    3. We will plan for any intervention if needed based on the results in anticipation of surgery. 4. Return in 6 months.

## 2021-05-20 ENCOUNTER — NURSE TRIAGE (OUTPATIENT)
Dept: OTHER | Facility: CLINIC | Age: 61
End: 2021-05-20

## 2021-05-20 NOTE — TELEPHONE ENCOUNTER
Reason for Disposition   Patient sounds very sick or weak to the triager    Answer Assessment - Initial Assessment Questions  1. ONSET: \"When did the cough begin? \"       Cough for 4 days, pt reports coughing up bloody sputum, more today than before and filled four tissues with bloody clots. 2. SEVERITY: \"How bad is the cough today? \"     Pt reports that the cough seems better today than yesterday    3. RESPIRATORY DISTRESS: \"Describe your breathing. \"    breathing is okay - pt is on home O2. Pt reports that she has been told that she has lesions on her lungs about 8 months ago. 4. FEVER: \"Do you have a fever? \" If so, ask: \"What is your temperature, how was it measured, and when did it start? \"   fever    5. HEMOPTYSIS: \"Are you coughing up any blood? \" If so ask: \"How much? \" (flecks, streaks, tablespoons, etc.)  At least a tablespoon of blood each time she coughed. Covered the bottom of the emesis basin with a layer of bright red blood     6. TREATMENT: \"What have you done so far to treat the cough? \" (e.g., meds, fluids, humidifier)    Using cough drops and ice cubes    7. CARDIAC HISTORY: \"Do you have any history of heart disease? \" (e.g., heart attack, congestive heart failure)   A-fib    8. LUNG HISTORY: \"Do you have any history of lung disease? \"  (e.g., pulmonary embolus, asthma, emphysema)  Early COPD    5. PE RISK FACTORS: \"Do you have a history of blood clots? \" (or: recent major surgery, recent prolonged travel, bedridden)   had knee surgery on 12/16/20 and had infection follow-up surgery new knee on 4/16/21    10. OTHER SYMPTOMS: \"Do you have any other symptoms? (e.g., runny nose, wheezing, chest pain)   runny nose, has had a wheeze in the past week    11. PREGNANCY: \"Is there any chance you are pregnant? \" \"When was your last menstrual period? \"   none    12. TRAVEL: \"Have you traveled out of the country in the last month? \" (e.g., travel history, exposures)   none    Protocols used: COUGH-ADULT-[place]    Received call from Casey at pre-service center [facility] with [facility]. Pt became disconnected. RN called back to pt's cell # and left message. Pt reports coughing up bright red blood to cover the bottom of an emesis basin. Recommended pt to go to ED now. Pt states she will call cab and go to ED now. Care advice provided, patient verbalizes understanding; denies any other questions or concerns; instructed to call back for any new or worsening symptoms. Attention Provider: Thank you for allowing me to participate in the care of your patient. The patient was connected to triage in response to information provided to the [facility]. Please do not respond through this encounter as the response is not directed to a shared pool.

## 2021-05-20 NOTE — TELEPHONE ENCOUNTER
Reason for Disposition  Mariposa Vincent Caller has already spoken with another triager or PCP (or office), and has further questions and triager able to answer questions. Protocols used: NO CONTACT OR DUPLICATE CONTACT CALL-ADULT-[place]    Transferred caller back to triager. Was disconnected on first call.

## 2022-01-18 ENCOUNTER — HOSPITAL ENCOUNTER (INPATIENT)
Age: 62
LOS: 2 days | Discharge: OTHER INSTITUTION | DRG: 137 | End: 2022-01-22
Attending: STUDENT IN AN ORGANIZED HEALTH CARE EDUCATION/TRAINING PROGRAM | Admitting: INTERNAL MEDICINE
Payer: MEDICAID

## 2022-01-18 ENCOUNTER — APPOINTMENT (OUTPATIENT)
Dept: CT IMAGING | Age: 62
DRG: 137 | End: 2022-01-18
Payer: MEDICAID

## 2022-01-18 ENCOUNTER — APPOINTMENT (OUTPATIENT)
Dept: GENERAL RADIOLOGY | Age: 62
DRG: 137 | End: 2022-01-18
Payer: MEDICAID

## 2022-01-18 DIAGNOSIS — J18.9 PNEUMONIA DUE TO INFECTIOUS ORGANISM, UNSPECIFIED LATERALITY, UNSPECIFIED PART OF LUNG: ICD-10-CM

## 2022-01-18 DIAGNOSIS — G24.01 TARDIVE DYSKINESIA: ICD-10-CM

## 2022-01-18 DIAGNOSIS — R53.1 GENERALIZED WEAKNESS: Primary | ICD-10-CM

## 2022-01-18 DIAGNOSIS — U07.1 COVID-19: ICD-10-CM

## 2022-01-18 DIAGNOSIS — N39.0 URINARY TRACT INFECTION WITHOUT HEMATURIA, SITE UNSPECIFIED: ICD-10-CM

## 2022-01-18 LAB
ACETAMINOPHEN LEVEL: <5 MCG/ML (ref 10–30)
ALBUMIN SERPL-MCNC: 3.2 G/DL (ref 3.5–5.2)
ALP BLD-CCNC: 201 U/L (ref 35–104)
ALT SERPL-CCNC: 20 U/L (ref 0–32)
AMPHETAMINE SCREEN, URINE: NOT DETECTED
ANION GAP SERPL CALCULATED.3IONS-SCNC: 9 MMOL/L (ref 7–16)
AST SERPL-CCNC: 36 U/L (ref 0–31)
BACTERIA: ABNORMAL /HPF
BARBITURATE SCREEN URINE: NOT DETECTED
BASOPHILS ABSOLUTE: 0.03 E9/L (ref 0–0.2)
BASOPHILS RELATIVE PERCENT: 0.6 % (ref 0–2)
BENZODIAZEPINE SCREEN, URINE: POSITIVE
BILIRUB SERPL-MCNC: 1.6 MG/DL (ref 0–1.2)
BILIRUBIN URINE: NEGATIVE
BLOOD, URINE: NEGATIVE
BUN BLDV-MCNC: 13 MG/DL (ref 6–23)
CALCIUM SERPL-MCNC: 8.9 MG/DL (ref 8.6–10.2)
CANNABINOID SCREEN URINE: NOT DETECTED
CHLORIDE BLD-SCNC: 114 MMOL/L (ref 98–107)
CLARITY: CLEAR
CO2: 21 MMOL/L (ref 22–29)
COCAINE METABOLITE SCREEN URINE: NOT DETECTED
COLOR: YELLOW
CREAT SERPL-MCNC: 0.7 MG/DL (ref 0.5–1)
CRYSTALS, UA: ABNORMAL /HPF
EKG ATRIAL RATE: 90 BPM
EKG Q-T INTERVAL: 370 MS
EKG QRS DURATION: 92 MS
EKG QTC CALCULATION (BAZETT): 452 MS
EKG R AXIS: -168 DEGREES
EKG T AXIS: 67 DEGREES
EKG VENTRICULAR RATE: 90 BPM
EOSINOPHILS ABSOLUTE: 0.15 E9/L (ref 0.05–0.5)
EOSINOPHILS RELATIVE PERCENT: 2.8 % (ref 0–6)
EPITHELIAL CELLS, UA: ABNORMAL /HPF
ETHANOL: <10 MG/DL (ref 0–0.08)
FENTANYL SCREEN, URINE: NOT DETECTED
GFR AFRICAN AMERICAN: >60
GFR NON-AFRICAN AMERICAN: >60 ML/MIN/1.73
GLUCOSE BLD-MCNC: 102 MG/DL (ref 74–99)
GLUCOSE URINE: NEGATIVE MG/DL
HCT VFR BLD CALC: 37.6 % (ref 34–48)
HEMOGLOBIN: 12.2 G/DL (ref 11.5–15.5)
IMMATURE GRANULOCYTES #: 0.02 E9/L
IMMATURE GRANULOCYTES %: 0.4 % (ref 0–5)
INFLUENZA A: NOT DETECTED
INFLUENZA B: NOT DETECTED
KETONES, URINE: NEGATIVE MG/DL
LACTIC ACID: 2 MMOL/L (ref 0.5–2.2)
LEUKOCYTE ESTERASE, URINE: ABNORMAL
LIPASE: 48 U/L (ref 13–60)
LYMPHOCYTES ABSOLUTE: 1.03 E9/L (ref 1.5–4)
LYMPHOCYTES RELATIVE PERCENT: 19.5 % (ref 20–42)
Lab: ABNORMAL
MCH RBC QN AUTO: 31.2 PG (ref 26–35)
MCHC RBC AUTO-ENTMCNC: 32.4 % (ref 32–34.5)
MCV RBC AUTO: 96.2 FL (ref 80–99.9)
METHADONE SCREEN, URINE: NOT DETECTED
MONOCYTES ABSOLUTE: 0.61 E9/L (ref 0.1–0.95)
MONOCYTES RELATIVE PERCENT: 11.6 % (ref 2–12)
NEUTROPHILS ABSOLUTE: 3.43 E9/L (ref 1.8–7.3)
NEUTROPHILS RELATIVE PERCENT: 65.1 % (ref 43–80)
NITRITE, URINE: NEGATIVE
OPIATE SCREEN URINE: NOT DETECTED
OXYCODONE URINE: NOT DETECTED
PDW BLD-RTO: 16.1 FL (ref 11.5–15)
PH UA: 6 (ref 5–9)
PHENCYCLIDINE SCREEN URINE: NOT DETECTED
PLATELET # BLD: 101 E9/L (ref 130–450)
PMV BLD AUTO: 11.2 FL (ref 7–12)
POTASSIUM SERPL-SCNC: 3.9 MMOL/L (ref 3.5–5)
PROTEIN UA: NEGATIVE MG/DL
RBC # BLD: 3.91 E12/L (ref 3.5–5.5)
RBC UA: ABNORMAL /HPF (ref 0–2)
SALICYLATE, SERUM: <0.3 MG/DL (ref 0–30)
SARS-COV-2 RNA, RT PCR: DETECTED
SODIUM BLD-SCNC: 144 MMOL/L (ref 132–146)
SPECIFIC GRAVITY UA: 1.02 (ref 1–1.03)
TOTAL PROTEIN: 6.1 G/DL (ref 6.4–8.3)
TRICYCLIC ANTIDEPRESSANTS SCREEN SERUM: NEGATIVE NG/ML
TROPONIN, HIGH SENSITIVITY: 7 NG/L (ref 0–9)
TROPONIN, HIGH SENSITIVITY: 8 NG/L (ref 0–9)
UROBILINOGEN, URINE: 2 E.U./DL
WBC # BLD: 5.3 E9/L (ref 4.5–11.5)
WBC UA: ABNORMAL /HPF (ref 0–5)

## 2022-01-18 PROCEDURE — 80053 COMPREHEN METABOLIC PANEL: CPT

## 2022-01-18 PROCEDURE — 71046 X-RAY EXAM CHEST 2 VIEWS: CPT

## 2022-01-18 PROCEDURE — 83690 ASSAY OF LIPASE: CPT

## 2022-01-18 PROCEDURE — 80179 DRUG ASSAY SALICYLATE: CPT

## 2022-01-18 PROCEDURE — 70450 CT HEAD/BRAIN W/O DYE: CPT

## 2022-01-18 PROCEDURE — 82077 ASSAY SPEC XCP UR&BREATH IA: CPT

## 2022-01-18 PROCEDURE — 96372 THER/PROPH/DIAG INJ SC/IM: CPT

## 2022-01-18 PROCEDURE — 84484 ASSAY OF TROPONIN QUANT: CPT

## 2022-01-18 PROCEDURE — 80143 DRUG ASSAY ACETAMINOPHEN: CPT

## 2022-01-18 PROCEDURE — G0378 HOSPITAL OBSERVATION PER HR: HCPCS

## 2022-01-18 PROCEDURE — 83605 ASSAY OF LACTIC ACID: CPT

## 2022-01-18 PROCEDURE — 81001 URINALYSIS AUTO W/SCOPE: CPT

## 2022-01-18 PROCEDURE — 93005 ELECTROCARDIOGRAM TRACING: CPT | Performed by: PHYSICIAN ASSISTANT

## 2022-01-18 PROCEDURE — 87636 SARSCOV2 & INF A&B AMP PRB: CPT

## 2022-01-18 PROCEDURE — 80307 DRUG TEST PRSMV CHEM ANLYZR: CPT

## 2022-01-18 PROCEDURE — 93010 ELECTROCARDIOGRAM REPORT: CPT | Performed by: INTERNAL MEDICINE

## 2022-01-18 PROCEDURE — 6360000002 HC RX W HCPCS: Performed by: STUDENT IN AN ORGANIZED HEALTH CARE EDUCATION/TRAINING PROGRAM

## 2022-01-18 PROCEDURE — 99284 EMERGENCY DEPT VISIT MOD MDM: CPT

## 2022-01-18 PROCEDURE — 85025 COMPLETE CBC W/AUTO DIFF WBC: CPT

## 2022-01-18 RX ORDER — NITROFURANTOIN 25; 75 MG/1; MG/1
100 CAPSULE ORAL 2 TIMES DAILY
Qty: 20 CAPSULE | Refills: 0 | Status: SHIPPED | OUTPATIENT
Start: 2022-01-18 | End: 2022-01-20 | Stop reason: HOSPADM

## 2022-01-18 RX ORDER — DIPHENHYDRAMINE HYDROCHLORIDE 50 MG/ML
50 INJECTION INTRAMUSCULAR; INTRAVENOUS ONCE
Status: COMPLETED | OUTPATIENT
Start: 2022-01-18 | End: 2022-01-18

## 2022-01-18 RX ADMIN — DIPHENHYDRAMINE HYDROCHLORIDE 50 MG: 50 INJECTION, SOLUTION INTRAMUSCULAR; INTRAVENOUS at 23:42

## 2022-01-18 ASSESSMENT — ENCOUNTER SYMPTOMS
COUGH: 0
ABDOMINAL PAIN: 0
SHORTNESS OF BREATH: 0
VOMITING: 0
STRIDOR: 0
BACK PAIN: 0
ABDOMINAL DISTENTION: 0
DIARRHEA: 0
WHEEZING: 0
COLOR CHANGE: 0
CONSTIPATION: 0
NAUSEA: 0

## 2022-01-18 NOTE — ED NOTES
Department of Emergency Medicine  FIRST PROVIDER TRIAGE NOTE             Independent MLP           1/18/22  11:00 AM EST    Date of Encounter: 1/18/22   MRN: [id number]      HPI: Antoinette Mackenzie is a 63 y.o. female who presents to the ED for Fatigue (sent from  recovery [place] for fatigue x 3 days, possible withdrawal)     Patient states that she had COVID 5 days ago and extremely fatigued and lethargic. ROS: Negative for cp, sob or abd pain. PE: Gen Appearance/Constitutional: alert  HEENT: NC/NT. PERRLA,  Airway patent. Neck: supple     Initial Plan of Care: All treatment areas with department are currently occupied. Plan to order/Initiate the following while awaiting opening in ED: labs, EKG and imaging studies.     Initial Plan of Care: Initiate Treatment-Testing, Proceed toTreatment Area When Bed Available for ED Attending/MLP to Continue Care    Electronically signed by Charles Regalado PA-C   DD: 1/18/22         Charles Regalado PA-C  01/18/22 1100

## 2022-01-18 NOTE — ED NOTES
Spoke to Juanjose at addiction/recovery center #[phone]. She states that the facility is only for patients that are going through rehab and if they have any other medical conditions they cannot take care of the patient. Her concern is that she needs to get the patient transferred elsewhere.   Therefore made call to social work and patient placed in chairs for evaluation on michael Cooley PA-C  01/18/22 [address] [facility]

## 2022-01-18 NOTE — ED NOTES
Spoke to the Pt regarding why the pt was sent in. She stated that she has not had a drink in 21 days. She was admitted from [facility] on Friday. Per [facility]- at [facility]- where she came from-  the pt has been decompensating over the last day. They thought maybe she had UTI- urinating on self, was disoriented, laid in peoples beds and was confused. They have her things at their facility and they stated that they can keep her things for 30 days [phone]. They stated that they feared that she would get her meds and and due to confused state would take them. They stated that they would bring her things at anytime. They stated she cannot care for herself. The pt stated that she would like to return to [facility] when she is medically stable.         Carmina Evans, [facility]  01/18/22 [address] [facility],[address], [facility]  01/18/22 Maria Isabel Duggan

## 2022-01-19 ENCOUNTER — APPOINTMENT (OUTPATIENT)
Dept: CT IMAGING | Age: 62
DRG: 137 | End: 2022-01-19
Payer: MEDICAID

## 2022-01-19 LAB
C-REACTIVE PROTEIN: 0.5 MG/DL (ref 0–0.4)
CHOLESTEROL, TOTAL: 138 MG/DL (ref 0–199)
FERRITIN: 88 NG/ML
HBA1C MFR BLD: 4.3 % (ref 4–5.6)
HDLC SERPL-MCNC: 65 MG/DL
INR BLD: 1.3
LACTATE DEHYDROGENASE: 293 U/L (ref 135–214)
LDL CHOLESTEROL CALCULATED: 55 MG/DL (ref 0–99)
MAGNESIUM: 1.7 MG/DL (ref 1.6–2.6)
METER GLUCOSE: 104 MG/DL (ref 74–99)
PHOSPHORUS: 3.7 MG/DL (ref 2.5–4.5)
PROCALCITONIN: 0.08 NG/ML (ref 0–0.08)
PROTHROMBIN TIME: 14.3 SEC (ref 9.3–12.4)
TRIGL SERPL-MCNC: 88 MG/DL (ref 0–149)
TSH SERPL DL<=0.05 MIU/L-ACNC: 1.83 UIU/ML (ref 0.27–4.2)
VLDLC SERPL CALC-MCNC: 18 MG/DL

## 2022-01-19 PROCEDURE — 84145 PROCALCITONIN (PCT): CPT

## 2022-01-19 PROCEDURE — 6360000002 HC RX W HCPCS: Performed by: FAMILY MEDICINE

## 2022-01-19 PROCEDURE — 80061 LIPID PANEL: CPT

## 2022-01-19 PROCEDURE — 93005 ELECTROCARDIOGRAM TRACING: CPT | Performed by: STUDENT IN AN ORGANIZED HEALTH CARE EDUCATION/TRAINING PROGRAM

## 2022-01-19 PROCEDURE — 96365 THER/PROPH/DIAG IV INF INIT: CPT

## 2022-01-19 PROCEDURE — G0378 HOSPITAL OBSERVATION PER HR: HCPCS

## 2022-01-19 PROCEDURE — 2580000003 HC RX 258: Performed by: FAMILY MEDICINE

## 2022-01-19 PROCEDURE — 82962 GLUCOSE BLOOD TEST: CPT

## 2022-01-19 PROCEDURE — 86140 C-REACTIVE PROTEIN: CPT

## 2022-01-19 PROCEDURE — 84443 ASSAY THYROID STIM HORMONE: CPT

## 2022-01-19 PROCEDURE — 82728 ASSAY OF FERRITIN: CPT

## 2022-01-19 PROCEDURE — 6360000002 HC RX W HCPCS: Performed by: INTERNAL MEDICINE

## 2022-01-19 PROCEDURE — 83036 HEMOGLOBIN GLYCOSYLATED A1C: CPT

## 2022-01-19 PROCEDURE — 83735 ASSAY OF MAGNESIUM: CPT

## 2022-01-19 PROCEDURE — 96366 THER/PROPH/DIAG IV INF ADDON: CPT

## 2022-01-19 PROCEDURE — 2580000003 HC RX 258: Performed by: INTERNAL MEDICINE

## 2022-01-19 PROCEDURE — 2500000003 HC RX 250 WO HCPCS: Performed by: STUDENT IN AN ORGANIZED HEALTH CARE EDUCATION/TRAINING PROGRAM

## 2022-01-19 PROCEDURE — 36415 COLL VENOUS BLD VENIPUNCTURE: CPT

## 2022-01-19 PROCEDURE — 71260 CT THORAX DX C+: CPT

## 2022-01-19 PROCEDURE — 93005 ELECTROCARDIOGRAM TRACING: CPT | Performed by: INTERNAL MEDICINE

## 2022-01-19 PROCEDURE — 85610 PROTHROMBIN TIME: CPT

## 2022-01-19 PROCEDURE — 6360000002 HC RX W HCPCS

## 2022-01-19 PROCEDURE — 83615 LACTATE (LD) (LDH) ENZYME: CPT

## 2022-01-19 PROCEDURE — 6370000000 HC RX 637 (ALT 250 FOR IP): Performed by: FAMILY MEDICINE

## 2022-01-19 PROCEDURE — 6360000004 HC RX CONTRAST MEDICATION: Performed by: RADIOLOGY

## 2022-01-19 PROCEDURE — 84100 ASSAY OF PHOSPHORUS: CPT

## 2022-01-19 PROCEDURE — 96372 THER/PROPH/DIAG INJ SC/IM: CPT

## 2022-01-19 PROCEDURE — 6370000000 HC RX 637 (ALT 250 FOR IP): Performed by: INTERNAL MEDICINE

## 2022-01-19 PROCEDURE — 99291 CRITICAL CARE FIRST HOUR: CPT | Performed by: INTERNAL MEDICINE

## 2022-01-19 PROCEDURE — 96375 TX/PRO/DX INJ NEW DRUG ADDON: CPT

## 2022-01-19 RX ORDER — SODIUM CHLORIDE 0.9 % (FLUSH) 0.9 %
5-40 SYRINGE (ML) INJECTION EVERY 12 HOURS SCHEDULED
Status: DISCONTINUED | OUTPATIENT
Start: 2022-01-19 | End: 2022-01-22 | Stop reason: HOSPADM

## 2022-01-19 RX ORDER — QUETIAPINE FUMARATE 300 MG/1
300 TABLET, FILM COATED ORAL NIGHTLY
Status: DISCONTINUED | OUTPATIENT
Start: 2022-01-19 | End: 2022-01-22 | Stop reason: HOSPADM

## 2022-01-19 RX ORDER — DILTIAZEM HYDROCHLORIDE 5 MG/ML
10 INJECTION INTRAVENOUS ONCE
Status: COMPLETED | OUTPATIENT
Start: 2022-01-19 | End: 2022-01-19

## 2022-01-19 RX ORDER — TRAZODONE HYDROCHLORIDE 50 MG/1
50 TABLET ORAL NIGHTLY
Status: ON HOLD | COMMUNITY
End: 2022-01-21 | Stop reason: HOSPADM

## 2022-01-19 RX ORDER — DILTIAZEM HYDROCHLORIDE 120 MG/1
120 CAPSULE, COATED, EXTENDED RELEASE ORAL DAILY
Status: DISCONTINUED | OUTPATIENT
Start: 2022-01-19 | End: 2022-01-21

## 2022-01-19 RX ORDER — LANOLIN ALCOHOL/MO/W.PET/CERES
1000 CREAM (GRAM) TOPICAL DAILY
Status: DISCONTINUED | OUTPATIENT
Start: 2022-01-19 | End: 2022-01-22 | Stop reason: HOSPADM

## 2022-01-19 RX ORDER — ACETAMINOPHEN 650 MG/1
650 SUPPOSITORY RECTAL EVERY 6 HOURS PRN
Status: DISCONTINUED | OUTPATIENT
Start: 2022-01-19 | End: 2022-01-22 | Stop reason: HOSPADM

## 2022-01-19 RX ORDER — ASCORBIC ACID 500 MG
500 TABLET ORAL DAILY
Status: DISCONTINUED | OUTPATIENT
Start: 2022-01-19 | End: 2022-01-22 | Stop reason: HOSPADM

## 2022-01-19 RX ORDER — ONDANSETRON 2 MG/ML
4 INJECTION INTRAMUSCULAR; INTRAVENOUS EVERY 6 HOURS PRN
Status: DISCONTINUED | OUTPATIENT
Start: 2022-01-19 | End: 2022-01-22 | Stop reason: HOSPADM

## 2022-01-19 RX ORDER — CALCIUM CARBONATE 200(500)MG
500 TABLET,CHEWABLE ORAL DAILY
Status: DISCONTINUED | OUTPATIENT
Start: 2022-01-19 | End: 2022-01-22 | Stop reason: HOSPADM

## 2022-01-19 RX ORDER — ACETAMINOPHEN 325 MG/1
650 TABLET ORAL EVERY 6 HOURS PRN
Status: DISCONTINUED | OUTPATIENT
Start: 2022-01-19 | End: 2022-01-22 | Stop reason: HOSPADM

## 2022-01-19 RX ORDER — DILTIAZEM HYDROCHLORIDE 5 MG/ML
10 INJECTION INTRAVENOUS ONCE
Status: DISCONTINUED | OUTPATIENT
Start: 2022-01-19 | End: 2022-01-19

## 2022-01-19 RX ORDER — SODIUM CHLORIDE 0.9 % (FLUSH) 0.9 %
5-40 SYRINGE (ML) INJECTION PRN
Status: DISCONTINUED | OUTPATIENT
Start: 2022-01-19 | End: 2022-01-22 | Stop reason: HOSPADM

## 2022-01-19 RX ORDER — SODIUM CHLORIDE 9 MG/ML
25 INJECTION, SOLUTION INTRAVENOUS PRN
Status: DISCONTINUED | OUTPATIENT
Start: 2022-01-19 | End: 2022-01-22 | Stop reason: HOSPADM

## 2022-01-19 RX ORDER — ZINC SULFATE 50(220)MG
50 CAPSULE ORAL DAILY
Status: DISCONTINUED | OUTPATIENT
Start: 2022-01-19 | End: 2022-01-22 | Stop reason: HOSPADM

## 2022-01-19 RX ORDER — DILTIAZEM HYDROCHLORIDE 120 MG/1
120 CAPSULE, COATED, EXTENDED RELEASE ORAL DAILY
Status: DISCONTINUED | OUTPATIENT
Start: 2022-01-19 | End: 2022-01-19

## 2022-01-19 RX ORDER — ADENOSINE 3 MG/ML
INJECTION, SOLUTION INTRAVENOUS
Status: COMPLETED
Start: 2022-01-19 | End: 2022-01-19

## 2022-01-19 RX ORDER — GUAIFENESIN/DEXTROMETHORPHAN 100-10MG/5
5 SYRUP ORAL EVERY 4 HOURS PRN
Status: DISCONTINUED | OUTPATIENT
Start: 2022-01-19 | End: 2022-01-22 | Stop reason: HOSPADM

## 2022-01-19 RX ORDER — SODIUM CHLORIDE 9 MG/ML
INJECTION, SOLUTION INTRAVENOUS ONCE
Status: COMPLETED | OUTPATIENT
Start: 2022-01-19 | End: 2022-01-19

## 2022-01-19 RX ORDER — ONDANSETRON 4 MG/1
4 TABLET, ORALLY DISINTEGRATING ORAL EVERY 8 HOURS PRN
Status: DISCONTINUED | OUTPATIENT
Start: 2022-01-19 | End: 2022-01-22 | Stop reason: HOSPADM

## 2022-01-19 RX ORDER — POLYETHYLENE GLYCOL 3350 17 G/17G
17 POWDER, FOR SOLUTION ORAL DAILY PRN
Status: DISCONTINUED | OUTPATIENT
Start: 2022-01-19 | End: 2022-01-22 | Stop reason: HOSPADM

## 2022-01-19 RX ORDER — ADENOSINE 3 MG/ML
INJECTION, SOLUTION INTRAVENOUS
Status: DISCONTINUED
Start: 2022-01-19 | End: 2022-01-19 | Stop reason: WASHOUT

## 2022-01-19 RX ORDER — POTASSIUM CHLORIDE 20 MEQ/1
40 TABLET, EXTENDED RELEASE ORAL ONCE
Status: COMPLETED | OUTPATIENT
Start: 2022-01-19 | End: 2022-01-19

## 2022-01-19 RX ORDER — DIGOXIN 0.25 MG/ML
125 INJECTION INTRAMUSCULAR; INTRAVENOUS ONCE
Status: DISCONTINUED | OUTPATIENT
Start: 2022-01-19 | End: 2022-01-19

## 2022-01-19 RX ORDER — MAGNESIUM SULFATE IN WATER 40 MG/ML
2000 INJECTION, SOLUTION INTRAVENOUS ONCE
Status: COMPLETED | OUTPATIENT
Start: 2022-01-19 | End: 2022-01-19

## 2022-01-19 RX ORDER — OXYCODONE HYDROCHLORIDE AND ACETAMINOPHEN 5; 325 MG/1; MG/1
1 TABLET ORAL EVERY 4 HOURS PRN
Status: DISCONTINUED | OUTPATIENT
Start: 2022-01-19 | End: 2022-01-22 | Stop reason: HOSPADM

## 2022-01-19 RX ORDER — CHOLECALCIFEROL (VITAMIN D3) 50 MCG
2000 TABLET ORAL DAILY
Status: DISCONTINUED | OUTPATIENT
Start: 2022-01-19 | End: 2022-01-22 | Stop reason: HOSPADM

## 2022-01-19 RX ADMIN — Medication 10 ML: at 23:56

## 2022-01-19 RX ADMIN — IOPAMIDOL 90 ML: 755 INJECTION, SOLUTION INTRAVENOUS at 01:28

## 2022-01-19 RX ADMIN — ZINC SULFATE 220 MG (50 MG) CAPSULE 50 MG: CAPSULE at 13:37

## 2022-01-19 RX ADMIN — CALCIUM CARBONATE 500 MG: 500 TABLET, CHEWABLE ORAL at 13:37

## 2022-01-19 RX ADMIN — DILTIAZEM HYDROCHLORIDE 120 MG: 120 CAPSULE, COATED, EXTENDED RELEASE ORAL at 13:37

## 2022-01-19 RX ADMIN — OXYCODONE HYDROCHLORIDE AND ACETAMINOPHEN 500 MG: 500 TABLET ORAL at 13:37

## 2022-01-19 RX ADMIN — ADENOSINE: 3 INJECTION INTRAVENOUS at 13:22

## 2022-01-19 RX ADMIN — Medication 2000 UNITS: at 13:37

## 2022-01-19 RX ADMIN — ENOXAPARIN SODIUM 30 MG: 100 INJECTION SUBCUTANEOUS at 13:39

## 2022-01-19 RX ADMIN — Medication 10 ML: at 13:38

## 2022-01-19 RX ADMIN — ENOXAPARIN SODIUM 30 MG: 100 INJECTION SUBCUTANEOUS at 21:01

## 2022-01-19 RX ADMIN — DILTIAZEM HYDROCHLORIDE 10 MG: 5 INJECTION INTRAVENOUS at 11:56

## 2022-01-19 RX ADMIN — POTASSIUM CHLORIDE 40 MEQ: 1500 TABLET, EXTENDED RELEASE ORAL at 13:37

## 2022-01-19 RX ADMIN — SODIUM CHLORIDE: 9 INJECTION, SOLUTION INTRAVENOUS at 11:57

## 2022-01-19 RX ADMIN — QUETIAPINE FUMARATE 300 MG: 300 TABLET ORAL at 21:01

## 2022-01-19 RX ADMIN — MAGNESIUM SULFATE HEPTAHYDRATE 2000 MG: 40 INJECTION, SOLUTION INTRAVENOUS at 13:39

## 2022-01-19 ASSESSMENT — PAIN SCALES - GENERAL
PAINLEVEL_OUTOF10: 0
PAINLEVEL_OUTOF10: 10

## 2022-01-19 NOTE — CONSULTS
INPATIENT CARDIOLOGY CONSULT    Name: Cain Jimenez    Age: 63 y.o. Date of Admission: 1/18/2022  4:40 PM    Date of Service: 1/19/2022    Reason for Consultation: AF RVR    Referring Physician: Ruddy Gee DO    Primary Cardiologist: Joseph Peñaloza MD in [place], [place]    History of Present Illness:   Cain Jimenez is a 63 y.o. female (follows at [facility] in [place]) who presented on 1/18/2022 from a recovery center with confusion. She has a history of paroxysmal atrial fibrillation with low burden on loop recorder. Has not been anticoagulated due to low platelets related to history of cirrhosis with prior TIPS. Has history of alcohol abuse as well and pulmonary hypertension. Found to be COVID-19 positive. Was sinus on presentation that went into SVT this morning. I am consulted for this reason. Telemetry shows abrupt onset of a regular narrow complex tachycardia without descernible flutter or P waves rates about 140-150. Was given 10 mg IV diltiazem bolus. Infusion was ordered but not yet started and IV digoxin ordered but not given. States she was at rehab for alcohol after recent relapse. Was sent to ER for confusion. Currently notes palpitations and feels heart out of rhythm. Feels some chest discomfort and SOB. BP was soft 10O systolic. Review of Systems:   Complete review of systems negative except as described above.     Past Medical History:  Past Medical History:   Diagnosis Date    Alcohol abuse, unspecified     Alcoholic cirrhosis of liver (HCC)     Anxiety     Arrhythmia     Asthma     Bipolar disorder (HCC)     Cirrhosis of liver (HCC)     Constipation     COPD (chronic obstructive pulmonary disease) (HCC)     Depression     GERD (gastroesophageal reflux disease)     Hepatic encephalopathy (HCC)     Hepatitis C     Pancreatitis, alcoholic     Pulmonary hypertension (HCC)     Seizures ([facility] [address]     5 in the past related to alcohol withdrawal       Past Surgical History:  Past Surgical History:   Procedure Laterality Date     SECTION      CHOLECYSTECTOMY      COLONOSCOPY  14    NORMAL    COLONOSCOPY N/A 2019    COLONOSCOPY DIAGNOSTIC performed by Shaun Quezada MD at [address] [place] [address] ERCP N/A 3/8/2018    ERCP SPINCTER/PAPILLOTOMY; BALLOON SWEEP, STONE EXTRACTION & BIOPSY performed by Shaun Quezada MD at [facility]  2018    [employer]    KNEE ARTHROSCOPY Left 2016    Arthroscopic Chondroplasty, Lateral meniscectomy    SHOULDER SURGERY Left 2019    UPPER GASTROINTESTINAL ENDOSCOPY  14    SCHATZKI RING, MILD CHRONIC GASTRITIS    UPPER GASTROINTESTINAL ENDOSCOPY  2015    MILD CHRONIC GASTRITIS    UPPER GASTROINTESTINAL ENDOSCOPY N/A 2019    EGD BIOPSY performed by [facility]un [facility]zada MD at [facility]       Family History:  Family History   Problem Relation Age of Onset    Cancer Father         Bladder    Cancer Brother        Social History:  Social History     Tobacco Use    Smoking status: Former Smoker     Packs/day: 0.25     Years: 33.00     Pack years: 8.25     Types: Cigarettes     Start date: 1983     Quit date: 2021     Years since quittin.6    Smokeless tobacco: Never Used    Tobacco comment: quit 6 months ago   Vaping Use    Vaping Use: Never used   Substance Use Topics    Alcohol use: Not Currently     Alcohol/week: 0.0 standard drinks     Comment: last drink 31 days ago    Drug use: No     Comment: none       Allergies: Allergies   Allergen Reactions    Chantix [Varenicline] Other (See Comments)     Bad dreams    Penicillins        Home Medications:  Prior to Admission medications    Medication Sig Start Date End Date Taking?  Authorizing Provider   sertraline (ZOLOFT) 50 MG tablet Take 50 mg by mouth daily   Yes Historical Provider, MD   traZODone (DESYREL) 50 MG tablet Take 50 mg by mouth nightly   Yes Historical Provider, MD   nitrofurantoin, macrocrystal-monohydrate, (MACROBID) 100 MG capsule Take 1 capsule by mouth 2 times daily for 10 days 1/18/22 1/28/22 Yes Baldomero Chavez MD   OXYGEN Inhale into the lungs    Historical Provider, MD   lactulose (CHRONULAC) 10 GM/15ML solution take 30 milliliters by mouth four times a day 11/18/19   Nelda Greenberg MD   naproxen (NAPROSYN) 500 MG tablet Take 1 tablet by mouth 2 times daily (with meals) for 15 days  Patient not taking: Reported on 4/13/2021 11/21/18 2/11/20  OLYA Villagomez - CNP   ibuprofen (ADVIL;MOTRIN) 800 MG tablet Take 1 tablet by mouth every 8 hours as needed for Pain 2/5/18 2/11/20  OLYA Rodríguez CNP   calcium carbonate 600 MG TABS tablet Take 1 tablet by mouth daily    Historical Provider, MD   vitamin B-12 (CYANOCOBALAMIN) 1000 MCG tablet Take 1,000 mcg by mouth daily    Historical Provider, MD   QUEtiapine (SEROQUEL) 300 MG tablet Take 1 tablet by mouth nightly.  9/18/13   Lesly Lovell MD       Current Medications:    Current Facility-Administered Medications:     calcium carbonate (TUMS) chewable tablet 500 mg, 500 mg, Oral, Daily, Simmie Ada, DO    vitamin D (CHOLECALCIFEROL) tablet 2,000 Units, 2,000 Units, Oral, Daily, Simmie Ada, DO    oxyCODONE-acetaminophen (PERCOCET) 5-325 MG per tablet 1 tablet, 1 tablet, Oral, Q4H PRN, Simmie Ada, DO    QUEtiapine (SEROQUEL) tablet 300 mg, 300 mg, Oral, Nightly, Simmie Ada, DO    vitamin B-12 (CYANOCOBALAMIN) tablet 1,000 mcg, 1,000 mcg, Oral, Daily, Simmie Ada, DO    sodium chloride flush 0.9 % injection 5-40 mL, 5-40 mL, IntraVENous, 2 times per day, Simmie Ada, DO    sodium chloride flush 0.9 % injection 5-40 mL, 5-40 mL, IntraVENous, PRN, Simmie Ada, DO    0.9 % sodium chloride infusion, 25 mL, IntraVENous, PRN, Simmie Ada, DO    enoxaparin (LOVENOX) injection 30 mg, 30 mg, SubCUTAneous, BID, Simmie Ada, DO    ondansetron (ZOFRAN-ODT) disintegrating tablet 4 mg, 4 mg, Oral, Q8H PRN **OR** ondansetron (ZOFRAN) injection 4 mg, 4 mg, IntraVENous, Q6H PRN, Ramona Wang, DO    polyethylene glycol (GLYCOLAX) packet 17 g, 17 g, Oral, Daily PRN, Ramona Felipe, DO    acetaminophen (TYLENOL) tablet 650 mg, 650 mg, Oral, Q6H PRN **OR** acetaminophen (TYLENOL) suppository 650 mg, 650 mg, Rectal, Q6H PRN, Ramona Wang, DO    ascorbic acid (VITAMIN C) tablet 500 mg, 500 mg, Oral, Daily, Ramona Felipe, DO    zinc sulfate (ZINCATE) capsule 50 mg, 50 mg, Oral, Daily, Ramona Wang, DO    guaiFENesin-dextromethorphan (ROBITUSSIN DM) 100-10 MG/5ML syrup 5 mL, 5 mL, Oral, Q4H PRN, Ramona Wang, DO    dilTIAZem 100 mg in dextrose 5 % 100 mL infusion (ADD-Milburn), 5-15 mg/hr, IntraVENous, Continuous, Juanjose Guardado DO    digoxin (LANOXIN) injection 125 mcg, 125 mcg, IntraVENous, Once, Juanjose Guardado,     Physical Exam:  BP (!) 143/81   Pulse 77   Temp 98.4 °F (36.9 °C) (Oral)   Resp 20   Ht 5' 8\" (1.727 m)   Wt 160 lb (72.6 kg)   LMP 07/19/2006   SpO2 92%   BMI 24.33 kg/m²   Wt Readings from Last 3 Encounters:   01/18/22 160 lb (72.6 kg)   04/13/21 150 lb 6.4 oz (68.2 kg)   09/23/20 157 lb (71.2 kg)     Appearance: Awake, alert, no acute respiratory distress  Skin: Intact, no rash  Head: Normocephalic, atraumatic  Eyes: EOMI, no conjunctival erythema  ENMT: No pharyngeal erythema, MMM, no rhinorrhea  Neck: Supple, no elevated JVP, no carotid bruits  Lungs: Clear to auscultation bilaterally. No wheezes, rales, or rhonchi. Cardiac: PMI nondisplaced, regular rhythm with a tachycardic rate, normal S1 & S2, no murmurs  Abdomen: Soft, nontender, +bowel sounds  Extremities: Moves all extremities x 4, no lower extremity edema  Neurologic: No focal motor deficits apparent, normal mood and affect  Peripheral Pulses: Intact posterior tibial pulses bilaterally    Intake/Output:  No intake or output data in the 24 hours ending 01/19/22 1217  No intake/output data recorded.     Laboratory Tests:  Recent Labs 22  1201      K 3.9   *   CO2 21*   BUN 13   CREATININE 0.7   GLUCOSE 102*   CALCIUM 8.9     Lab Results   Component Value Date    MG 1.7 2022     Recent Labs     22  1201   ALKPHOS 201*   ALT 20   AST 36*   PROT 6.1*   BILITOT 1.6*   LABALBU 3.2*     Recent Labs     22  1201   WBC 5.3   RBC 3.91   HGB 12.2   HCT 37.6   MCV 96.2   MCH 31.2   MCHC 32.4   RDW 16.1*   *   MPV 11.2     Lab Results   Component Value Date    CKTOTAL 39 2013    CKMB 0.2 2013    TROPONINI <0.01 2013    TROPONINI <0.01 2013    TROPONINI <0.01 2013     Lab Results   Component Value Date    INR 1.3 2022    INR 1.2 2018    INR 1.1 2016    PROTIME 14.3 (H) 2022    PROTIME 12.0 2018    PROTIME 11.4 2016     Lab Results   Component Value Date    TSH 1.830 2022     Lab Results   Component Value Date    LABA1C 4.3 2022     Lab Results   Component Value Date    EAG 94 02/15/2018     Lab Results   Component Value Date    CHOL 138 2022    CHOL 183 2015    CHOL 192 2014     Lab Results   Component Value Date    TRIG 88 2022    TRIG 96 2015    TRIG 198 2014     Lab Results   Component Value Date    HDL 65 2022    HDL 66 02/15/2018    HDL 77 2015     Lab Results   Component Value Date    LDLCALC 55 2022    LDLCALC 86 2014    LDLCHOLESTEROL 81 02/15/2018    LDLCHOLESTEROL 87 2015    LDLCHOLESTEROL 48 2013     Lab Results   Component Value Date    LABVLDL 18 2022    VLDL NOT REPORTED 02/15/2018    VLDL NOT REPORTED 2015    VLDL NOT REPORTED 2013     Lab Results   Component Value Date    CHOLHDLRATIO 2.5 02/15/2018    CHOLHDLRATIO 2.4 2015    CHOLALBERTO 2.9 2014     No results for input(s): PROBNP in the last 72 hours.     Cardiac Tests:  EK2022: Sinus rhythm 90 beats minute with PACs    Telemetry: Narrow complex tachycardia likely atrial flutter vs svt with abrupt onset at 1040 this morning    Chest X-ray:   1/18/2022  Impression   1. Bilateral lower lobe pneumonia   2. 1 cm opacity overlying the right 6th rib.  Dedicated CT of the thorax is   recommended for further evaluation. CT chest with contrast 1/19/2022  Impression   1. Scattered mild patchy/nodular ground-glass opacities in the upper lobes. Most notable area density corresponds to the radiographic abnormality. Prem Mola   are likely related to patient's recent history of COVID.  As precaution,   recommend follow-up in 3-6 months to confirm resolution. 2. Large pulmonary arteries raises concern for pulmonary arterial   hypertension. Echocardiogram:   Transthoracic echo 2/2020    · Normal left ventricular size with normal systolic function. EF 55-60%.     · Mild left ventricular hypertrophy. · Moderate left atrial enlargement. Mild right atrial enlargement. · Thickened mitral valve. Mild to moderate mitral regurgitation. · Mild tricuspid regurgitation. Mild to moderately elevated right heart pressures, RVSP 41 mmHg. · Mild pulmonic insufficiency. · Aneurysmal atrial septum. · Left ventricular diastolic dysfunction. · Compared to previous echo 3/19.  RVSP has increased. Stress test:    Pharmacologic stress 2/2020  Imaging: The overall quality of the study is good, Attenuation artifact was present. Post-stress imaging demonstrated normal tracer uptake. Resting imaging demonstrated small, mild mid anterior and distal inferior defects. Gated wall motion study was normal and the left ventricular ejection fraction was 61%. The segmental wall motion was normal.     Cardiac catheterization:    -------------------------------------------------------------------------------------------------------------------------------------------------------------  IMPRESSION:  1. SVT. Possibly atrial flutter versus other supraventricular arrhythmia.   [id number] 1 (female) not previous anticoagulation  2. COVID-19 pneumonia  3. History of paroxysmal A. fib. Low burden on loop recorder  4. History of loop recorder implant  5. History of abnormal stress test 2/2020  6. Pulmonary hypertension  7. Mild to moderate MR, mild TR  8. Alcoholic cirrhosis of the liver, hepatitis C, status post TIPS  9. Thrombocytopenia  10. Bipolar disorder  11. Alcohol abuse  12. COPD    RECOMMENDATIONS:  Attempted vagal maneuvers were unsuccessful including carotid massage and Valsalva maneuver. While I was at the bedside, I had 6 mg of adenosine administered by rapid push followed by rapid saline flush into a less than ideal 20-gauge IV in the forearm. There was initially no effect on the AV node. About 1 minute later the SVT terminated after a ventricular triplet and she went to sinus with PACs.  SVT terminated post adenosine administration (not sure if it was the adenosine that broke it, as she broke with a ventricular triplet, but no clear evidence of AV block as would be expected with adenosine)   Can hold off on diltiazem infusion and digoxin   Start oral diltiazem 243 mg daily   Certainly remains at risk for recurrent arrhythmias   Aggressive electrolyte replacement, maintain K > 4.0 and mag > 2.0, supplementation ordered   Monitor telemetry   Further care per primary and other consultants    Due to the immediate potential for life-threatening deterioration due to SVT with hypotension, I spent 35 minutes at the bedside and on the unit providing critical care. Thank you for allowing me to participate in your patient's care. Please feel free to contact me if you have any questions or concerns. Javier Pelletier MD, [address] [facility]    NOTE: This report was transcribed using voice recognition software. Every effort was made to ensure accuracy; however, inadvertent computerized transcription errors may be present.

## 2022-01-19 NOTE — H&P
Cirrhosis of liver (HCC)     Constipation     COPD (chronic obstructive pulmonary disease) (HCC)     Depression     GERD (gastroesophageal reflux disease)     Hepatic encephalopathy (HCC)     Hepatitis C     Pancreatitis, alcoholic     Pulmonary hypertension (HCC)     Seizures ([facility] [address]     5 in the past related to alcohol withdrawal       Past Surgical History:   Procedure Laterality Date     SECTION      CHOLECYSTECTOMY      COLONOSCOPY  14    NORMAL    COLONOSCOPY N/A 2019    COLONOSCOPY DIAGNOSTIC performed by Dean Wray MD at [address] [place] [address] ERCP N/A 3/8/2018    ERCP SPINCTER/PAPILLOTOMY; BALLOON SWEEP, STONE EXTRACTION & BIOPSY performed by Dean Wray MD at [facility]  2018    [employer]    KNEE ARTHROSCOPY Left 2016    Arthroscopic Chondroplasty, Lateral meniscectomy    SHOULDER SURGERY Left 2019    UPPER GASTROINTESTINAL ENDOSCOPY  14    SCHATZKI RING, MILD CHRONIC GASTRITIS    UPPER GASTROINTESTINAL ENDOSCOPY  2015    MILD CHRONIC GASTRITIS    UPPER GASTROINTESTINAL ENDOSCOPY N/A 2019    EGD BIOPSY [facility] MD [facility]       Prior to Admission medications    Medication Sig Start Date End Date Taking? Authorizing Provider   nitrofurantoin, macrocrystal-monohydrate, (MACROBID) 100 MG capsule Take 1 capsule by mouth 2 times daily for 10 days 22 Yes Robert Yen MD   oxyCODONE-acetaminophen (PERCOCET) 5-325 MG per tablet Take 1 tablet by mouth every 4 hours as needed for Pain. Historical Provider, MD   CARTIA  MG extended release capsule take 1 capsule by mouth daily 20   Maranda Baker MD   albuterol sulfate  (90 Base) MCG/ACT inhaler every 4 (four) hours as needed (summertime).     Historical Provider, MD   OXYGEN Inhale into the lungs    Historical Provider, MD   lactulose (CHRONULAC) 10 GM/15ML solution take 30 milliliters by mouth four times a day 11/18/19   Berenice Balderrama MD   Mirtazapine (REMERON PO) Take by mouth nightly    Historical Provider, MD   naproxen (NAPROSYN) 500 MG tablet Take 1 tablet by mouth 2 times daily (with meals) for 15 days  Patient not taking: Reported on 4/13/2021 11/21/18 2/11/20  OLYA Reagan CNP   Cholecalciferol (VITAMIN D) 2000 units CAPS capsule Take 1 capsule by mouth daily    Historical Provider, MD   ibuprofen (ADVIL;MOTRIN) 800 MG tablet Take 1 tablet by mouth every 8 hours as needed for Pain 2/5/18 2/11/20  OLYA Lynn CNP   alendronate (FOSAMAX) 70 MG tablet Take 1 tablet by mouth every 7 days 6/29/17   Priya Henley MD   calcium carbonate 600 MG TABS tablet Take 1 tablet by mouth daily    Historical Provider, MD   vitamin B-12 (CYANOCOBALAMIN) 1000 MCG tablet Take 1,000 mcg by mouth daily    Historical Provider, MD   magnesium oxide (MAG-OX) 400 MG tablet Take 400 mg by mouth daily 2 tabs twice a day     Historical Provider, MD   QUEtiapine (SEROQUEL) 300 MG tablet Take 1 tablet by mouth nightly. 9/18/13   Ramona Kim MD         Allergies:  Chantix [varenicline] and Penicillins    Social History:    TOBACCO:   reports that she quit smoking about 2 years ago. Her smoking use included cigarettes. She started smoking about 38 years ago. She has a 8.25 pack-year smoking history. She has never used smokeless tobacco.  ETOH:   reports previous alcohol use. Family History:    Reviewed in detail and negative for DM, CAD, Cancer, CVA. Positive as follows\"      Problem Relation Age of Onset    Cancer Father         Bladder    Cancer Brother        REVIEW OF SYSTEMS:   Pertinent positives as noted in the HPI. All other systems reviewed and negative.     PHYSICAL EXAM:  /70   Pulse 94   Temp 97.7 °F (36.5 °C) (Oral)   Resp 16   Ht 5' 8\" (1.727 m)   Wt 160 lb (72.6 kg)   LMP 07/19/2006   SpO2 96%   BMI 24.33 kg/m²   General appearance: No apparent distress, appears stated age and cooperative. HEENT: Normal cephalic, atraumatic without obvious deformity. Pupils equal, round, and reactive to light. Extra ocular muscles intact. Conjunctivae/corneas clear. Neck: Supple, with full range of motion. No jugular venous distention. Trachea midline. Respiratory: Clear to auscultation bilaterally  Cardiovascular: Regular rate and rhythm  Abdomen: Soft, nontender, nondistended  Musculoskeletal: No clubbing, cyanosis, edema of bilateral lower extremities. Brisk capillary refill. Skin: Normal skin color. No rashes or lesions. Neurologic:  Neurovascularly intact without any focal sensory/motor deficits. Cranial nerves: II-XII intact, grossly non-focal.  Psychiatric: Alert and oriented, thought content appropriate, normal insight    Reviewed EKG and CXR personally      CBC:   Recent Labs     01/18/22  1201   WBC 5.3   RBC 3.91   HGB 12.2   HCT 37.6   MCV 96.2   RDW 16.1*   *     BMP:   Recent Labs     01/18/22  1201      K 3.9   *   CO2 21*   BUN 13   CREATININE 0.7     LFT:  Recent Labs     01/18/22  1201   PROT 6.1*   ALKPHOS 201*   ALT 20   AST 36*   BILITOT 1.6*   LIPASE 48     CE:  No results for input(s): Betty Chitina in the last 72 hours. PT/INR: No results for input(s): INR, APTT in the last 72 hours. BNP: No results for input(s): BNP in the last 72 hours.   ESR: No results found for: SEDRATE  CRP:   Lab Results   Component Value Date    CRP 0.05 12/02/2011     D Dimer: No results found for: DDIMER   Folate and B12:   Lab Results   Component Value Date    VUKOAGMY65 423 03/16/2015   ,   Lab Results   Component Value Date    FOLATE 7.4 12/02/2011     Lactic Acid:   Lab Results   Component Value Date    LACTA 2.0 01/18/2022     Thyroid Studies:   Lab Results   Component Value Date    TSH 2.76 02/15/2018    F3ZRERN 47 (L) 07/11/2013    G6TVTHP 4.0 (L) 07/11/2013       Oupatient labs:  Lab Results   Component Value Date    CHOL 183 09/22/2015    TRIG 96 09/22/2015    HDL 66 02/15/2018    LDLCALC 86 05/05/2014    TSH 2.76 02/15/2018    INR 1.2 03/08/2018    LABA1C 4.9 02/15/2018       Urinalysis:    Lab Results   Component Value Date    NITRU Negative 01/18/2022    WBCUA 2-5 01/18/2022    BACTERIA RARE 01/18/2022    RBCUA 0-1 01/18/2022    BLOODU Negative 01/18/2022    SPECGRAV 1.025 01/18/2022    GLUCOSEU Negative 01/18/2022       Imaging:  XR CHEST (2 VW)    Result Date: 1/18/2022  EXAMINATION: TWO XRAY VIEWS OF THE CHEST 1/18/2022 1:11 pm COMPARISON: None. HISTORY: ORDERING SYSTEM PROVIDED HISTORY: weak recent covid TECHNOLOGIST PROVIDED HISTORY: Reason for exam:->weak recent covid What reading provider will be dictating this exam?->CRC FINDINGS: The cardiac silhouette is at upper limits of normal in size. Bibasilar infiltrates are noted There is an opacity seen within the right upper lobe measuring approximately 1 cm. While this overlies the 6th right rib I cannot exclude underlying pulmonary lesion. There is no pleural effusion. 1. Bilateral lower lobe pneumonia 2. 1 cm opacity overlying the right 6th rib. Dedicated CT of the thorax is recommended for further evaluation. CT HEAD WO CONTRAST    Result Date: 1/18/2022  EXAMINATION: CT OF THE HEAD WITHOUT CONTRAST  1/18/2022 7:50 pm TECHNIQUE: CT of the head was performed without the administration of intravenous contrast. Dose modulation, iterative reconstruction, and/or weight based adjustment of the mA/kV was utilized to reduce the radiation dose to as low as reasonably achievable. COMPARISON: None. HISTORY: ORDERING SYSTEM PROVIDED HISTORY: HEAD TRAUMA, CLOSED, MILD, GCS >= 13, NO RISK FACTORS, NEURO EXAM NORMAL TECHNOLOGIST PROVIDED HISTORY: Has a \"code stroke\" or \"stroke alert\" been called? ->No Reason for exam:->altered Decision Support Exception - unselect if not a suspected or confirmed emergency medical condition->Emergency Medical Condition (MA) What reading provider will be dictating this exam?->CRC FINDINGS: BRAIN/VENTRICLES: Small chronic infarctions are seen bilateral frontal and occipital lobes. No edema, hemorrhage or midline shift is seen. Mild-to-moderate volume loss and chronic microvascular ischemic changes are seen in the brain. Prominent retro cerebellar fluid may represent sierra cisterna magna or arachnoid cyst.  No hydrocephalus. ORBITS: The visualized portion of the orbits demonstrate no acute abnormality. SINUSES: The visualized paranasal sinuses and mastoid air cells demonstrate no acute abnormality. SOFT TISSUES/SKULL:  No acute abnormality of the visualized skull or soft tissues. 1.  No acute intracranial abnormality. 2. 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  3. 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 RECOMMENDATIONS: Unavailable       ASSESSMENT:  -COVID-19 infection  -Fatigue  -Tardive dyskinesia  -COPD  -Bipolar 1 disorder  -History of alcohol abuse      PLAN:  -Admit to medicine  -Case management to work on placement issues  -Vitamin C, vitamin D, zinc  -Lovenox 30 mg twice daily  -Continue home medications        Diet: No diet orders on file  Code Status: Prior  Surrogate decision maker confirmed with patient:   Extended Emergency Contact Information  Primary Emergency Contact: Rm Agarwal  Address: [address] [place] [address] Phone: [phone]  Relation: Parent  Secondary Emergency Contact: Cristal Deutsch *hip[place], [address] Phone: [phone]  Relation: Brother/Sister    DVT Prophylaxis: []Lovenox []Heparin []PCD [] [address] []Encouraged ambulation  Disposition: []Med/Surg [] Intermediate [] ICU/CCU  Admit status: [] Observation [] Inpatient     +++++++++++++++++++++++++++++++++++++++++++++++++  Tobin Cornelius DO  [address]  +++++++++++++++++++++++++++++++++++++++++++++++++  NOTE: This report was transcribed using voice recognition software. Every effort was made to ensure accuracy; however, inadvertent computerized transcription errors may be present.

## 2022-01-19 NOTE — CONSULTS
NEUROLOGY CONSULT NOTE      Requesting Physician: Carina Cole DO    Reason for Consult:  Evaluate for possible symptomatic Ruddy cisterna magna. History of Present Illness:  Cain Jimenez is a 63 y.o. female  with h/o seizures, hepatic encephalopathy, alcohol abuse and cirrhosis of the liver, bipolar disorder, chronic pancreatitis, COPD, who was admitted to [facility]  on 1/18/2022 with presentation of fatigue for the last 3 days. Patient has been in a rehab facility with history of alcohol abuse. Over the event there was report of associated confusion family indicated that patient likely was continuing to consume alcohol and may have been in withdrawal at the time. Stomach is concerned that patient had started drinking again, which patient adamantly denied. There was report of poor appetite for the prior 2 days before admission. No report of any other associated events. There are no focal neurologic deficits in association with her symptoms. She denied any recent fevers, chills, chest pain, shortness of breath, nausea, vomiting, flank pain, dysuria, hematuria, constipation, diarrhea, new skin lesions, headaches, dizziness, vertigo, or change in overall neurologic status. Presentation to the ED there was report of dystonic head movements and suspicion of possible tardive dyskinesia. Labs drawn included COVID-19 test that was revealed as positive. Patient subsequently admitted for further evaluation management where CT scan of the head done showed no acute intercranial abnormalities, but there were small chronic infarctions in bilateral frontal and occipital lobes along with the retrocerebellar fluid that may have represented Ruddy cisterna magna or arachnoid cyst.  Neurology consulted for further evaluation. Neurosurgery also consulted and did not feel that this was an acute lesion that would warrant further evaluation.        Past Medical History:        Diagnosis Date    Alcohol abuse, unspecified     Alcoholic cirrhosis of liver (HCC)     Anxiety     Arrhythmia     Asthma     Bipolar disorder (HCC)     Cirrhosis of liver ([facility] [address]     Constipation     COPD (chronic obstructive pulmonary disease) (HCC)     Depression     GERD (gastroesophageal reflux disease)     Hepatic encephalopathy (HCC)     Hepatitis C     Pancreatitis, alcoholic     Pulmonary hypertension (HCC)     Seizures ([facility] [address]     5 in the past related to alcohol withdrawal           Procedure Laterality Date     SECTION      CHOLECYSTECTOMY      COLONOSCOPY  14    NORMAL    COLONOSCOPY N/A 2019    COLONOSCOPY DIAGNOSTIC performed by Marcella Tracy MD at [address] [place] [address] ERCP N/A 3/8/2018    ERCP SPINCTER/PAPILLOTOMY; BALLOON SWEEP, STONE EXTRACTION & BIOPSY performed by [facility]  2018    [employer]    KNEE ARTHROSCOPY Left 2016    Arthroscopic Chondroplasty, Lateral meniscectomy    SHOULDER SURGERY Left 2019    UPPER GASTROINTESTINAL ENDOSCOPY  14    SCHATZKI RING, MILD CHRONIC GASTRITIS    UPPER GASTROINTESTINAL ENDOSCOPY  2015    MILD CHRONIC GASTRITIS    UPPER GASTROINTESTINAL ENDOSCOPY N/A 2019    EGD BIOPSY [facility] by [facility]cell[facility] MD at [facility]       Social History:  Social History     Tobacco Use   Smoking Status Former Smoker    Packs/day: 0.25    Years: 33.00    Pack years: 8.25    Types: Cigarettes    Start date: 1983   Vallie Roller Quit date: 2021    Years since quittin.6   Smokeless Tobacco Never Used   Tobacco Comment    quit 6 months ago     Social History     Substance and Sexual Activity   Alcohol Use Not Currently    Alcohol/week: 0.0 standard drinks    Comment: last drink 31 days ago     Social History     Substance and Sexual Activity   Drug Use No    Comment: none         Family History:       Problem Relation Age of Onset    Cancer Father         Bladder  Cancer Brother        Review of Systems:  Constitutional: Positive for fatigue. Negative for activity change, appetite change, diaphoresis, fever and unexpected weight change. HENT: Negative for congestion. Eyes: Negative for visual disturbance. Respiratory: Negative for cough, shortness of breath, wheezing and stridor. Cardiovascular: Negative for chest pain, palpitations and leg swelling. Gastrointestinal: Negative for abdominal distention, abdominal pain, constipation, diarrhea, nausea and vomiting. Endocrine: Negative for polyphagia and polyuria. Genitourinary: Negative for dysuria and hematuria. Musculoskeletal: Negative for back pain. Skin: Negative for color change, pallor, rash and wound. Neurological: Negative for dizziness and syncope. Hematological: Negative for adenopathy. Does not bruise/bleed easily. Psychiatric/Behavioral: Negative for agitation. Allergies:     Allergies   Allergen Reactions    Chantix [Varenicline] Other (See Comments)     Bad dreams    Penicillins         Current Medications:   calcium carbonate (TUMS) chewable tablet 500 mg, Daily  vitamin D (CHOLECALCIFEROL) tablet 2,000 Units, Daily  oxyCODONE-acetaminophen (PERCOCET) 5-325 MG per tablet 1 tablet, Q4H PRN  QUEtiapine (SEROQUEL) tablet 300 mg, Nightly  vitamin B-12 (CYANOCOBALAMIN) tablet 1,000 mcg, Daily  sodium chloride flush 0.9 % injection 5-40 mL, 2 times per day  sodium chloride flush 0.9 % injection 5-40 mL, PRN  0.9 % sodium chloride infusion, PRN  enoxaparin (LOVENOX) injection 30 mg, BID  ondansetron (ZOFRAN-ODT) disintegrating tablet 4 mg, Q8H PRN   Or  ondansetron (ZOFRAN) injection 4 mg, Q6H PRN  polyethylene glycol (GLYCOLAX) packet 17 g, Daily PRN  acetaminophen (TYLENOL) tablet 650 mg, Q6H PRN   Or  acetaminophen (TYLENOL) suppository 650 mg, Q6H PRN  ascorbic acid (VITAMIN C) tablet 500 mg, Daily  zinc sulfate (ZINCATE) capsule 50 mg, Daily  guaiFENesin-dextromethorphan (ROBITUSSIN DM) 100-10 MG/5ML syrup 5 mL, Q4H PRN  dilTIAZem (CARDIZEM CD) extended release capsule 120 mg, Daily         Physical Exam:  /69   Pulse 72   Temp 98.4 °F (36.9 °C) (Oral)   Resp 18   Ht 5' 8\" (1.727 m)   Wt 160 lb (72.6 kg)   LMP 07/19/2006   SpO2 96%   BMI 24.33 kg/m²  I Body mass index is 24.33 kg/m². I   Wt Readings from Last 1 Encounters:   01/18/22 160 lb (72.6 kg)          HEENT: Normocephalic, atraumatic, no lesions or abnormalities noted. Neck:  no masses, nodes or bruits;  tenderness with palpation right paraspinal  from shoulder up to base of skull with prominent splenius cervicis trapezius/levator tenderness at the shoulder. Lungs:  clear to auscultation  bilaterally     CV: RRR without gallops or murmurs     Extremities: no c/c/e          Neurologic Exam     Mental Status:  Patient was alert, responsive, oriented, appropriate, answering questions, and following commands. Speech was fluent with normal sensorium and cognition. Cranial Nerves: Pupils were equal round and reactive to light and accommodation;    Visual fields were full on confrontation; Extraocular movements were intact; no nystagmus; mildly impaired smooth pursuit with involuntary saccades causing difficulty with tracking. Intact facial sensation to temp, pinprick, and light touch; Symmetric facial movements with good lip and eye closure bilaterally; Hearing was intact; Curry was midline;    Normal palatal elevation with midline uvula  Trapezius strength of 5/5. Tongue was midline with no atrophy or fasciculations  Motor Exam:  Strength was 5/5 throughout; normal tone and bulk; no atrophy or fasiculations noted. Sensory Exam:  Normal sensation to light touch, pin-prick, and temperature; No sensory extinction. Cerebella Exam:  Intact finger-nose-finger, rapidly alternating movements, fine motor movements, and heel-down-shin maneuvers; No cerebellar rebound or drift;   No tremors; choreiform type of head movement with intermittent lipsmacking type movements noted. Gait:  Gait was not tested. Reflexes: brisk reflexes in both upper and lower extremities; sustained clonus in left ankle; Babinski is negative. Negative Rankin. Labs:    CBC:   Recent Labs     01/18/22  1201   WBC 5.3   HGB 12.2   *   MCV 96.2   MCH 31.2   MCHC 32.4   RDW 16.1*     CMP:  Recent Labs     01/18/22  1201      K 3.9   *   CO2 21*   BUN 13   CREATININE 0.7   GFRAA >60   LABGLOM >60   GLUCOSE 102*   CALCIUM 8.9     Liver:   Recent Labs     01/18/22  1201   AST 36*   ALT 20   ALKPHOS 201*   PROT 6.1*   LABALBU 3.2*   BILITOT 1.6*     INR:   Recent Labs     01/19/22  0826   PROTIME 14.3*   INR 1.3       ToxicologyNo results for input(s): PHENYTOIN, CARBTOT, PHENOBARB, VALPROATE in the last 72 hours. Invalid input(s): LAMOTRIG,  KEPPRA  No results for input(s): AMPMETHURSCR, BARBTQTU, BDZQTU, CANNABQUANT, COCMETQTU, OPIAU, PCPQUANT in the last 72 hours. Radiology:  XR CHEST (2 VW)    Result Date: 1/18/2022  EXAMINATION: TWO XRAY VIEWS OF THE CHEST 1/18/2022 1:11 pm COMPARISON: None. HISTORY: ORDERING SYSTEM PROVIDED HISTORY: weak recent covid TECHNOLOGIST PROVIDED HISTORY: Reason for exam:->weak recent covid What reading provider will be dictating this exam?->CRC FINDINGS: The cardiac silhouette is at upper limits of normal in size. Bibasilar infiltrates are noted There is an opacity seen within the right upper lobe measuring approximately 1 cm. While this overlies the 6th right rib I cannot exclude underlying pulmonary lesion. There is no pleural effusion. 1. Bilateral lower lobe pneumonia 2. 1 cm opacity overlying the right 6th rib. Dedicated CT of the thorax is recommended for further evaluation.      CT HEAD WO CONTRAST    Result Date: 1/18/2022  EXAMINATION: CT OF THE HEAD WITHOUT CONTRAST  1/18/2022 7:50 pm TECHNIQUE: CT of the head was performed without the administration of intravenous contrast. Dose modulation, iterative reconstruction, and/or weight based adjustment of the mA/kV was utilized to reduce the radiation dose to as low as reasonably achievable. COMPARISON: None. HISTORY: ORDERING SYSTEM PROVIDED HISTORY: HEAD TRAUMA, CLOSED, MILD, GCS >= 13, NO RISK FACTORS, NEURO EXAM NORMAL TECHNOLOGIST PROVIDED HISTORY: Has a \"code stroke\" or \"stroke alert\" been called? ->No Reason for exam:->altered Decision Support Exception - unselect if not a suspected or confirmed emergency medical condition->Emergency Medical Condition (MA) What reading provider will be dictating this exam?->CRC FINDINGS: BRAIN/VENTRICLES: Small chronic infarctions are seen bilateral frontal and occipital lobes. No edema, hemorrhage or midline shift is seen. Mild-to-moderate volume loss and chronic microvascular ischemic changes are seen in the brain. Prominent retro cerebellar fluid may represent sierra cisterna magna or arachnoid cyst.  No hydrocephalus. ORBITS: The visualized portion of the orbits demonstrate no acute abnormality. SINUSES: The visualized paranasal sinuses and mastoid air cells demonstrate no acute abnormality. SOFT TISSUES/SKULL:  No acute abnormality of the visualized skull or soft tissues. 1.  No acute intracranial abnormality. 2. 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 3. 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 RECOMMENDATIONS: Unavailable     CT CHEST W CONTRAST    Result Date: 1/19/2022  EXAMINATION: CT OF THE CHEST WITH CONTRAST 1/19/2022 1:27 am TECHNIQUE: CT of the chest was performed with the administration of intravenous contrast. Multiplanar reformatted images are provided for review. Dose modulation, iterative reconstruction, and/or weight based adjustment of the mA/kV was utilized to reduce the radiation dose to as low as reasonably achievable. COMPARISON: None.  HISTORY: ORDERING SYSTEM PROVIDED HISTORY: pulmonary nodule TECHNOLOGIST PROVIDED HISTORY: Reason for exam:->pulmonary nodule What reading provider will be dictating this exam?->CRC FINDINGS: Mediastinum: There is no abnormal mediastinal or hilar mass seen. Thoracic aorta is normal caliber. There is no thoracic aortic dissection. Pulmonary arteries are large. This raises concern for pulmonary arterial hypertension. Lungs/pleura: There are mild ground-glass patchy opacities in the upper lobes. Most notable area density corresponds to the radiographic abnormality. Per Fleischner Society guidelines, recommend follow-up in 3-6 months. Upper Abdomen: Patient appears status post tips procedure. Liver appears cirrhotic. There is splenomegaly measuring 14 cm. Soft Tissues/Bones: No acute abnormality     1. Scattered mild patchy/nodular ground-glass opacities in the upper lobes. Most notable area density corresponds to the radiographic abnormality. These are likely related to patient's recent history of COVID. As precaution, recommend follow-up in 3-6 months to confirm resolution. 2. Large pulmonary arteries raises concern for pulmonary arterial hypertension. RECOMMENDATIONS: Unavailable       The patient's records from referring provider and available information in the EHR was reviewed. Impression:  1. Dyskinesia: Head movement with possible athetosis versus cervical dystonia versus tardive dyskinesia. 2. Sustained clonus left ankle: Unilateral symptoms suggestive more likely cervical cord pathology versus brain pathology. Patient with a history of chronic low back pain with right neck pain noted in exam.  3. Ruddy cisterna magna: Chronic congenital condition and will defer to neurosurgical management. 4. COVID-19 infection: No significant symptoms at this time, but will need continued monitoring and isolation. 5.     Active Problems:    COVID-19  Resolved Problems:    * No resolved hospital problems.  *      Recommendations: 1. X-rays of cervical spine to consideration of MRI of C-spine  2. X-ray of the lumbar spine given chronic low back pain and clonus of the left foot. 3. Will defer to neurosurgery for further evaluation management of Ruddy cisterna magna  4. Reviewed medications for possible medication induced dyskinesia. 5. Continue medications as before. 6. Further on follow-up. It was my pleasure to evaluate Naty Sesay today. Please call with questions.       Electronically signed by Noreen Myers MD on 1/19/2022 at 6:38 PM No

## 2022-01-19 NOTE — ED NOTES
Transport to the floor has been requested. All patient belongings with patient.       Tammy Lopez RN  01/19/22 [id number]

## 2022-01-19 NOTE — ED PROVIDER NOTES
[address]      Pt Name: Pallavi Knox  MRN: [id number]  [place] 1960  Date of evaluation: 1/18/2022      CHIEF COMPLAINT       Chief Complaint   Patient presents with    Fatigue     sent from  recovery center for fatigue x 3 days, possible withdrawal        HPI  Pallavi Knox is a 63 y.o. female history of alcohol abuse coming from a rehab facility for fatigue for the last 3 days. Patient's facility is concerned that the patient has had confusion and strange head movements. They think that the patient is \"drinking. \"  They are concerned for confusion and they did not want her to take any of her medications due to her mental status. Per patient she states that she has been tired for the last 3 days. She has had decreased appetite for the last 2 days. She is adamant that she has not been drinking. Denies any recent fevers, chills, nausea, vomiting, chest pain, shortness of breath, abdominal pain, flank pain, dysuria, hematuria, diarrhea, constipation, new rashes or sores. Except as noted above the remainder of the review of systems was reviewed and negative. Review of Systems   Constitutional: Positive for fatigue. Negative for activity change, appetite change, diaphoresis, fever and unexpected weight change. HENT: Negative for congestion. Eyes: Negative for visual disturbance. Respiratory: Negative for cough, shortness of breath, wheezing and stridor. Cardiovascular: Negative for chest pain, palpitations and leg swelling. Gastrointestinal: Negative for abdominal distention, abdominal pain, constipation, diarrhea, nausea and vomiting. Endocrine: Negative for polyphagia and polyuria. Genitourinary: Negative for dysuria and hematuria. Musculoskeletal: Negative for back pain. Skin: Negative for color change, pallor, rash and wound. Neurological: Negative for dizziness and syncope. Hematological: Negative for adenopathy. Does not bruise/bleed easily. Psychiatric/Behavioral: Negative for agitation. Physical Exam  Vitals and nursing note reviewed. Constitutional:       General: She is not in acute distress. Appearance: Normal appearance. She is not ill-appearing or diaphoretic. HENT:      Head: Normocephalic and atraumatic. Right Ear: External ear normal.      Nose: Nose normal.   Eyes:      Extraocular Movements: Extraocular movements intact. Pupils: Pupils are equal, round, and reactive to light. Cardiovascular:      Rate and Rhythm: Normal rate and regular rhythm. Pulses: Normal pulses. Heart sounds: Normal heart sounds. Pulmonary:      Effort: Pulmonary effort is normal.      Breath sounds: Normal breath sounds. Abdominal:      General: Abdomen is flat. Bowel sounds are normal.      Palpations: Abdomen is soft. Tenderness: There is no abdominal tenderness. There is no right CVA tenderness, left CVA tenderness or rebound. Negative signs include Woods's sign, Rovsing's sign and McBurney's sign. Musculoskeletal:         General: Normal range of motion. Cervical back: Normal range of motion. Skin:     General: Skin is warm and dry. Capillary Refill: Capillary refill takes less than 2 seconds. Neurological:      General: No focal deficit present. Mental Status: She is alert and oriented to person, place, and time. Gait: Gait normal.      Comments: Tardive dyskinesia. No dysmetria no dysarthria no dysdiadochokinesia.    Psychiatric:         Mood and Affect: Mood normal.          Procedures     MDM  Number of Diagnoses or Management Options  COVID-19  Generalized weakness  Pneumonia due to infectious organism, unspecified laterality, unspecified part of lung  Tardive dyskinesia  Urinary tract infection without hematuria, site unspecified  Diagnosis management comments: 62-year-old female presents from a rehabilitation facility for fatigue. Vitals within normal limits. On physical exam patient is in no acute distress. She is alert and oriented x3. She has a dystonic movements of her head. Consistent with tardive dyskinesia. No meningeal signs. Lungs clear to auscultation bilaterally no wheeze rales rhonchi. Normal S1-S2. M soft nontender no rebound or guarding. No bilateral leg edema. Diagnostic labs and imaging interpreted reviewed. Soo Miracle is only 8. Patient did test positive for benzodiazepines in her urine drug screen. EKG shows normal sinus rhythm. Patient serum drug is negative. CT head is negative for any acute process. Labs are significant for suspected urinary tract infection, she is COVID-positive, but she is not in respiratory distress. Patient given Rocephin in the department. Patient has been admitted to [place] [id number]. ED Course as of 22   2205 On reevaluation. Patient is no acute distress. She is unable to go to back to her facility. She is COVID-positive. Will admit patient for placement. [JV]      ED Course User Index  [JV] Sherita Mckenzie MD         --------------------------------------------- PAST HISTORY ---------------------------------------------  Past Medical History:  has a past medical history of Alcohol abuse, unspecified, Alcoholic cirrhosis of liver ([facility] [address] Anxiety, Arrhythmia, Asthma, Bipolar disorder ([facility] [facility] Cirrhosis of liver ([facility] [facility] Constipation, COPD (chronic obstructive pulmonary disease) ([facility] [address] [facility] Depression, GERD (gastroesophageal reflux disease), Hepatic encephalopathy ([facility] [address] Hepatitis C, Pancreatitis, alcoholic, Pulmonary hypertension ([facility] [address] and Seizures ([facility] [address] Past Surgical History:  has a past surgical history that includes Cholecystectomy; Hysterectomy;  section; Colonoscopy (14); Upper gastrointestinal endoscopy (14); Upper gastrointestinal endoscopy (2015);  Knee arthroscopy (Left, 2016); ERCP (N/A, 3/8/2018); Insertable Cardiac Monitor (05/18/2018); Upper gastrointestinal endoscopy (N/A, 6/26/2019); Colonoscopy (N/A, 6/26/2019); and shoulder surgery (Left, 12/19/2019). Social History:  reports that she quit smoking about 2 years ago. Her smoking use included cigarettes. She started smoking about 38 years ago. She has a 8.25 pack-year smoking history. She has never used smokeless tobacco. She reports previous alcohol use. She reports that she does not use drugs. Family History: family history includes Cancer in her brother and father. The patients home medications have been reviewed.     Allergies: Chantix [varenicline] and Penicillins    -------------------------------------------------- RESULTS -------------------------------------------------    LABS:  Results for orders placed or performed during the hospital encounter of 01/18/22   COVID-19 & Influenza Combo    Specimen: Nasopharyngeal Swab   Result Value Ref Range    SARS-CoV-2 RNA, RT PCR DETECTED (A) NOT DETECTED    INFLUENZA A NOT DETECTED NOT DETECTED    INFLUENZA B NOT DETECTED NOT DETECTED   CBC auto differential   Result Value Ref Range    WBC 5.3 4.5 - 11.5 E9/L    RBC 3.91 3.50 - 5.50 E12/L    Hemoglobin 12.2 11.5 - 15.5 g/dL    Hematocrit 37.6 34.0 - 48.0 %    MCV 96.2 80.0 - 99.9 fL    MCH 31.2 26.0 - 35.0 pg    MCHC 32.4 32.0 - 34.5 %    RDW 16.1 (H) 11.5 - 15.0 fL    Platelets 420 (L) 215 - 450 E9/L    MPV 11.2 7.0 - 12.0 fL    Neutrophils % 65.1 43.0 - 80.0 %    Immature Granulocytes % 0.4 0.0 - 5.0 %    Lymphocytes % 19.5 (L) 20.0 - 42.0 %    Monocytes % 11.6 2.0 - 12.0 %    Eosinophils % 2.8 0.0 - 6.0 %    Basophils % 0.6 0.0 - 2.0 %    Neutrophils Absolute 3.43 1.80 - 7.30 E9/L    Immature Granulocytes # 0.02 E9/L    Lymphocytes Absolute 1.03 (L) 1.50 - 4.00 E9/L    Monocytes Absolute 0.61 0.10 - 0.95 E9/L    Eosinophils Absolute 0.15 0.05 - 0.50 E9/L    Basophils Absolute 0.03 0.00 - 0.20 E9/L   Comprehensive Metabolic Panel   Result Value Ref Range    Sodium 144 132 - 146 mmol/L    Potassium 3.9 3.5 - 5.0 mmol/L    Chloride 114 (H) 98 - 107 mmol/L    CO2 21 (L) 22 - 29 mmol/L    Anion Gap 9 7 - 16 mmol/L    Glucose 102 (H) 74 - 99 mg/dL    BUN 13 6 - 23 mg/dL    CREATININE 0.7 0.5 - 1.0 mg/dL    GFR Non-African American >60 >=60 mL/min/1.73    GFR African American >60     Calcium 8.9 8.6 - 10.2 mg/dL    Total Protein 6.1 (L) 6.4 - 8.3 g/dL    Albumin 3.2 (L) 3.5 - 5.2 g/dL    Total Bilirubin 1.6 (H) 0.0 - 1.2 mg/dL    Alkaline Phosphatase 201 (H) 35 - 104 U/L    ALT 20 0 - 32 U/L    AST 36 (H) 0 - 31 U/L   Lactic Acid, Plasma   Result Value Ref Range    Lactic Acid 2.0 0.5 - 2.2 mmol/L   Lipase   Result Value Ref Range    Lipase 48 13 - 60 U/L   Urinalysis with Microscopic   Result Value Ref Range    Color, UA Yellow Straw/Yellow    Clarity, UA Clear Clear    Glucose, Ur Negative Negative mg/dL    Bilirubin Urine Negative Negative    Ketones, Urine Negative Negative mg/dL    Specific Gravity, UA 1.025 1.005 - 1.030    Blood, Urine Negative Negative    pH, UA 6.0 5.0 - 9.0    Protein, UA Negative Negative mg/dL    Urobilinogen, Urine 2.0 (A) <2.0 E.U./dL    Nitrite, Urine Negative Negative    Leukocyte Esterase, Urine TRACE (A) Negative    WBC, UA 2-5 0 - 5 /HPF    RBC, UA 0-1 0 - 2 /HPF    Epithelial Cells, UA RARE /HPF    Bacteria, UA RARE (A) None Seen /HPF    Crystals, UA Few (A) None Seen /HPF   Troponin   Result Value Ref Range    Troponin, High Sensitivity 8 0 - 9 ng/L   Serum Drug Screen   Result Value Ref Range    Ethanol Lvl <10 mg/dL    Acetaminophen Level <5.0 (L) 10.0 - 25.9 mcg/mL    Salicylate, Serum <7.6 0.0 - 30.0 mg/dL    TCA Scrn NEGATIVE Cutoff:300 ng/mL   Urine Drug Screen   Result Value Ref Range    Amphetamine Screen, Urine NOT DETECTED Negative <1000 ng/mL    Barbiturate Screen, Ur NOT DETECTED Negative < 200 ng/mL    Benzodiazepine Screen, Urine POSITIVE (A) Negative < 200 ng/mL    Cannabinoid Scrn, Ur NOT DETECTED Negative < 50ng/mL    Cocaine Metabolite Screen, Urine NOT DETECTED Negative < 300 ng/mL    Opiate Scrn, Ur NOT DETECTED Negative < 300ng/mL    PCP Screen, Urine NOT DETECTED Negative < 25 ng/mL    Methadone Screen, Urine NOT DETECTED Negative <300 ng/mL    Oxycodone Urine NOT DETECTED Negative <100 ng/mL    FENTANYL SCREEN, URINE NOT DETECTED Negative <1 ng/mL    Drug Screen Comment: see below    Troponin   Result Value Ref Range    Troponin, High Sensitivity 7 0 - 9 ng/L   EKG 12 Lead   Result Value Ref Range    Ventricular Rate 90 BPM    Atrial Rate 90 BPM    QRS Duration 92 ms    Q-T Interval 370 ms    QTc Calculation (Bazett) 452 ms    R Axis -168 degrees    T Axis 67 degrees       RADIOLOGY:  CT HEAD WO CONTRAST   Final Result   1. No acute intracranial abnormality. 2. 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 3. 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      RECOMMENDATIONS:   Unavailable         XR CHEST (2 VW)   Final Result   1. Bilateral lower lobe pneumonia   2. 1 cm opacity overlying the right 6th rib. Dedicated CT of the thorax is   recommended for further evaluation. CT CHEST W CONTRAST    (Results Pending)       EKG: This EKG is signed and interpreted by me. Heart rate 90. Sinus rhythm with arrhythmia. Normal axis deviation. QTc 452. No ST elevations or depressions. Stable as compared to previous EKG.       ------------------------- NURSING NOTES AND VITALS REVIEWED ---------------------------  Date / Time Roomed:  1/18/2022  4:40 PM  ED Bed Assignment:  [id number]    The nursing notes within the ED encounter and vital signs as below have been reviewed.      Patient Vitals for the past 24 hrs:   BP Temp Temp src Pulse Resp SpO2 Height Weight   01/18/22 1056 122/70 97.7 °F (36.5 °C) Oral 94 16 96 % 5' 8\" (1.727 m) 160 lb (72.6 kg)       Oxygen Saturation Interpretation: Normal    ------------------------------------------ PROGRESS NOTES ------------------------------------------    Counseling:  I have spoken with the patient and discussed todays results, in addition to providing specific details for the plan of care and counseling regarding the diagnosis and prognosis. Their questions are answered at this time and they are agreeable with the plan of admission.    --------------------------------- ADDITIONAL PROVIDER NOTES ---------------------------------  Consultations:  Spoke with Dr. Yves Valentin. Discussed case. They will admit the patient. This patient's ED course included: a personal history and physicial examination, re-evaluation prior to disposition, multiple bedside re-evaluations and IV medications    This patient has remained hemodynamically stable during their ED course. Diagnosis:  1. Generalized weakness    2. Urinary tract infection without hematuria, site unspecified    3. Pneumonia due to infectious organism, unspecified laterality, unspecified part of lung    4. COVID-19    5. Tardive dyskinesia        Disposition:  Patient's disposition: Admit to med/surg floor  Patient's condition is fair.           Sunday Rao MD  Resident  01/18/22 [id number]

## 2022-01-19 NOTE — CONSULTS
NSx Staff:    Patient has tested positive for COVID-19. CT reviewed showing sierra cisterna magna which is not acute and is an incidental finding upon work up for AMS. Will have the patient follow up in the office to decrease the risk of exposure and in order to conserve PPE.

## 2022-01-19 NOTE — SIGNIFICANT EVENT
Rapid Response Team Note  Date of event: 1/19/2022   Time of event: 1995 [facility] 63y.o. year old female   YOB: 1960   Admit date:  1/18/2022   Location: 8935/3030-Y   Witnessed? : [x]Yes  [] No  Monitored? : [x]Yes  [] No  Code status: [] Full  [] DNR-CCA  []DNR-CC  ______________________________________________________________________  Reason for RRT:    [] RR < 8     [] RR > 28   [] SpO2 <90%   [] HR < 40 bpm   [x] HR > 130 bpm  [] SBP < 90 mmHg    [] LOC    [] Seizures   [] Significant Bleeding Event    [] Other:     Subjective:   RRT called for tachycardia. Patient in 140s-150s. BP stable and patient without complaints of SOB. Mild chest discomfort. EKG showed sinus tach, however on monitor looked intermittently like afib RVR. He does have a history of afib. She is not in any distress at time. Cardizem bolus was given. Cardiology also consulted.  Patient ultimately given adenosine and restarted on cardizem po       Objective:   Vital signs: BP:    /RR: 142   /HR:     /Temp:    Initial Condition:  Conscious   [x] Yes  [] No     Breathing [x] Yes  [] No     Pulse  [x] Yes  [] No    Airway:   [x] Open/ Clear     Intervention: [] None  [] Pooled secretions     [] Suctioned  [] Stridor      [] Intubation    Lungs:   [] Symmetrical chest rise/ CTABL Intervention: [] None  [] Use of accessory muscles    [] NIV (CPAP/BiPAP)  [] Cyanosis      [] Nasal Oxygen/Mask  [] Wheezing       [] ABG             [] CXR  [] Other:     Circulation:   Rhythm:  [x] Sinus tach/intermittent afib rvr [] Other:   Intervention: [] None            [] IV Access  [] Peripheral              [] Central            [x] EKG            [] Cardioversion            [] Defibrillation     Capillary Refill:  [] > 2 seconds [x] < 2 seconds    Neurologic:   [] NIHSS      [] Pupillary Response:     Response to pain:   [x] Yes  [] No  Follow commands:  [x] Yes  [] No  Facial asymmetry:  [] Yes  [x] No  Motor strength:  [x] Equal  [] Focal deficit:   Diagnostic Test:    EKG:    Sinus tach  ABG:    No results found for: PH, PCO2, PO2, HCO3, O2SAT  Medication(s) Given:  []  Narcan (Naloxone)   []  Romazicon (Flumazenil)    []  Breathing Treatment    []  Steroids (Prednisone, Solu-Medrol)  [x]  Adenosine  [] Cardiac Medicines:     Infusion(s):   [x] Normal Saline 1L  Rate:   cc/hr      [] Lactate Ringers      [] Other:     Imaging:   [] CXR:   [] Normal         [] Pneumothorax         [] Pulmonary Edema  [] Infiltrate          [] CT Head  [] Normal          [] ICB          [] SAH          [] Other:           Teams Assessment and Plan:  1. Afib RVR/sinus tachycardia- given IV bolus cardizem and 1L NS to try to discern rhythm. Patient has history of atrial fibrillation and states she takes cardizem at home. Cardiology consulted and she was ultimately given adenosine for possible SVT with improvement. ?  ?  ? Disposition:  [x] No transfer   [] Transfer to monitor floor  [] Transfer to: [] MICU [] NICU [] CCU [] SICU      Sheba Abdi[address]    > 35 minutes of CCT spent with the patient, reviewing the chart including imaging studies, and discussing the case with other health care professionals.

## 2022-01-19 NOTE — ED NOTES
Bed: 10  Expected date:   Expected time:   Means of arrival:   Comments:  Oliver Guthrie, RN  01/19/22 3798

## 2022-01-19 NOTE — CARE COORDINATION
Care Coordination + covid  The patient was not feeling well increased fatigue and was sent from a recovery center with a 3 day history of fatigue. Pmh includes etoh abuse, cirrhosis of the liver, bipolar, copd. The patient has had a decrease in appetite and been excessively tired. Patient displaying dystonic movements of head. Possible tardive dyskinesia. She tested + for covid on admission. Chest film shows bilateral lobe pneumonia and a 1 cm opacity overlying the right 6th rib. The patient was admitted from [facility] addiction treatment facility.+ Neurosurgery on consult. cardiology on consult, psych on consult. RRT was called this am due to tachycardia. The patient is on room air. See psych note. Pt ot has been ordered. The patient was just admitted to this recovery facility last Friday from [place] and has been getting sicker ever since. I called [facility] treatment facility here in [place] and spoke to the patients nurse Blaine Smith she told me the patient came there last Friday and is unable to care for herself they have her medications and belongings and will have them delivered here when needed but they will not take her back. I spoke to the patients sister Kishan Torrez and she tells me she took all her belongings and meds to the [facility] facility is [facility] but she ended up here in [facility] as the patient didn't want to go to the [facility] facility and the family has no idea of this. I told the sister once pt ot done and she will need to go to a skilled facility that takes + covid patients and I asked her to give me a list of facilities they are interested. The patient ordinally lives with her mom one story home 1 step to enter. Independent prior to the hospital admission in December her PCP is Sherita Faulkner is Stephanie Cancino MD and her pharmacy is [facility] in [facility].  I will follow

## 2022-01-19 NOTE — ED NOTES
Patient A&OX4, respirations non-labored, skin warm/dry, no distress noted. Patient notified of bed assignment. Patient states she needs to use the restroom before she goes to room. Patient states she needs assistance due to weakness.  Assisted patient to ambulate restroom per request.      Tammy Lopez RN  01/19/22 [id number]

## 2022-01-19 NOTE — CONSULTS
Discussed this consult with Dr. Lida Garcia. Psychiatry was consulted for psych eval, tardive dyskinesia. This patient is currently COVID-positive and in order to minimize exposure and and spread of COVID-19, nonessential non- emergent consults should be managed on an outpatient basis. Tardive dyskinesia is a chronic condition which can be managed by outpatient mental health facility a psych eval is also able to be conducted on an outpatient basis. Will recommend that  refer patient to the outpatient mental health agency at the time of discharge. Please reconsult if there is any acute emergent psychiatric needs such as is patient suicidal, homicidal, psychotic or manic.

## 2022-01-19 NOTE — PROGRESS NOTES
Hospitalist Progress Note      SYNOPSIS: Patient admitted on 1/18/2022  who  has a past medical history of Alcohol abuse, unspecified, Alcoholic cirrhosis of liver ([address] Anxiety, Arrhythmia, Asthma, Bipolar disorder ([address] Cirrhosis of liver ([address] Constipation, COPD (chronic obstructive pulmonary disease) ([address] Depression, GERD (gastroesophageal reflux disease), Hepatic encephalopathy ([address] Hepatitis C, Pancreatitis, alcoholic, Pulmonary hypertension ([address] and Seizures ([facility] [address]    Robin L Chevalier is a 63 y.o. female history of alcohol abuse coming from a rehab facility for fatigue for the last 3 days.  Patient's facility is concerned that the patient has had confusion and strange head movements. Romelia Cordova think that the patient is \"drinking. \" Romelia Cordova are concerned for confusion and they did not want her to take any of her medications due to her mental status.  Per patient she states that she has been tired for the last 3 days. Jailene Mendoza has had decreased appetite for the last 2 days. Jailene Mendoza is adamant that she has not been drinking.  Denies any recent fevers, chills, nausea, vomiting, chest pain, shortness of breath, abdominal pain, flank pain, dysuria, hematuria, diarrhea, constipation, new rashes or sores.     Vital signs within normal limits and stable. Patient displaying dystonic movements of head. Possible tardive dyskinesia. Laboratory studies were unremarkable. COVID-19 positive. Rehab is unable to take patient back testing positive for COVID-19. Medicine consulted for admission. 1/19 Patient went into RVR vs SVT, cardiology consulted    SUBJECTIVE:  Stable overnight. No other overnight issues reported. Patient seen and examined  Records reviewed. RRT called for tachycardia. Patient in 140s-150s. BP stable and patient without complaints of SOB. Mild chest discomfort. EKG showed sinus tach, however on monitor looked intermittently like afib RVR when slowed. She does have a history of afib.  She is ulcers  Musculoskeletal: No clubbing, cyanosis, no bilateral lower extremity edema. Brisk capillary refill. Skin:  No rashes  on visible skin  Neurologic: awake, alert and following commands     ASSESSMENT and PLAN:  · COVID-19 infection  · Fatigue  · SVT/Possibly atrial flutter versus other supraventricular arrhythmia. She states she has a history of afib and is on cardizem. RRT called 1/19, given cardizem, adenosine. Maintain K > 4.0 and mag > 2.0, supplementation ordered. CTA no PE  · Paroxysmal afib- low burden on loop recorder. · Tardive dyskinesia- Tardive dyskinesia is a chronic condition which can be managed by outpatient mental health facility a psych eval is also able to be conducted on an outpatient basis. Will recommend that  refer patient to the outpatient mental health agency at the time of discharge.    · COPD  · Bipolar 1 disorder  · History of alcohol abuse- she states she has not drank for 30 days  · Pulmonary hypertension  · Thrombocytopenia  · Alcoholic cirrhosis of the liver, hepatitis C- status post TIPS          Medications:  REVIEWED DAILY    Infusion Medications    sodium chloride       Scheduled Medications    calcium carbonate  500 mg Oral Daily    vitamin D  2,000 Units Oral Daily    QUEtiapine  300 mg Oral Nightly    vitamin B-12  1,000 mcg Oral Daily    sodium chloride flush  5-40 mL IntraVENous 2 times per day    enoxaparin  30 mg SubCUTAneous BID    ascorbic acid  500 mg Oral Daily    zinc sulfate  50 mg Oral Daily    dilTIAZem  120 mg Oral Daily    magnesium sulfate  2,000 mg IntraVENous Once     PRN Meds: oxyCODONE-acetaminophen, sodium chloride flush, sodium chloride, ondansetron **OR** ondansetron, polyethylene glycol, acetaminophen **OR** acetaminophen, guaiFENesin-dextromethorphan    Labs:     Recent Labs     01/18/22  1201   WBC 5.3   HGB 12.2   HCT 37.6   *       Recent Labs     01/18/22  1201 01/19/22  0826     --    K 3.9  --    CL 114*  --    CO2 21*  --    BUN 13  --    CREATININE 0.7  --    CALCIUM 8.9  --    PHOS  --  3.7       Recent Labs     01/18/22  1201   PROT 6.1*   ALKPHOS 201*   ALT 20   AST 36*   BILITOT 1.6*   LIPASE 48       Recent Labs     01/19/22  0826   INR 1.3       No results for input(s): Jackie Vasquez in the last 72 hours. Chronic labs:    Lab Results   Component Value Date    CHOL 138 01/19/2022    TRIG 88 01/19/2022    HDL 65 01/19/2022    LDLCALC 55 01/19/2022    TSH 1.830 01/19/2022    INR 1.3 01/19/2022    LABA1C 4.3 01/19/2022       Radiology: REVIEWED DAILY    +++++++++++++++++++++++++++++++++++++++++++++++++  DO Eliseo Obregon Ra Physician - [address] [place], [place]  +++++++++++++++++++++++++++++++++++++++++++++++++  NOTE: This report was transcribed using voice recognition software. Every effort was made to ensure accuracy; however, inadvertent computerized transcription errors may be present.

## 2022-01-20 ENCOUNTER — APPOINTMENT (OUTPATIENT)
Dept: MRI IMAGING | Age: 62
DRG: 137 | End: 2022-01-20
Payer: MEDICAID

## 2022-01-20 LAB
ALBUMIN SERPL-MCNC: 2.9 G/DL (ref 3.5–5.2)
ALP BLD-CCNC: 208 U/L (ref 35–104)
ALT SERPL-CCNC: 21 U/L (ref 0–32)
AMMONIA: 76 UMOL/L (ref 11–51)
ANION GAP SERPL CALCULATED.3IONS-SCNC: 12 MMOL/L (ref 7–16)
AST SERPL-CCNC: 45 U/L (ref 0–31)
BILIRUB SERPL-MCNC: 1.8 MG/DL (ref 0–1.2)
BUN BLDV-MCNC: 15 MG/DL (ref 6–23)
CALCIUM SERPL-MCNC: 9 MG/DL (ref 8.6–10.2)
CHLORIDE BLD-SCNC: 113 MMOL/L (ref 98–107)
CO2: 19 MMOL/L (ref 22–29)
CREAT SERPL-MCNC: 0.8 MG/DL (ref 0.5–1)
EKG ATRIAL RATE: 144 BPM
EKG ATRIAL RATE: 85 BPM
EKG P AXIS: 65 DEGREES
EKG P-R INTERVAL: 120 MS
EKG P-R INTERVAL: 160 MS
EKG Q-T INTERVAL: 322 MS
EKG Q-T INTERVAL: 400 MS
EKG QRS DURATION: 84 MS
EKG QRS DURATION: 86 MS
EKG QTC CALCULATION (BAZETT): 476 MS
EKG QTC CALCULATION (BAZETT): 498 MS
EKG R AXIS: 150 DEGREES
EKG R AXIS: 173 DEGREES
EKG T AXIS: 42 DEGREES
EKG T AXIS: 68 DEGREES
EKG VENTRICULAR RATE: 144 BPM
EKG VENTRICULAR RATE: 85 BPM
GFR AFRICAN AMERICAN: >60
GFR NON-AFRICAN AMERICAN: >60 ML/MIN/1.73
GLUCOSE BLD-MCNC: 96 MG/DL (ref 74–99)
HCT VFR BLD CALC: 40.9 % (ref 34–48)
HEMOGLOBIN: 12.7 G/DL (ref 11.5–15.5)
MAGNESIUM: 1.8 MG/DL (ref 1.6–2.6)
MCH RBC QN AUTO: 31.3 PG (ref 26–35)
MCHC RBC AUTO-ENTMCNC: 31.1 % (ref 32–34.5)
MCV RBC AUTO: 100.7 FL (ref 80–99.9)
PDW BLD-RTO: 17 FL (ref 11.5–15)
PLATELET # BLD: 93 E9/L (ref 130–450)
PLATELET CONFIRMATION: NORMAL
PMV BLD AUTO: 10.9 FL (ref 7–12)
POTASSIUM SERPL-SCNC: 4 MMOL/L (ref 3.5–5)
RBC # BLD: 4.06 E12/L (ref 3.5–5.5)
SODIUM BLD-SCNC: 144 MMOL/L (ref 132–146)
TOTAL PROTEIN: 6.1 G/DL (ref 6.4–8.3)
WBC # BLD: 3.7 E9/L (ref 4.5–11.5)

## 2022-01-20 PROCEDURE — 93010 ELECTROCARDIOGRAM REPORT: CPT | Performed by: INTERNAL MEDICINE

## 2022-01-20 PROCEDURE — 6360000004 HC RX CONTRAST MEDICATION: Performed by: RADIOLOGY

## 2022-01-20 PROCEDURE — 70553 MRI BRAIN STEM W/O & W/DYE: CPT

## 2022-01-20 PROCEDURE — 2700000000 HC OXYGEN THERAPY PER DAY

## 2022-01-20 PROCEDURE — 36415 COLL VENOUS BLD VENIPUNCTURE: CPT

## 2022-01-20 PROCEDURE — 2580000003 HC RX 258: Performed by: FAMILY MEDICINE

## 2022-01-20 PROCEDURE — 6370000000 HC RX 637 (ALT 250 FOR IP): Performed by: INTERNAL MEDICINE

## 2022-01-20 PROCEDURE — 6370000000 HC RX 637 (ALT 250 FOR IP): Performed by: FAMILY MEDICINE

## 2022-01-20 PROCEDURE — 99233 SBSQ HOSP IP/OBS HIGH 50: CPT | Performed by: INTERNAL MEDICINE

## 2022-01-20 PROCEDURE — 83735 ASSAY OF MAGNESIUM: CPT

## 2022-01-20 PROCEDURE — 80053 COMPREHEN METABOLIC PANEL: CPT

## 2022-01-20 PROCEDURE — 72156 MRI NECK SPINE W/O & W/DYE: CPT

## 2022-01-20 PROCEDURE — 96372 THER/PROPH/DIAG INJ SC/IM: CPT

## 2022-01-20 PROCEDURE — 82140 ASSAY OF AMMONIA: CPT

## 2022-01-20 PROCEDURE — 6360000002 HC RX W HCPCS: Performed by: FAMILY MEDICINE

## 2022-01-20 PROCEDURE — 85027 COMPLETE CBC AUTOMATED: CPT

## 2022-01-20 PROCEDURE — G0378 HOSPITAL OBSERVATION PER HR: HCPCS

## 2022-01-20 PROCEDURE — 99233 SBSQ HOSP IP/OBS HIGH 50: CPT | Performed by: CLINICAL NURSE SPECIALIST

## 2022-01-20 PROCEDURE — A9579 GAD-BASE MR CONTRAST NOS,1ML: HCPCS | Performed by: RADIOLOGY

## 2022-01-20 PROCEDURE — 6360000002 HC RX W HCPCS: Performed by: INTERNAL MEDICINE

## 2022-01-20 PROCEDURE — 1200000000 HC SEMI PRIVATE

## 2022-01-20 RX ORDER — LACTULOSE 10 G/15ML
20 SOLUTION ORAL EVERY 6 HOURS SCHEDULED
Status: DISCONTINUED | OUTPATIENT
Start: 2022-01-20 | End: 2022-01-22 | Stop reason: HOSPADM

## 2022-01-20 RX ORDER — DEXAMETHASONE 4 MG/1
6 TABLET ORAL DAILY
Status: DISCONTINUED | OUTPATIENT
Start: 2022-01-20 | End: 2022-01-22 | Stop reason: HOSPADM

## 2022-01-20 RX ORDER — TRAZODONE HYDROCHLORIDE 50 MG/1
50 TABLET ORAL NIGHTLY
Status: DISCONTINUED | OUTPATIENT
Start: 2022-01-20 | End: 2022-01-20

## 2022-01-20 RX ADMIN — SERTRALINE 50 MG: 50 TABLET, FILM COATED ORAL at 09:48

## 2022-01-20 RX ADMIN — Medication 10 ML: at 23:22

## 2022-01-20 RX ADMIN — ACETAMINOPHEN 650 MG: 325 TABLET ORAL at 16:32

## 2022-01-20 RX ADMIN — Medication 10 ML: at 09:46

## 2022-01-20 RX ADMIN — DEXAMETHASONE 6 MG: 4 TABLET ORAL at 09:47

## 2022-01-20 RX ADMIN — QUETIAPINE FUMARATE 300 MG: 300 TABLET ORAL at 23:21

## 2022-01-20 RX ADMIN — OXYCODONE HYDROCHLORIDE AND ACETAMINOPHEN 500 MG: 500 TABLET ORAL at 09:48

## 2022-01-20 RX ADMIN — CYANOCOBALAMIN TAB 1000 MCG 1000 MCG: 1000 TAB at 09:48

## 2022-01-20 RX ADMIN — LACTULOSE 20 G: 10 SOLUTION ORAL at 18:14

## 2022-01-20 RX ADMIN — ZINC SULFATE 220 MG (50 MG) CAPSULE 50 MG: CAPSULE at 09:48

## 2022-01-20 RX ADMIN — ENOXAPARIN SODIUM 30 MG: 100 INJECTION SUBCUTANEOUS at 22:20

## 2022-01-20 RX ADMIN — GADOTERIDOL 15 ML: 279.3 INJECTION, SOLUTION INTRAVENOUS at 20:53

## 2022-01-20 RX ADMIN — Medication 2000 UNITS: at 09:48

## 2022-01-20 RX ADMIN — DILTIAZEM HYDROCHLORIDE 120 MG: 120 CAPSULE, COATED, EXTENDED RELEASE ORAL at 09:48

## 2022-01-20 RX ADMIN — CALCIUM CARBONATE 500 MG: 500 TABLET, CHEWABLE ORAL at 09:48

## 2022-01-20 RX ADMIN — ENOXAPARIN SODIUM 30 MG: 100 INJECTION SUBCUTANEOUS at 09:47

## 2022-01-20 RX ADMIN — LACTULOSE 20 G: 10 SOLUTION ORAL at 11:32

## 2022-01-20 ASSESSMENT — PAIN SCALES - GENERAL
PAINLEVEL_OUTOF10: 0
PAINLEVEL_OUTOF10: 3
PAINLEVEL_OUTOF10: 0

## 2022-01-20 NOTE — PROGRESS NOTES
INPATIENT CARDIOLOGY FOLLOW-UP    Name: Cain Jimenez    Age: 63 y.o. Date of Admission: 1/18/2022  4:40 PM    Date of Service: 1/20/2022    Primary Cardiologist: Joseph Peñaloza MD in [place], [place]    Chief Complaint: Follow-up for SVT    Interim History:  No further episodes of SVT. Feels better. Denies chest pain or shortness of breath.   On 2 L    Review of Systems:   Negative except as described above    Problem List:  Patient Active Problem List   Diagnosis    Anemia    Arthritis     chronic Alcohol abuse    Hepatitis C, chronic    Hepatitis C virus infection without hepatic coma    Serum ammonia increased ([address]    Major depression ([address]    Alcoholic cirrhosis ([address]    Cirrhosis of liver ([address]    Hepatitis C    Constipation    Bipolar disorder ([address]    Alcohol dependence ([address]    Alcoholic cirrhosis of liver without ascites ([address]    S/P TIPS (transjugular intrahepatic portosystemic shunt)    Chronic pancreatitis ([address]    PAF (paroxysmal atrial fibrillation) ([address]    Abnormal echocardiogram    Pulmonary HTN ([address]    Chronic obstructive pulmonary disease ([address]    Right ventricular enlargement    Right ventricular dysfunction    Status post placement of implantable loop recorder    Encounter for loop recorder check    Nonrheumatic mitral valve regurgitation    LVH (left ventricular hypertrophy)    Atrial enlargement, bilateral    Tobacco abuse    History of alcohol abuse    Thrombocytopenia (HCC)    Dyspnea on exertion    COVID-19       Current Medications:    Current Facility-Administered Medications:     dexamethasone (DECADRON) tablet 6 mg, 6 mg, Oral, Daily, Juanjose Guardado DO    sertraline (ZOLOFT) tablet 50 mg, 50 mg, Oral, Daily, Juanjose Guardado DO    traZODone (DESYREL) tablet 50 mg, 50 mg, Oral, Nightly, Juanjose Guardado DO    lactulose (CHRONULAC) 10 GM/15ML solution 20 g, 20 g, Oral, 4 times per day, Juanjose Guardado DO    calcium carbonate (TUMS) chewable tablet 500 mg, 500 mg, Oral, Daily, Juliet Peeling, DO, 500 mg at 22 133    vitamin D (CHOLECALCIFEROL) tablet 2,000 Units, 2,000 Units, Oral, Daily, Juliet Peeling, DO, 2,000 Units at 22 133    oxyCODONE-acetaminophen (PERCOCET) 5-325 MG per tablet 1 tablet, 1 tablet, Oral, Q4H PRN, Juliet Peeling, DO    QUEtiapine (SEROQUEL) tablet 300 mg, 300 mg, Oral, Nightly, Juliet Peeling, DO, 300 mg at 22    vitamin B-12 (CYANOCOBALAMIN) tablet 1,000 mcg, 1,000 mcg, Oral, Daily, Juliet Peeling, DO    sodium chloride flush 0.9 % injection 5-40 mL, 5-40 mL, IntraVENous, 2 times per day, Juliet Peeling, DO, 10 mL at 22    sodium chloride flush 0.9 % injection 5-40 mL, 5-40 mL, IntraVENous, PRN, Juliet Peeling, DO    0.9 % sodium chloride infusion, 25 mL, IntraVENous, PRN, Juliet Peeling, DO    enoxaparin (LOVENOX) injection 30 mg, 30 mg, SubCUTAneous, BID, Juliet Peeling, DO, 30 mg at 22    ondansetron (ZOFRAN-ODT) disintegrating tablet 4 mg, 4 mg, Oral, Q8H PRN **OR** ondansetron (ZOFRAN) injection 4 mg, 4 mg, IntraVENous, Q6H PRN, Juliet Peeling, DO    polyethylene glycol (GLYCOLAX) packet 17 g, 17 g, Oral, Daily PRN, Juliet Peeling, DO    acetaminophen (TYLENOL) tablet 650 mg, 650 mg, Oral, Q6H PRN **OR** acetaminophen (TYLENOL) suppository 650 mg, 650 mg, Rectal, Q6H PRN, Juliet Peeling, DO    ascorbic acid (VITAMIN C) tablet 500 mg, 500 mg, Oral, Daily, Juliet Peeling, DO, 500 mg at 22 133    zinc sulfate (ZINCATE) capsule 50 mg, 50 mg, Oral, Daily, Juliet Peeling, DO, 50 mg at 22 1337    guaiFENesin-dextromethorphan (ROBITUSSIN DM) 100-10 MG/5ML syrup 5 mL, 5 mL, Oral, Q4H PRN, Juliet Staples DO    dilTIAZem (CARDIZEM CD) extended release capsule 120 mg, 120 mg, Oral, Daily, Parris Apgar, MD, 120 mg at 22 1337    Physical Exam:  /60   Pulse 78   Temp 98.1 °F (36.7 °C) (Oral)   Resp 20   Ht 5' 8\" (1.727 m)   Wt 160 lb (72.6 kg) LMP 07/19/2006   SpO2 92%   BMI 24.33 kg/m²   Wt Readings from Last 3 Encounters:   01/18/22 160 lb (72.6 kg)   04/13/21 150 lb 6.4 oz (68.2 kg)   09/23/20 157 lb (71.2 kg)     Appearance: Awake, alert, no acute respiratory distress  Skin: Intact, no rash  Head: Normocephalic, atraumatic. Abnormal movements of the head  Eyes: EOMI, no conjunctival erythema  ENMT: No pharyngeal erythema, MMM, no rhinorrhea  Neck: Supple, no elevated JVP, no carotid bruits  Lungs: Clear to auscultation bilaterally. No wheezes, rales, or rhonchi. Cardiac: PMI nondisplaced, Regular rhythm with a normal rate, S1 & S2 normal, no murmurs. Loop recorder palpated over left chest  Abdomen: Soft, nontender, +bowel sounds  Extremities: Moves all extremities x 4, no lower extremity edema  Neurologic: No focal motor deficits apparent, normal mood and affect  Peripheral Pulses: Intact posterior tibial pulses bilaterally    Intake/Output:  No intake or output data in the 24 hours ending 01/20/22 0934  No intake/output data recorded.     Laboratory Tests:  Recent Labs     01/18/22  1201 01/20/22  0638    144   K 3.9 4.0   * 113*   CO2 21* 19*   BUN 13 15   CREATININE 0.7 0.8   GLUCOSE 102* 96   CALCIUM 8.9 9.0     Lab Results   Component Value Date    MG 1.7 01/19/2022     Recent Labs     01/18/22  1201 01/20/22  0638   ALKPHOS 201* 208*   ALT 20 21   AST 36* 45*   PROT 6.1* 6.1*   BILITOT 1.6* 1.8*   LABALBU 3.2* 2.9*     Recent Labs     01/18/22  1201   WBC 5.3   RBC 3.91   HGB 12.2   HCT 37.6   MCV 96.2   MCH 31.2   MCHC 32.4   RDW 16.1*   *   MPV 11.2     Lab Results   Component Value Date    CKTOTAL 39 01/05/2013    CKMB 0.2 01/05/2013    TROPONINI <0.01 01/04/2013    TROPONINI <0.01 01/04/2013    TROPONINI <0.01 01/04/2013     Lab Results   Component Value Date    INR 1.3 01/19/2022    INR 1.2 03/08/2018    INR 1.1 12/05/2016    PROTIME 14.3 (H) 01/19/2022    PROTIME 12.0 03/08/2018    PROTIME 11.4 12/05/2016     Lab Results   Component Value Date    TSH 1.830 2022     Lab Results   Component Value Date    LABA1C 4.3 2022     Lab Results   Component Value Date    EAG 94 02/15/2018     Lab Results   Component Value Date    CHOL 138 2022    CHOL 183 2015    CHOL 192 2014     Lab Results   Component Value Date    TRIG 88 2022    TRIG 96 2015    TRIG 198 2014     Lab Results   Component Value Date    HDL 65 2022    HDL 66 02/15/2018    HDL 77 2015     Lab Results   Component Value Date    LDLCALC 55 2022    LDLCALC 86 2014    LDLCHOLESTEROL 81 02/15/2018    LDLCHOLESTEROL 87 2015    LDLCHOLESTEROL 48 2013     Lab Results   Component Value Date    LABVLDL 18 2022    VLDL NOT REPORTED 02/15/2018    VLDL NOT REPORTED 2015    VLDL NOT REPORTED 2013     Lab Results   Component Value Date    CHOLHDLRATIO 2.5 02/15/2018    CHOLHDLRATIO 2.4 2015    CHOLHDLRATIO 2.9 2014     No results for input(s): PROBNP in the last 72 hours. Cardiac Tests:    EK2022: Sinus rhythm 90 beats minute with PACs    2022 at 1151: SVT at 144 bpm..  Narrow complex. Appears to be long RP. Right axis. Nonspecific ST changes. Telemetry: Sinus rhythm 70s with PACs    Chest X-ray:   22      Impression   1. Bilateral lower lobe pneumonia   2. 1 cm opacity overlying the right 6th rib.  Dedicated CT of the thorax is   recommended for further evaluation. Echocardiogram:   Transthoracic echo 2020    · Normal left ventricular size with normal systolic function. EF 55-60%.     · Mild left ventricular hypertrophy. · Moderate left atrial enlargement. Mild right atrial enlargement. · Thickened mitral valve. Mild to moderate mitral regurgitation. · Mild tricuspid regurgitation. Mild to moderately elevated right heart pressures, RVSP 41 mmHg. · Mild pulmonic insufficiency. · Aneurysmal atrial septum.    · Left ventricular diastolic dysfunction. · Compared to previous echo 3/19.  RVSP has increased. Stress test:    2/20/2020  IMPRESSION:   Compared to prior study 2/19 (exericise 80% PMHR), no significant changes are noted.     EKG:  No Ischemic EKG Changes. No arrhythmia noted during test.   IMAGING: Ischemia is present. No prior infarct seen. .  The left ventricular ejection fraction was normal and measured 61%. Left ventricular wall motion was normal.   SYMPTOMS: The patient complained of chest pain which spontaneously resolved in recovery. She experienced  no other symptoms  during the infusion. Cardiac catheterization:    ----------------------------------------------------------------------------------------------------------------------------------------------------------------  IMPRESSION:  1. Paroxysmal SVT. Appears to be long RP, likely atrial tachycardia, versus atrial flutter versus other supraventricular arrhythmia. Converted after adenosine administration  2. COVID-19 pneumonia  3. History of paroxysmal A. fib. Low burden on loop recorder. [id number] 1 (female) not previously on anticoagulation  4. History of loop recorder implant  5. History of abnormal stress test 2/2020. Currently denying chest pain  6. Pulmonary hypertension. ? Portopulmonary hypertension or other etiology  7. Mild to moderate MR, mild TR  8. Alcoholic cirrhosis of the liver, hepatitis C, status post TIPS  9. Thrombocytopenia  10. Bipolar disorder  11. Alcohol abuse  12. COPD      RECOMMENDATIONS:  Maintaining sinus since she converted yesterday.  Continue oral diltiazem   Maintain electrolyte replacement   Monitor telemetry   Further care per primary service   Aggressive risk factor modification    Cindy Rodriguez MD, [facility]    NOTE: This report was transcribed using voice recognition software.  Every effort was made to ensure accuracy; however, inadvertent computerized transcription errors may be present.

## 2022-01-20 NOTE — PROGRESS NOTES
Hospitalist Progress Note      SYNOPSIS: Patient admitted on 2022  who  has a past medical history of Alcohol abuse, unspecified, Alcoholic cirrhosis of liver ([address] Anxiety, Arrhythmia, Asthma, Bipolar disorder ([address] Cirrhosis of liver ([address] Constipation, COPD (chronic obstructive pulmonary disease) ([address] Depression, GERD (gastroesophageal reflux disease), Hepatic encephalopathy ([address] Hepatitis C, Pancreatitis, alcoholic, Pulmonary hypertension ([address] and Seizures ([address]    Robin L Chevalier is a 63 y.o. female history of alcohol abuse coming from a rehab facility for fatigue for the last 3 days.  Patient's facility is concerned that the patient has had confusion and strange head movements. Gordon Wagner think that the patient is \"drinking. \" Gordon Wagner are concerned for confusion and they did not want her to take any of her medications due to her mental status.  Per patient she states that she has been tired for the last 3 days. Mateusz Hannah has had decreased appetite for the last 2 days. Mateusz Hannah is adamant that she has not been drinking.  Denies any recent fevers, chills, nausea, vomiting, chest pain, shortness of breath, abdominal pain, flank pain, dysuria, hematuria, diarrhea, constipation, new rashes or sores.     Vital signs within normal limits and stable. Patient displaying dystonic movements of head. Possible tardive dyskinesia. Laboratory studies were unremarkable. COVID-19 positive. Rehab is unable to take patient back testing positive for COVID-19. Medicine consulted for admission.  Patient went into RVR vs SVT, cardiology consulted    SUBJECTIVE:  Stable overnight. No other overnight issues reported. Patient seen and examined  Records reviewed. Feeling better today  Says she wears 2L O2 only at night  Still with tardive dyskinesia        Temp (24hrs), Av.3 °F (36.8 °C), Min:98.1 °F (36.7 °C), Max:98.4 °F (36.9 °C)    DIET: ADULT DIET;  Regular  CODE: Full Code  No intake or output data in the 24 hours ending 01/20/22 0912    Review of Systems  All bolded are positive; please see HPI  General:  Fever, chills, diaphoresis, fatigue, malaise, night sweats, weight loss  Psychological:  Anxiety, disorientation, hallucinations. ENT:  Epistaxis, headaches, vertigo, visual changes. Cardiovascular:  Chest pain, irregular heartbeats, palpitations, paroxysmal nocturnal dyspnea. Respiratory:  Shortness of breath, coughing, sputum production, hemoptysis, wheezing, orthopnea. Gastrointestinal:  Nausea, vomiting, diarrhea, heartburn, constipation, abdominal pain, hematemesis, hematochezia, melena, acholic stools  Genito-Urinary:  Dysuria, urgency, frequency, hematuria  Musculoskeletal:  Joint pain, joint stiffness, joint swelling, muscle pain  Neurology:  Headache, focal neurological deficits, weakness, numbness, paresthesia  Derm:  Rashes, ulcers, excoriations, bruising  Extremities:  Decreased ROM, peripheral edema, mottling      OBJECTIVE:    /60   Pulse 78   Temp 98.1 °F (36.7 °C) (Oral)   Resp 20   Ht 5' 8\" (1.727 m)   Wt 160 lb (72.6 kg)   LMP 07/19/2006   SpO2 92%   BMI 24.33 kg/m²     General appearance:  awake, alert, and oriented to person, place, time, and purpose; appears stated age and cooperative; no apparent distress no labored breathing  HEENT:  Conjunctivae/corneas clear. Neck: Supple. No jugular venous distention. Respiratory: symmetrical; clear to auscultation bilaterally; no wheezes; no rhonchi; no rales  Cardiovascular: rhythm regular;regular rhythm; no murmurs  Abdomen: Soft, nontender, nondistended  Extremities:  peripheral pulses present; no peripheral edema; no ulcers  Musculoskeletal: No clubbing, cyanosis, no bilateral lower extremity edema. Brisk capillary refill.    Skin:  No rashes  on visible skin  Neurologic: awake, alert and following commands     ASSESSMENT and PLAN:  · COVID-19 infection  · Fatigue  · SVT/Possibly atrial flutter versus other supraventricular arrhythmia. She states she has a history of afib and is on cardizem. RRT called 1/19, given cardizem, adenosine. Maintain K > 4.0 and mag > 2.0, supplementation ordered. CTA no PE  · Paroxysmal afib- low burden on loop recorder. · Tardive dyskinesia- Tardive dyskinesia is a chronic condition which can be managed by outpatient mental health facility a psych eval is also able to be conducted on an outpatient basis. Will recommend that  refer patient to the outpatient mental health agency at the time of discharge.  Stop sertraline, trazodone  · COPD  · Bipolar 1 disorder  · History of alcohol abuse- she states she has not drank for 30 days  · Pulmonary hypertension  · Thrombocytopenia  · Alcoholic cirrhosis of the liver, hepatitis C- status post TIPS          Medications:  REVIEWED DAILY    Infusion Medications    sodium chloride       Scheduled Medications    calcium carbonate  500 mg Oral Daily    vitamin D  2,000 Units Oral Daily    QUEtiapine  300 mg Oral Nightly    vitamin B-12  1,000 mcg Oral Daily    sodium chloride flush  5-40 mL IntraVENous 2 times per day    enoxaparin  30 mg SubCUTAneous BID    ascorbic acid  500 mg Oral Daily    zinc sulfate  50 mg Oral Daily    dilTIAZem  120 mg Oral Daily     PRN Meds: oxyCODONE-acetaminophen, sodium chloride flush, sodium chloride, ondansetron **OR** ondansetron, polyethylene glycol, acetaminophen **OR** acetaminophen, guaiFENesin-dextromethorphan    Labs:     Recent Labs     01/18/22  1201   WBC 5.3   HGB 12.2   HCT 37.6   *       Recent Labs     01/18/22  1201 01/19/22  0826 01/20/22  0638     --  144   K 3.9  --  4.0   *  --  113*   CO2 21*  --  19*   BUN 13  --  15   CREATININE 0.7  --  0.8   CALCIUM 8.9  --  9.0   PHOS  --  3.7  --        Recent Labs     01/18/22  1201 01/20/22  0638   PROT 6.1* 6.1*   ALKPHOS 201* 208*   ALT 20 21   AST 36* 45*   BILITOT 1.6* 1.8*   LIPASE 48  --        Recent Labs     01/19/22  0826   INR 1.3       No results for input(s): Judith Ramirez in the last 72 hours. Chronic labs:    Lab Results   Component Value Date    CHOL 138 01/19/2022    TRIG 88 01/19/2022    HDL 65 01/19/2022    LDLCALC 55 01/19/2022    TSH 1.830 01/19/2022    INR 1.3 01/19/2022    LABA1C 4.3 01/19/2022       Radiology: REVIEWED DAILY    +++++++++++++++++++++++++++++++++++++++++++++++++  DO Eliseo Fleming Physician - [address] [place], [place]  +++++++++++++++++++++++++++++++++++++++++++++++++  NOTE: This report was transcribed using voice recognition software. Every effort was made to ensure accuracy; however, inadvertent computerized transcription errors may be present.

## 2022-01-20 NOTE — PROGRESS NOTES
Luly Martinez is a 63 y.o. right handed female     Patient with a history of seizures, hepatic encephalopathy, alcohol abuse with cirrhosis of the liver, bipolar disorder, pancreatitis, COPD who was admitted to [facility] on 1/18/2022 for fatigue. She was diagnosed with COVID-19 which subsequently was positive. 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 Neurology was consulted and on exam noted brisk reflexes and sustained clonus in the left ankle. There is no Babinski appreciated or Dusty's appreciated  She also demonstrated head movements consistent with possible tardive dyskinesia versus cervical dystonia  Patient denies any knowledge of prior issues with head movements or mouthing movements. She denies any difficulty walking or cramping he has any new focal weakness    No chest pain or palpitations  No SOB  No vertigo, lightheadedness or loss of consciousness  No falls, tripping or stumbling  No incontinence of bowels or bladder  No itching or bruising appreciated  ROS otherwise negative     Prior to Visit Medications    Medication Sig Taking?  Authorizing Provider   sertraline (ZOLOFT) 50 MG tablet Take 50 mg by mouth daily Yes Historical Provider, MD   traZODone (DESYREL) 50 MG tablet Take 50 mg by mouth nightly Yes Historical Provider, MD   OXYGEN Inhale into the lungs  Historical Provider, MD   lactulose (CHRONULAC) 10 GM/15ML solution take 30 milliliters by mouth four times a day  Nelda Greenberg MD   naproxen (NAPROSYN) 500 MG tablet Take 1 tablet by mouth 2 times daily (with meals) for 15 days  Patient not taking: Reported on 4/13/2021  OLYA Dominguez CNP   ibuprofen (ADVIL;MOTRIN) 800 MG tablet Take 1 tablet by mouth every 8 hours as needed for Pain  OLYA Jones CNP   calcium carbonate 600 MG TABS tablet Take 1 tablet by mouth daily  Historical Provider, MD   vitamin B-12 (CYANOCOBALAMIN) 1000 MCG tablet Take 1,000 mcg by mouth daily  Historical Provider, MD   QUEtiapine (SEROQUEL) 300 MG tablet Take 1 tablet by mouth nightly.   Kristen Alexandra MD       Allergies as of 01/18/2022 - Fully Reviewed 01/18/2022   Allergen Reaction Noted    Chantix [varenicline] Other (See Comments) 07/19/2018    Penicillins  12/02/2011       Objective:     /60   Pulse 78   Temp 98.1 °F (36.7 °C) (Oral)   Resp 20   Ht 5' 8\" (1.727 m)   Wt 160 lb (72.6 kg)   LMP 07/19/2006   SpO2 92%   BMI 24.33 kg/m²      General appearance: alert, appears stated age and cooperative  Head: Normocephalic, without obvious abnormality, atraumatic  Extremities: no cyanosis or edema  Pulses: 2+ and symmetric  Skin: no rashes or lesions    Mental Status: Alert, oriented, thought content appropriate    Speech: Minimally dysarthric but edentulous  Language: appropriate    No mouthing movements or cervical movements appreciated    Cranial Nerves:  I: smell    II: visual acuity     II: visual fields Full   II: pupils JOSEFINA   III,VII: ptosis None   III,IV,VI: extraocular muscles  EOMI without nystagmus    V: mastication Normal   V: facial light touch sensation  Normal   V,VII: corneal reflex  Present   VII: facial muscle function - upper     VII: facial muscle function - lower Normal   VIII: hearing Normal   IX: soft palate elevation  Normal   IX,X: gag reflex    XI: trapezius strength  5/5   XI: sternocleidomastoid strength 5/5   XI: neck extension strength  5/5   XII: tongue strength  Normal     Motor:  5/5 throughout  Normal bulk and tone    Sensory:  Normal to LT     Coordination:   FN, FFM and DAHIANA symmetrical      DTR:   Right Brachioradialis reflex 2+  Left Brachioradialis reflex 2+  Right Biceps reflex 3+  Left Biceps reflex 3+  Right Quadriceps reflex 3+  Left Quadriceps reflex 3+  Right Achilles reflex 3+ unsustained clonus  Left Achilles reflex 3+ unsustained clonus    No Babinski    Bilateral Masisel's     Laboratory/Radiology:     CBC with Differential:    Lab Results   Component Value Date    WBC 3.7 01/20/2022    RBC 4.06 01/20/2022    RBC 4.36 01/24/2012    HGB 12.7 01/20/2022    HCT 40.9 01/20/2022    PLT 93 01/20/2022    PLT 80 01/24/2012    .7 01/20/2022    MCH 31.3 01/20/2022    MCHC 31.1 01/20/2022    RDW 17.0 01/20/2022    LYMPHOPCT 19.5 01/18/2022    MONOPCT 11.6 01/18/2022    EOSPCT 12.1 12/15/2015    BASOPCT 0.6 01/18/2022    MONOSABS 0.61 01/18/2022    LYMPHSABS 1.03 01/18/2022    EOSABS 0.15 01/18/2022    BASOSABS 0.03 01/18/2022    DIFFTYPE NOT REPORTED 04/13/2021     CMP:    Lab Results   Component Value Date     01/20/2022    K 4.0 01/20/2022     01/20/2022    CO2 19 01/20/2022    BUN 15 01/20/2022    CREATININE 0.8 01/20/2022    GFRAA >60 01/20/2022    LABGLOM >60 01/20/2022    GLUCOSE 96 01/20/2022    GLUCOSE 137 06/07/2012    PROT 6.1 01/20/2022    PROT 8.0 01/08/2021    LABALBU 2.9 01/20/2022    LABALBU 3.2 06/07/2012    CALCIUM 9.0 01/20/2022    BILITOT 1.8 01/20/2022    ALKPHOS 208 01/20/2022    AST 45 01/20/2022    ALT 21 01/20/2022       CT Head:  1.  No acute intracranial abnormality. 2. 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 3. 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      I personally reviewed the patient's lab and imaging studies at this time. Assessment:     On exam I appreciate unsustained clonus in both ankles as well as bilateral Dusty's which quite possibly could be a normal variant   In the setting of a Sierra cisterna magna versus arachnoid cyst her exam findings could be a normal variant however further diagnostic testing should ensue    At present time, I do not appreciate any cervical dystonia or tardive dyskinesia however given her history of psychiatric medications this could be an underlying cause for the above previously visualized difficulties      Plan:      At this time, we will obtain an MRI of her brain and cervical spine to rule out any intracranial or cervical difficulties which may precipitate her hyperreflexia as well as Rankin's sign    Neurologically she appears quite stable      OLYA Hutchinson  1:43 PM  1/20/2022

## 2022-01-20 NOTE — CARE COORDINATION
Care Coordination  Await pt ot peterson and I called the patients sister Alex Hancock the patients sister @ [phone] and asked if a skilled facility is needed she will need to go to a skilled facility in [facility], [place], operated by [facility] near [facility] and she gave me this reference [place] phone is [phone].  I will await the evals to be done

## 2022-01-20 NOTE — PROGRESS NOTES
RN updated patient's sister, Orlando Pires, regarding patient progress and plan of care. Educated Orlando Pires on family call times per unit protocol.

## 2022-01-21 LAB
ALBUMIN SERPL-MCNC: 3.1 G/DL (ref 3.5–5.2)
ALP BLD-CCNC: 248 U/L (ref 35–104)
ALT SERPL-CCNC: 32 U/L (ref 0–32)
AMMONIA: 50 UMOL/L (ref 11–51)
ANION GAP SERPL CALCULATED.3IONS-SCNC: 13 MMOL/L (ref 7–16)
AST SERPL-CCNC: 62 U/L (ref 0–31)
BILIRUB SERPL-MCNC: 1.5 MG/DL (ref 0–1.2)
BUN BLDV-MCNC: 18 MG/DL (ref 6–23)
CALCIUM SERPL-MCNC: 9.1 MG/DL (ref 8.6–10.2)
CHLORIDE BLD-SCNC: 109 MMOL/L (ref 98–107)
CO2: 17 MMOL/L (ref 22–29)
CREAT SERPL-MCNC: 0.7 MG/DL (ref 0.5–1)
GFR AFRICAN AMERICAN: >60
GFR NON-AFRICAN AMERICAN: >60 ML/MIN/1.73
GLUCOSE BLD-MCNC: 182 MG/DL (ref 74–99)
HCT VFR BLD CALC: 41.7 % (ref 34–48)
HEMOGLOBIN: 13.9 G/DL (ref 11.5–15.5)
MAGNESIUM: 1.7 MG/DL (ref 1.6–2.6)
MCH RBC QN AUTO: 31.2 PG (ref 26–35)
MCHC RBC AUTO-ENTMCNC: 33.3 % (ref 32–34.5)
MCV RBC AUTO: 93.7 FL (ref 80–99.9)
PDW BLD-RTO: 15.9 FL (ref 11.5–15)
PLATELET # BLD: 90 E9/L (ref 130–450)
PLATELET CONFIRMATION: NORMAL
PMV BLD AUTO: 10.1 FL (ref 7–12)
POTASSIUM SERPL-SCNC: 3.9 MMOL/L (ref 3.5–5)
RBC # BLD: 4.45 E12/L (ref 3.5–5.5)
SODIUM BLD-SCNC: 139 MMOL/L (ref 132–146)
TOTAL PROTEIN: 6.4 G/DL (ref 6.4–8.3)
WBC # BLD: 9.2 E9/L (ref 4.5–11.5)

## 2022-01-21 PROCEDURE — 99232 SBSQ HOSP IP/OBS MODERATE 35: CPT | Performed by: INTERNAL MEDICINE

## 2022-01-21 PROCEDURE — 6370000000 HC RX 637 (ALT 250 FOR IP): Performed by: FAMILY MEDICINE

## 2022-01-21 PROCEDURE — 97535 SELF CARE MNGMENT TRAINING: CPT

## 2022-01-21 PROCEDURE — 80053 COMPREHEN METABOLIC PANEL: CPT

## 2022-01-21 PROCEDURE — 82140 ASSAY OF AMMONIA: CPT

## 2022-01-21 PROCEDURE — 85027 COMPLETE CBC AUTOMATED: CPT

## 2022-01-21 PROCEDURE — 2580000003 HC RX 258: Performed by: FAMILY MEDICINE

## 2022-01-21 PROCEDURE — 6360000002 HC RX W HCPCS: Performed by: FAMILY MEDICINE

## 2022-01-21 PROCEDURE — 97161 PT EVAL LOW COMPLEX 20 MIN: CPT

## 2022-01-21 PROCEDURE — 97165 OT EVAL LOW COMPLEX 30 MIN: CPT

## 2022-01-21 PROCEDURE — 6360000002 HC RX W HCPCS: Performed by: INTERNAL MEDICINE

## 2022-01-21 PROCEDURE — 1200000000 HC SEMI PRIVATE

## 2022-01-21 PROCEDURE — 83735 ASSAY OF MAGNESIUM: CPT

## 2022-01-21 PROCEDURE — 6370000000 HC RX 637 (ALT 250 FOR IP): Performed by: INTERNAL MEDICINE

## 2022-01-21 PROCEDURE — 36415 COLL VENOUS BLD VENIPUNCTURE: CPT

## 2022-01-21 PROCEDURE — 97530 THERAPEUTIC ACTIVITIES: CPT

## 2022-01-21 PROCEDURE — 2700000000 HC OXYGEN THERAPY PER DAY

## 2022-01-21 RX ORDER — DILTIAZEM HYDROCHLORIDE 180 MG/1
180 CAPSULE, COATED, EXTENDED RELEASE ORAL DAILY
Status: DISCONTINUED | OUTPATIENT
Start: 2022-01-21 | End: 2022-01-22 | Stop reason: HOSPADM

## 2022-01-21 RX ORDER — DEXAMETHASONE 6 MG/1
6 TABLET ORAL DAILY
Qty: 8 TABLET | Refills: 0 | Status: SHIPPED | OUTPATIENT
Start: 2022-01-22 | End: 2022-01-30

## 2022-01-21 RX ORDER — MAGNESIUM OXIDE 400 MG/1
400 TABLET ORAL DAILY
Qty: 30 TABLET | Refills: 0 | Status: SHIPPED | OUTPATIENT
Start: 2022-01-21 | End: 2022-01-21

## 2022-01-21 RX ORDER — MAGNESIUM OXIDE 400 MG/1
400 TABLET ORAL DAILY
Qty: 30 TABLET | Refills: 0 | Status: SHIPPED | OUTPATIENT
Start: 2022-01-21

## 2022-01-21 RX ORDER — MAGNESIUM SULFATE IN WATER 40 MG/ML
2000 INJECTION, SOLUTION INTRAVENOUS ONCE
Status: DISCONTINUED | OUTPATIENT
Start: 2022-01-21 | End: 2022-01-21

## 2022-01-21 RX ORDER — POTASSIUM CHLORIDE 20 MEQ/1
40 TABLET, EXTENDED RELEASE ORAL ONCE
Status: COMPLETED | OUTPATIENT
Start: 2022-01-21 | End: 2022-01-21

## 2022-01-21 RX ORDER — POTASSIUM CHLORIDE 20 MEQ/1
20 TABLET, EXTENDED RELEASE ORAL DAILY
Qty: 30 TABLET | Refills: 0 | Status: SHIPPED | OUTPATIENT
Start: 2022-01-21 | End: 2022-02-20

## 2022-01-21 RX ORDER — DILTIAZEM HYDROCHLORIDE 180 MG/1
180 CAPSULE, COATED, EXTENDED RELEASE ORAL DAILY
Qty: 30 CAPSULE | Refills: 0 | Status: SHIPPED | OUTPATIENT
Start: 2022-01-22

## 2022-01-21 RX ADMIN — ACETAMINOPHEN 650 MG: 325 TABLET ORAL at 09:21

## 2022-01-21 RX ADMIN — Medication 400 MG: at 21:11

## 2022-01-21 RX ADMIN — LACTULOSE 20 G: 10 SOLUTION ORAL at 13:00

## 2022-01-21 RX ADMIN — OXYCODONE HYDROCHLORIDE AND ACETAMINOPHEN 500 MG: 500 TABLET ORAL at 09:21

## 2022-01-21 RX ADMIN — CYANOCOBALAMIN TAB 1000 MCG 1000 MCG: 1000 TAB at 09:21

## 2022-01-21 RX ADMIN — QUETIAPINE FUMARATE 300 MG: 300 TABLET ORAL at 21:11

## 2022-01-21 RX ADMIN — LACTULOSE 20 G: 10 SOLUTION ORAL at 21:11

## 2022-01-21 RX ADMIN — Medication 10 ML: at 09:35

## 2022-01-21 RX ADMIN — LACTULOSE 20 G: 10 SOLUTION ORAL at 00:24

## 2022-01-21 RX ADMIN — ENOXAPARIN SODIUM 30 MG: 100 INJECTION SUBCUTANEOUS at 09:20

## 2022-01-21 RX ADMIN — DILTIAZEM HYDROCHLORIDE 180 MG: 180 CAPSULE, EXTENDED RELEASE ORAL at 14:00

## 2022-01-21 RX ADMIN — CALCIUM CARBONATE 500 MG: 500 TABLET, CHEWABLE ORAL at 09:21

## 2022-01-21 RX ADMIN — ZINC SULFATE 220 MG (50 MG) CAPSULE 50 MG: CAPSULE at 09:21

## 2022-01-21 RX ADMIN — DEXAMETHASONE 6 MG: 4 TABLET ORAL at 09:21

## 2022-01-21 RX ADMIN — ENOXAPARIN SODIUM 30 MG: 100 INJECTION SUBCUTANEOUS at 21:11

## 2022-01-21 RX ADMIN — LACTULOSE 20 G: 10 SOLUTION ORAL at 05:49

## 2022-01-21 RX ADMIN — Medication 2000 UNITS: at 09:21

## 2022-01-21 RX ADMIN — POTASSIUM CHLORIDE 40 MEQ: 20 TABLET, EXTENDED RELEASE ORAL at 09:35

## 2022-01-21 ASSESSMENT — PAIN SCALES - GENERAL
PAINLEVEL_OUTOF10: 5
PAINLEVEL_OUTOF10: 0
PAINLEVEL_OUTOF10: 5
PAINLEVEL_OUTOF10: 0

## 2022-01-21 ASSESSMENT — PAIN DESCRIPTION - LOCATION: LOCATION: GENERALIZED

## 2022-01-21 NOTE — PROGRESS NOTES
Hospitalist Progress Note      SYNOPSIS: Patient admitted on 2022  who  has a past medical history of Alcohol abuse, unspecified, Alcoholic cirrhosis of liver ([address] Anxiety, Arrhythmia, Asthma, Bipolar disorder ([address] Cirrhosis of liver ([address] Constipation, COPD (chronic obstructive pulmonary disease) ([address] Depression, GERD (gastroesophageal reflux disease), Hepatic encephalopathy ([address] Hepatitis C, Pancreatitis, alcoholic, Pulmonary hypertension ([address] and Seizures ([address]    Robin L Chevalier is a 63 y.o. female history of alcohol abuse coming from a rehab facility for fatigue for the last 3 days.  Patient's facility is concerned that the patient has had confusion and strange head movements. Blake Rojas think that the patient is \"drinking. \" [facility] Mowers are concerned for confusion and they did not want her to take any of her medications due to her mental status.  Per patient she states that she has been tired for the last 3 days. Rayna Hinojosa has had decreased appetite for the last 2 days. Rayna Hinojosa is adamant that she has not been drinking.  Denies any recent fevers, chills, nausea, vomiting, chest pain, shortness of breath, abdominal pain, flank pain, dysuria, hematuria, diarrhea, constipation, new rashes or sores.     Vital signs within normal limits and stable. Patient displaying dystonic movements of head. Possible tardive dyskinesia. Laboratory studies were unremarkable. COVID-19 positive. Rehab is unable to take patient back testing positive for COVID-19. Medicine consulted for admission.  Patient went into RVR vs SVT, cardiology consulted    SUBJECTIVE:  Stable overnight. No other overnight issues reported. Patient seen and examined  Records reviewed.    Feeling better today  Says she wears 2L O2 only at night  Still with tardive dyskinesia,mild  She is on room air  No complaints  Asking to go back to inpatient alcohol rehab in [facility] (24hrs), Av °F (36.7 °C), Min:97.8 °F (36.6 °C), Max:98.2 °F (36.8 °C)    DIET: ADULT DIET; Regular  CODE: Full Code    Intake/Output Summary (Last 24 hours) at 1/21/2022 0810  Last data filed at 1/20/2022 1822  Gross per 24 hour   Intake 480 ml   Output 0 ml   Net 480 ml       Review of Systems  All bolded are positive; please see HPI  General:  Fever, chills, diaphoresis, fatigue, malaise, night sweats, weight loss  Psychological:  Anxiety, disorientation, hallucinations. ENT:  Epistaxis, headaches, vertigo, visual changes. Cardiovascular:  Chest pain, irregular heartbeats, palpitations, paroxysmal nocturnal dyspnea. Respiratory:  Shortness of breath, coughing, sputum production, hemoptysis, wheezing, orthopnea. Gastrointestinal:  Nausea, vomiting, diarrhea, heartburn, constipation, abdominal pain, hematemesis, hematochezia, melena, acholic stools  Genito-Urinary:  Dysuria, urgency, frequency, hematuria  Musculoskeletal:  Joint pain, joint stiffness, joint swelling, muscle pain  Neurology:  Headache, focal neurological deficits, weakness, numbness, paresthesia  Derm:  Rashes, ulcers, excoriations, bruising  Extremities:  Decreased ROM, peripheral edema, mottling      OBJECTIVE:    /77   Pulse 79   Temp 97.8 °F (36.6 °C) (Oral)   Resp 18   Ht 5' 8\" (1.727 m)   Wt 160 lb (72.6 kg)   LMP 07/19/2006   SpO2 95%   BMI 24.33 kg/m²     General appearance:  awake, alert, and oriented to person, place, time, and purpose; appears stated age and cooperative; no apparent distress no labored breathing  HEENT:  Conjunctivae/corneas clear. Neck: Supple. No jugular venous distention. Respiratory: symmetrical; clear to auscultation bilaterally; no wheezes; no rhonchi; no rales  Cardiovascular: rhythm regular;regular rhythm; no murmurs  Abdomen: Soft, nontender, nondistended  Extremities:  peripheral pulses present; no peripheral edema; no ulcers  Musculoskeletal: No clubbing, cyanosis, no bilateral lower extremity edema. Brisk capillary refill.    Skin:  No rashes  on visible skin  Neurologic: awake, alert and following commands     ASSESSMENT and PLAN:  · COVID-19 infection  · Fatigue  · SVT/Possibly atrial flutter versus other supraventricular arrhythmia. She states she has a history of afib and is on cardizem. RRT called 1/19, given cardizem, adenosine. Maintain K > 4.0 and mag > 2.0, supplementation ordered. CTA no PE  · Paroxysmal afib- low burden on loop recorder. · Tardive dyskinesia- Tardive dyskinesia is a chronic condition which can be managed by outpatient mental health facility a psych eval is also able to be conducted on an outpatient basis. Will recommend that  refer patient to the outpatient mental health agency at the time of discharge. Stop sertraline, trazodone  · COPD  · Bipolar 1 disorder  · History of alcohol abuse- she states she has not drank for 30 days  · Pulmonary hypertension  · Thrombocytopenia  · Alcoholic cirrhosis of the liver, hepatitis C- status post TIPS    Dc planning- ok for dc, attempting to set up for her to go back to alcohol rehab.       Medications:  REVIEWED DAILY    Infusion Medications    sodium chloride       Scheduled Medications    dexamethasone  6 mg Oral Daily    lactulose  20 g Oral 4 times per day    calcium carbonate  500 mg Oral Daily    vitamin D  2,000 Units Oral Daily    QUEtiapine  300 mg Oral Nightly    vitamin B-12  1,000 mcg Oral Daily    sodium chloride flush  5-40 mL IntraVENous 2 times per day    enoxaparin  30 mg SubCUTAneous BID    ascorbic acid  500 mg Oral Daily    zinc sulfate  50 mg Oral Daily    dilTIAZem  120 mg Oral Daily     PRN Meds: oxyCODONE-acetaminophen, sodium chloride flush, sodium chloride, ondansetron **OR** ondansetron, polyethylene glycol, acetaminophen **OR** acetaminophen, guaiFENesin-dextromethorphan    Labs:     Recent Labs     01/18/22  1201 01/20/22  0638 01/21/22  0436   WBC 5.3 3.7* 9.2   HGB 12.2 12.7 13.9   HCT 37.6 40.9 41.7   * 93* 90* Recent Labs     01/18/22  1201 01/19/22  0826 01/20/22  0638 01/21/22  0436     --  144 139   K 3.9  --  4.0 3.9   *  --  113* 109*   CO2 21*  --  19* 17*   BUN 13  --  15 18   CREATININE 0.7  --  0.8 0.7   CALCIUM 8.9  --  9.0 9.1   PHOS  --  3.7  --   --        Recent Labs     01/18/22  1201 01/20/22  0638 01/21/22  0436   PROT 6.1* 6.1* 6.4   ALKPHOS 201* 208* 248*   ALT 20 21 32   AST 36* 45* 62*   BILITOT 1.6* 1.8* 1.5*   LIPASE 48  --   --        Recent Labs     01/19/22  0826   INR 1.3       No results for input(s): CKTOTAL, TROPONINI in the last 72 hours. Chronic labs:    Lab Results   Component Value Date    CHOL 138 01/19/2022    TRIG 88 01/19/2022    HDL 65 01/19/2022    LDLCALC 55 01/19/2022    TSH 1.830 01/19/2022    INR 1.3 01/19/2022    LABA1C 4.3 01/19/2022       Radiology: REVIEWED DAILY    +++++++++++++++++++++++++++++++++++++++++++++++++  DariuszDO Eliseo Muñoz Physician - [address] [place], [place]  +++++++++++++++++++++++++++++++++++++++++++++++++  NOTE: This report was transcribed using voice recognition software. Every effort was made to ensure accuracy; however, inadvertent computerized transcription errors may be present.

## 2022-01-21 NOTE — PLAN OF CARE
from falls  Description: Will remain free from falls  Outcome: Met This Shift  Goal: Absence of physical injury  Description: Absence of physical injury  Outcome: Met This Shift

## 2022-01-21 NOTE — PROGRESS NOTES
Physical Therapy   Initial Assessment     Name: Cody Sanz  : 1960  MRN: [id number]      Date of Service: 2022    Evaluating PT:  Vince Navarrete. Donna Angel, [id number]    Room #:  1275/6248-F  Diagnosis:  Tardive dyskinesia [G24.01]  Generalized weakness [R53.1]  Urinary tract infection without hematuria, site unspecified [N39.0]  Pneumonia due to infectious organism, unspecified laterality, unspecified part of lung [J18.9]  COVID-19 [U07.1]  PMHx/PSHx:     has a past medical history of Alcohol abuse, unspecified, Alcoholic cirrhosis of liver ([address] Anxiety, Arrhythmia, Asthma, Bipolar disorder ([address] Cirrhosis of liver ([address] Constipation, COPD (chronic obstructive pulmonary disease) ([address] Depression, GERD (gastroesophageal reflux disease), Hepatic encephalopathy ([address] Hepatitis C, Pancreatitis, alcoholic, Pulmonary hypertension ([address] and Seizures ([address] has a past surgical history that includes Cholecystectomy; Hysterectomy;  section; Colonoscopy (14); Upper gastrointestinal endoscopy (14); Upper gastrointestinal endoscopy (2015); Knee arthroscopy (Left, 2016); ERCP (N/A, 3/8/2018); Insertable Cardiac Monitor (2018); Upper gastrointestinal endoscopy (N/A, 2019); Colonoscopy (N/A, 2019); and shoulder surgery (Left, 2019). Precautions:  Fall risk, confused, droplet+, dystonic head movements    SUBJECTIVE:    Pt is a questionable historian but the social history she provided is similar to that provided in the chart lives with her mother in a 1 story home with level entry. Pt ambulated with no AD PTA, reports she owns 2 walkers. OBJECTIVE:   Initial Evaluation  Date: 22 Treatment Short Term/ Long Term   Goals   AM-PAC 6 Clicks 58/22     Was pt agreeable to Eval/treatment? Yes     Does pt have pain?  Reported back pain for the last 10 days     Bed Mobility  Rolling: Supervision  Supine<>sit: Supervision  Scooting: Supervision  Independent Transfers Sit to stand: SBA  Stand to sit: SBA  Stand pivot: SBA  Independent   Ambulation    20 feet with WW with SBA  >150 feet with AAD with Modified [place]   Stair negotiation: ascended and descended  NT  >4 steps with 1 rail with Modified [place]er   BLE ROM WFL     BLE Strength Grossly 4/5     Balance Sitting: Independent  Standing: SBA  Independent     Pt is A & O x 1 to self only, did not know what state she was in  Sensation:  Denied numbness/tingling  Edema:  None noted in BLE    Therapeutic Exercises:    Ambulation: 2x20 feet with no AD with SBA  Ambulation: 2x20 feet with WW with SBA/Supervision  Sit<>stand x5 with SBA/Supervision    Patient education  Pt educated on bed mobility, transfer technique, need for a walker, benefits of gradual increases in activity. Patient response to education:   Pt verbalized understanding Pt demonstrated skill Pt requires further education in this area   Yes Yes Reinforce     ASSESSMENT:    Conditions Requiring Skilled Therapeutic Intervention:    [x]Decreased strength     []Decreased ROM  [x]Decreased functional mobility  [x]Decreased balance   [x]Decreased endurance   []Decreased posture  []Decreased sensation  []Decreased coordination   []Decreased vision  [x]Decreased safety awareness   []Increased pain       Comments: The pt was able to ambulate without physical assistance, however she demonstrated a B toe in gait pattern and did reach for the wall on occasion when walking without an AD, improved technique and safety with a walker. The pt repeated ambulation multiple times, distance limited by isolation precautions. The pt was left with nursing present, sitting up in a chair.      Treatment:  Patient practiced and was instructed in the following treatment:     Gait training: verbal cues for no AD sequencing and safety, verbal cues for appropriate step length and positioning within walker with dynamic activities   Transfer training: verbal cues for hand placement during sit to stand transfer and for anterior weight shift in order to facilitate initiation of the stand. Pt's/ family goals   1. To return home    Prognosis is good for reaching above PT goals. Patient and or family understand(s) diagnosis, prognosis, and plan of care. Unknown    PHYSICAL THERAPY PLAN OF CARE:    PT POC is established based on physician order and patient diagnosis     Referring provider/PT Order:  Josh eLon DO  Diagnosis:  Tardive dyskinesia [G24.01]  Generalized weakness [R53.1]  Urinary tract infection without hematuria, site unspecified [N39.0]  Pneumonia due to infectious organism, unspecified laterality, unspecified part of lung [J18.9]  COVID-19 [U07.1]  Specific instructions for next treatment:  Progress ambulation    Current Treatment Recommendations:     [x] Strengthening to improve independence with functional mobility   [] ROM to improve independence with functional mobility   [x] Balance Training to improve static/dynamic balance and to reduce fall risk  [x] Endurance Training to improve activity tolerance during functional mobility   [x] Transfer Training to improve safety and independence with all functional transfers   [x] Gait Training to improve gait mechanics, endurance and assess need for appropriate assistive device  [x] Stair Training in preparation for safe discharge home and/or into the community   [] Positioning to prevent skin breakdown and contractures  [x] Safety and Education Training   [] Patient/Caregiver Education   [] HEP  [] Other     PT long term treatment goals are located in above grid    Frequency of treatments: 2-5x/week x 1-2 weeks.     Time in  0900  Time out  0920    Total Treatment Time  10 minutes     Evaluation Time includes thorough review of current medical information, gathering information on past medical history/social history and prior level of function, completion of standardized testing/informal observation of tasks, assessment of data and education on plan of care and goals. CPT codes:  [x] Low Complexity PT evaluation 33583  [] Moderate Complexity PT evaluation 40843  [] High Complexity PT evaluation 61614  [] PT Re-evaluation 84770  [] Gait training 44884 0 minutes  [] Manual therapy 75968 0 minutes  [x] Therapeutic activities 51298 10 minutes  [] Therapeutic exercises 71426 0 minutes  [] Neuromuscular reeducation 84538 0 minutes     Victoriano Jean.  [place], [place]  [id number]

## 2022-01-21 NOTE — DISCHARGE SUMMARY
Discharge Summary    Admit date: 1/18/2022    Discharge date and time: No discharge date for patient encounter. Admitting Physician: Torsten Mckeon DO     Consultants: Cardio, neuro, neurosurg    Admission Diagnoses:  COVID, tardive dyskinesea    Discharge Diagnoses AND Hospital Course:  · COVID-19 infection  · Fatigue  · SVT/Possibly atrial flutter versus other supraventricular arrhythmia. She states she has a history of afib and is on cardizem. RRT called 1/19, given cardizem, adenosine. CTA no PE. Diltiazem increased by cardio. Maintain K > 4.0 and mag > 2.0 to decrease likelihood of recurrent arrhythmias. Outpatient follow-up with her primary cardiologist Dr Janie Weiss. · Paroxysmal afib- low burden on loop recorder. · Tardive dyskinesia- Tardive dyskinesia is a chronic condition which can be managed by outpatient mental health facility a psych eval is also able to be conducted on an outpatient basis.  Will recommend that  refer patient to the outpatient mental health agency at the time of discharge. Stop sertraline, trazodone  · COPD  · Bipolar 1 disorder  · History of alcohol abuse- she states she has not drank for 30 days. Alcohol rehab facilities unable to take her back at this time due to COVID+. She is agreeable for discharge home until she is out of quarantine and then will return to inpatient rehab. · Pulmonary hypertension  · Thrombocytopenia  · Alcoholic cirrhosis of the liver, hepatitis C- status post TIPS. Confusion likely caused by hyperammonemia. Now resolved. Discharge Exam:  Vitals:    01/21/22 0926   BP: 133/68   Pulse: 80   Resp: 20   Temp: 96.1 °F (35.6 °C)   SpO2: 99%       General appearance:  awake, alert, and oriented to person, place, time, and purpose; appears stated age and cooperative; no apparent distress no labored breathing  HEENT:  Conjunctivae/corneas clear. Neck: Supple. No jugular venous distention.    Respiratory: symmetrical; clear to auscultation bilaterally; no wheezes; no rhonchi; no rales  Cardiovascular: rhythm regular;regular rhythm; no murmurs  Abdomen: Soft, nontender, nondistended  Extremities:  peripheral pulses present; no peripheral edema; no ulcers  Musculoskeletal: No clubbing, cyanosis, no bilateral lower extremity edema. Brisk capillary refill. Skin:  No rashes  on visible skin  Neurologic: awake, alert and following commands     Disposition: home  The patient's condition is fair. At this time the patient is without objective evidence of an acute process requiring continuing hospitalization or inpatient management. They are stable for discharge with outpatient follow-up. I have spoken with the patient and discussed the results of the current hospitalization, in addition to providing specific details for the plan of care and counseling regarding the diagnosis and prognosis. The plan has been discussed in detail and they are aware of the specific conditions for emergent return, as well as the importance of follow-up. Their questions are answered at this time and they are agreeable with the plan for discharge to home     Patient Instructions: Follow up with PCP within 7 days. Follow up with neurosurgery as directed below. Follow up with neurology as directed.  Follow up with cardiologist.  Future Appointments   Date Time Provider Sherita Barrios   2/18/2022  1:00 PM Krystian Larson MD [facility]       Discharge Medications:     Medication List      START taking these medications    dexamethasone 6 MG tablet  Commonly known as: DECADRON  Take 1 tablet by mouth daily for 8 doses  Start taking on: January 22, 2022     dilTIAZem 180 MG extended release capsule  Commonly known as: CARDIZEM CD  Take 1 capsule by mouth daily  Start taking on: January 22, 2022     potassium chloride 20 MEQ extended release tablet  Commonly known as: KLOR-CON M  Take 1 tablet by mouth daily        CONTINUE taking these medications    calcium carbonate 600 MG Tabs tablet     lactulose 10 GM/15ML solution  Commonly known as: CHRONULAC  take 30 milliliters by mouth four times a day     magnesium oxide 400 MG tablet  Commonly known as: MAG-OX  Take 1 tablet by mouth daily 2 tabs twice a day     OXYGEN     QUEtiapine 300 MG tablet  Commonly known as: SEROQUEL  Take 1 tablet by mouth nightly. sertraline 50 MG tablet  Commonly known as: ZOLOFT     vitamin B-12 1000 MCG tablet  Commonly known as: CYANOCOBALAMIN        STOP taking these medications    ibuprofen 800 MG tablet  Commonly known as: ADVIL;MOTRIN     naproxen 500 MG tablet  Commonly known as: NAPROSYN     traZODone 50 MG tablet  Commonly known as: DESYREL           Where to Get Your Medications      These medications were sent to [address] [place] [address], [address] [place] [address] Way [id number]    Phone: [phone]   · dexamethasone 6 MG tablet  · dilTIAZem 180 MG extended release capsule     Information about where to get these medications is not yet available    Ask your nurse or doctor about these medications  · magnesium oxide 400 MG tablet  · potassium chloride 20 MEQ extended release tablet         Activity: activity as tolerated    Diet: regular diet    Wound Care: none needed    Follow-up:     · This patient is instructed to follow-up with her primary care physician. · Patient is instructed to follow-up with the consults listed above as directed by them. · They are instructed to resume home medications and take new medications as indicated in the list above. · If the patient has a recurrence of symptoms, they are instructed to go to the ED. Preparing for this patient's discharge, including paperwork, orders, instructions, and meeting with patient did require > 30 minutes.     Juanis Carter DO   4:14 PM  1/21/2022

## 2022-01-21 NOTE — PLAN OF CARE
Neuro plan of care note    We are following for ? Symptomatic Ruddy cisterna magna    MRI of the cervical spine was normal. MRI of the brain showed bilateral frontal and occipital old strokes, which appear embolic, but no acute abnormalities. There also appears to be a degree of atrophy that is more than expected for her age. There have been no new neuro events in the past 24 hours. Patient had a full in person neuro exam yesterday. Cardiology is following her and it appears she has a history of paroxysmal A. fib felt to be of low burden on the loop recorder. She is not on anticoagulation. Patient not examined face to face d/t Covid-19 infection, to reduce the risk of exposure and preserve PPE for direct care staff. Extensive chart review including progress notes, labs, and diagnostic testing was completed and current patient progress discussed with bedside staff. MRI brain WWO: No acute intracranial abnormality. 2. Multiple small chronic infarctions in the bilateral frontal, parietal and occipital lobes. 3. The contrast enhanced portion of the study failed, as no contrast enhancement is evident in the brain. 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 This may be responsible for some of the upper motor neuron signs seen on her exam. Her history of paroxysmal A. fib is concerning--felt to be low burden by cardiology--and may be the etiology of these previous strokes.     COVID-19    Plan:    No further inpatient neuro work-up planned    Recommend secondary prevention of stroke (ASA/statin) if medically able    Recommend additional stroke workup in outpatient setting (CTAs)    Neuro signing off-call with new issues    Follow up with our office in one month    OLYA Rizzo CNP  12:46 PM

## 2022-01-21 NOTE — CARE COORDINATION
Received a message from attending that patient would like to go to a treatment center. Facilities will not accept patient as she is positive for covid. Spoke with patient and she has no family or friends in the [facility] area, she lives in [place], [place] and her mother is 80years old. Patient reports she has no money to pay for transport. Call made to [facility] and they are not able to transport due to patient being independent. Call made to [facility] [phone] to transport patient home to: [address] [place] [address]. [place], [address] [place] [address]. Spoke with [facility] transport set-up; confirmation # [id number]. Number to nurses station provided to Mark Ellis. She states when a  is secured for the trip; they will call and inform the unit. If no call is received by 5P regarding a trip, she requested someone call back for an update. Patient and nurse informed.      Amy Akins, MSW, LSW [phone]

## 2022-01-21 NOTE — PROGRESS NOTES
INPATIENT CARDIOLOGY FOLLOW-UP    Name: Sachin Savage    Age: 63 y.o. Date of Admission: 1/18/2022  4:40 PM    Date of Service: 1/21/2022    Primary Cardiologist: Joy Genao MD in [place], [place]    Chief Complaint: Follow-up for SVT    Interim History:  Remote evaluation due to COVID-19 isolation and patient in the bathroom when I tried to see her    Had about 8 minutes of SVT last evening otherwise remains in sinus with PACs. She was up in the bathroom on my evaluation today.     Review of Systems:   Unable to obtain    Problem List:  Patient Active Problem List   Diagnosis    Anemia    Arthritis     chronic Alcohol abuse    Hepatitis C, chronic    Hepatitis C virus infection without hepatic coma    Serum ammonia increased ([address]    Major depression ([address]    Alcoholic cirrhosis ([address]    Cirrhosis of liver ([address]    Hepatitis C    Constipation    Bipolar disorder ([address]    Alcohol dependence ([address]    Alcoholic cirrhosis of liver without ascites ([address]    S/P TIPS (transjugular intrahepatic portosystemic shunt)    Chronic pancreatitis ([address]    PAF (paroxysmal atrial fibrillation) (HCC)    Abnormal echocardiogram    Pulmonary HTN ([address]    Chronic obstructive pulmonary disease ([address]    Right ventricular enlargement    Right ventricular dysfunction    Status post placement of implantable loop recorder    Encounter for loop recorder check    Nonrheumatic mitral valve regurgitation    LVH (left ventricular hypertrophy)    Atrial enlargement, bilateral    Tobacco abuse    History of alcohol abuse    Thrombocytopenia (HCC)    Dyspnea on exertion    COVID-19    Generalized weakness       Current Medications:    Current Facility-Administered Medications:     dilTIAZem (CARDIZEM CD) extended release capsule 180 mg, 180 mg, Oral, Daily, Foreign Perkins MD    magnesium sulfate 2000 mg in 50 mL IVPB premix, 2,000 mg, IntraVENous, Once, Foreign Perkins MD    dexamethasone (DECADRON) tablet 6 mg, 6 mg, Oral, Daily, Mary Paulino, DO, 6 mg at 01/21/22 6043    lactulose (CHRONULAC) 10 GM/15ML solution 20 g, 20 g, Oral, 4 times per day, Mirta Boone, DO, 20 g at 01/21/22 0549    calcium carbonate (TUMS) chewable tablet 500 mg, 500 mg, Oral, Daily, Marcus Yuan, DO, 500 mg at 01/21/22 7838    vitamin D (CHOLECALCIFEROL) tablet 2,000 Units, 2,000 Units, Oral, Daily, Marcus Yuan, DO, 2,000 Units at 01/21/22 8726    oxyCODONE-acetaminophen (PERCOCET) 5-325 MG per tablet 1 tablet, 1 tablet, Oral, Q4H PRN, Marcus Yuan,     QUEtiapine (SEROQUEL) tablet 300 mg, 300 mg, Oral, Nightly, Marcus Yuan, DO, 300 mg at 01/20/22 2321    vitamin B-12 (CYANOCOBALAMIN) tablet 1,000 mcg, 1,000 mcg, Oral, Daily, Marcus Yuan, DO, 1,000 mcg at 01/21/22 9898    sodium chloride flush 0.9 % injection 5-40 mL, 5-40 mL, IntraVENous, 2 times per day, Marcus Yuan DO, 10 mL at 01/21/22 0935    sodium chloride flush 0.9 % injection 5-40 mL, 5-40 mL, IntraVENous, PRN, Marcus Yuan, DO    0.9 % sodium chloride infusion, 25 mL, IntraVENous, PRN, Marcus Yuan,     enoxaparin (LOVENOX) injection 30 mg, 30 mg, SubCUTAneous, BID, Marcus Yuan DO, 30 mg at 01/21/22 0920    ondansetron (ZOFRAN-ODT) disintegrating tablet 4 mg, 4 mg, Oral, Q8H PRN **OR** ondansetron (ZOFRAN) injection 4 mg, 4 mg, IntraVENous, Q6H PRN, Marcus Greenbergn, DO    polyethylene glycol (GLYCOLAX) packet 17 g, 17 g, Oral, Daily PRN, aMrcus Yuan,     acetaminophen (TYLENOL) tablet 650 mg, 650 mg, Oral, Q6H PRN, 650 mg at 01/21/22 2070 **OR** acetaminophen (TYLENOL) suppository 650 mg, 650 mg, Rectal, Q6H PRN, Marcus Yuan DO    ascorbic acid (VITAMIN C) tablet 500 mg, 500 mg, Oral, Daily, Marcus Yuan DO, 500 mg at 01/21/22 0117    zinc sulfate (ZINCATE) capsule 50 mg, 50 mg, Oral, Daily, Marcus Yuan DO, 50 mg at 01/21/22 0921    guaiFENesin-dextromethorphan (ROBITUSSIN DM) 100-10 MG/5ML syrup 5 mL, 5 mL, Oral, Q4H Joanne SHINE DO    Physical Exam:  /68   Pulse 80   Temp 96.1 °F (35.6 °C) (Axillary)   Resp 20   Ht 5' 8\" (1.727 m)   Wt 160 lb (72.6 kg)   LMP 07/19/2006   SpO2 99%   BMI 24.33 kg/m²   Wt Readings from Last 3 Encounters:   01/18/22 160 lb (72.6 kg)   04/13/21 150 lb 6.4 oz (68.2 kg)   09/23/20 157 lb (71.2 kg)     Appearance: Appears no acute distress, was sitting on the toilet    Intake/Output:    Intake/Output Summary (Last 24 hours) at 1/21/2022 1149  Last data filed at 1/21/2022 1115  Gross per 24 hour   Intake 600 ml   Output 0 ml   Net 600 ml     I/O this shift:  In: 120 [P.O.:120]  Out: -     Laboratory Tests:  Recent Labs     01/18/22  1201 01/20/22  0638 01/21/22  0436    144 139   K 3.9 4.0 3.9   * 113* 109*   CO2 21* 19* 17*   BUN 13 15 18   CREATININE 0.7 0.8 0.7   GLUCOSE 102* 96 182*   CALCIUM 8.9 9.0 9.1     Lab Results   Component Value Date    MG 1.7 01/21/2022     Recent Labs     01/18/22  1201 01/20/22  0638 01/21/22  0436   ALKPHOS 201* 208* 248*   ALT 20 21 32   AST 36* 45* 62*   PROT 6.1* 6.1* 6.4   BILITOT 1.6* 1.8* 1.5*   LABALBU 3.2* 2.9* 3.1*     Recent Labs     01/18/22  1201 01/20/22  0638 01/21/22  0436   WBC 5.3 3.7* 9.2   RBC 3.91 4.06 4.45   HGB 12.2 12.7 13.9   HCT 37.6 40.9 41.7   MCV 96.2 100.7* 93.7   MCH 31.2 31.3 31.2   MCHC 32.4 31.1* 33.3   RDW 16.1* 17.0* 15.9*   * 93* 90*   MPV 11.2 10.9 10.1     Lab Results   Component Value Date    CKTOTAL 39 01/05/2013    CKMB 0.2 01/05/2013    TROPONINI <0.01 01/04/2013    TROPONINI <0.01 01/04/2013    TROPONINI <0.01 01/04/2013     Lab Results   Component Value Date    INR 1.3 01/19/2022    INR 1.2 03/08/2018    INR 1.1 12/05/2016    PROTIME 14.3 (H) 01/19/2022    PROTIME 12.0 03/08/2018    PROTIME 11.4 12/05/2016     Lab Results   Component Value Date    TSH 1.830 01/19/2022     Lab Results   Component Value Date    LABA1C 4.3 01/19/2022     Lab Results   Component Value Date    EAG 94 02/15/2018     Lab Results   Component Value Date    CHOL 138 2022    CHOL 183 2015    CHOL 192 2014     Lab Results   Component Value Date    TRIG 88 2022    TRIG 96 2015    TRIG 198 2014     Lab Results   Component Value Date    HDL 65 2022    HDL 66 02/15/2018    HDL 77 2015     Lab Results   Component Value Date    LDLCALC 55 2022    LDLCALC 86 2014    LDLCHOLESTEROL 81 02/15/2018    LDLCHOLESTEROL 87 2015    LDLCHOLESTEROL 48 2013     Lab Results   Component Value Date    LABVLDL 18 2022    VLDL NOT REPORTED 02/15/2018    VLDL NOT REPORTED 2015    VLDL NOT REPORTED 2013     Lab Results   Component Value Date    CHOLHDLRATIO 2.5 02/15/2018    CHOLHDLRATIO 2.4 2015    CHOLHDLRATIO 2.9 2014     No results for input(s): PROBNP in the last 72 hours. Cardiac Tests:    EK2022: Sinus rhythm 90 beats minute with PACs    2022 at 1151: SVT at 144 bpm..  Narrow complex. Appears to be long RP. Right axis. Nonspecific ST changes. Telemetry: Sinus rhythm 70s with PACs    Episode of SVT last evening regular complex tachycardia 130s    Chest X-ray:   22      Impression   1. Bilateral lower lobe pneumonia   2. 1 cm opacity overlying the right 6th rib.  Dedicated CT of the thorax is   recommended for further evaluation. Echocardiogram:   Transthoracic echo 2020    · Normal left ventricular size with normal systolic function. EF 55-60%.     · Mild left ventricular hypertrophy. · Moderate left atrial enlargement. Mild right atrial enlargement. · Thickened mitral valve. Mild to moderate mitral regurgitation. · Mild tricuspid regurgitation. Mild to moderately elevated right heart pressures, RVSP 41 mmHg. · Mild pulmonic insufficiency. · Aneurysmal atrial septum. · Left ventricular diastolic dysfunction. · Compared to previous echo 3/19.  RVSP has increased.      Stress test: 2/20/2020  IMPRESSION:   Compared to prior study 2/19 (exericise 80% PMHR), no significant changes are noted.     EKG:  No Ischemic EKG Changes. No arrhythmia noted during test.   IMAGING: Ischemia is present. No prior infarct seen. .  The left ventricular ejection fraction was normal and measured 61%. Left ventricular wall motion was normal.   SYMPTOMS: The patient complained of chest pain which spontaneously resolved in recovery. She experienced  no other symptoms  during the infusion. Cardiac catheterization:    ----------------------------------------------------------------------------------------------------------------------------------------------------------------  IMPRESSION:  1. Paroxysmal SVT. Appears to be long RP, likely atrial tachycardia, versus atrial flutter versus other supraventricular arrhythmia. Converted after adenosine administration. Brief nonsustained recurrence last night about 18 minutes  2. COVID-19 pneumonia  3. History of paroxysmal A. fib. Low burden on loop recorder. [id number] 1 (female) not previously on anticoagulation  4. History of loop recorder implant  5. History of abnormal stress test 2/2020. Currently denying chest pain  6. Pulmonary hypertension. ? Portopulmonary hypertension or other etiology  7. Mild to moderate MR, mild TR  8. Alcoholic cirrhosis of the liver, hepatitis C, status post TIPS  9. Thrombocytopenia  10. Bipolar disorder  11. Alcohol abuse  12. COPD      RECOMMENDATIONS:  Predominantly maintaining sinus, had brief SVT last evening.      Increase diltiazem to 180 mg daily   Potassium and magnesium replacement again ordered today   Maintain K > 4.0 and mag > 2.0 to decrease likelihood of recurrent arrhythmias   Monitor telemetry   Further care per primary service   Aggressive risk factor modification   Outpatient follow-up with her primary cardiologist Dr John Solomon Will be available as needed, please call with questions recurrent arrhythmias    Will Range MD, [facility]    NOTE: This report was transcribed using voice recognition software. Every effort was made to ensure accuracy; however, inadvertent computerized transcription errors may be present.

## 2022-01-21 NOTE — PROGRESS NOTES
OCCUPATIONAL THERAPY INITIAL EVALUATION    [facility] [address] [place] [address]        Date:2022                                                  Patient Name: Jame Luevano    MRN: [id number]    : 1960    Room: [address] [place] [address]          Evaluating OT: Sonya Dasilva OTR/L; [id number]       Referring Provider: Adri Brewer DO    Specific Provider Orders/Date: OT Eval and Treat 22       Diagnosis: COVID-19, tardive dyskinesia, UTI, generalized weakness    Surgery: None this admission     Pertinent Medical History:  has a past medical history of Alcohol abuse, unspecified, Alcoholic cirrhosis of liver ([address] Anxiety, Arrhythmia, Asthma, Bipolar disorder ([address] Cirrhosis of liver ([address] Constipation, COPD (chronic obstructive pulmonary disease) ([address] Depression, GERD (gastroesophageal reflux disease), Hepatic encephalopathy ([address] Hepatitis C, Pancreatitis, alcoholic, Pulmonary hypertension ([address] and Seizures ([address]        Recommended Adaptive Equipment: TBD     Precautions:  Fall Risk, droplet+ precautions, +COVID     Assessment of current deficits   [x] Functional mobility  [x]ADLs  [x] Strength               []Cognition    [x] Functional transfers   [x] IADLs         [x] Safety Awareness   [x]Endurance    [] Fine Coordination              [x] Balance      [] Vision/perception   []Sensation     []Gross Motor Coordination  [] ROM  [] Delirium                   [] Motor Control     OT PLAN OF CARE   OT POC based on physician orders, patient diagnosis and results of clinical assessment    Frequency/Duration 1-3 days/wk for 2 weeks PRN   Specific OT Treatment Interventions to include:   * Instruction/training on adapted ADL techniques and AE recommendations to increase functional independence within precautions       * Training on energy conservation strategies, correct breathing pattern and techniques to improve supine: Independent    Functional Transfers Sit to stand:CGA   Stand to sit:CGA  Stand pivot: NT  Commode: Minimal Assist from low surface. Sit to stand:Independent   Stand to sit: Independent  Stand pivot: Independent  Commode: Independent    Functional Mobility Minimal Assist  Progressed to CGA throughout session - within household distance. Required verbal cues to widen IRINEO & assist for ww management. Modified [place]    Balance Sitting:     Static - SBA     Dynamic - SBA  Standing: CGA  Sitting:     Static: Independent     Dynamic: Independent  Standing: Independent   Activity Tolerance Fair  Good   Visual/  Perceptual Glasses: Readers  Pt able to read wall clock. Safety Fair  Good  during ADL completion   Vitals Pt on RA - throughout session 80 bpm, 97%SpO2         Hand Dominance Right   AROM (PROM) Strength Additional Info:  Goal:   RUE  WFL 4-/5 grossly tested good  and wfl FMC/dexterity noted during ADL tasks   Improve overall RUE strength to 4+/5 for participation in functional tasks       LUE WFL 4-/5 grossly tested Good  and wfl FMC/dexterity noted during ADL tasks   Improve overall LUE strength to 4+/5 for participation in functional tasks         Hearing: [facility]   Sensation:  No c/o numbness or tingling  Tone: WFL   Edema: Unremarkable    Comment: Cleared by RN to see pt. Upon arrival patient supine in bed and agreeable to OT session. At end of session, patient seated in bedside chair with call light and phone within reach, all lines and tubes intact. Overall patient demonstrated decreased independence and safety during completion of ADL/functional transfer/mobility tasks. Therapist facilitated ADL activities/functional transfers/functional mobility/bed mobility to improve pt engagement & independence in ADLs/IADLs. Pt educated on breathing techniques to improve EC/WS.  Pt would benefit from continued skilled OT to increase safety and independence with completion of ADL/IADL tasks for functional independence and quality of life. Treatment: OT treatment provided this date includes:    ADL-  Instruction/training on safety and adapted techniques for completion of ADLs: Pt completed toileting on low surface with assist to maintain dynamic standing balance & verbal cues to utilize grab bars. Pt washed hands at sink, would lean forearms on sink to maintain dynamic balance. Required increased time throughout d/t decreased dynamic standing balance.   Mobility-  Instruction/training on safety and improved independence with bed mobility/functional transfers and functional mobility. Pt required instruction & verbal cues to widen IRINEO during functional mobility - pt demo'd shuffling gait pattern. Assist for ww management & safety throughout.   Activity tolerance- Instruction/training on energy conservation/work simplification for completion of ADLs: Pt educated on breathing techniques to improve activity tolerance. Rehab Potential: Good for established goals     LTG: maximize independence with ADLs to return to PLOF    Patient and/or family were instructed on functional diagnosis, prognosis/goals and OT plan of care. Demonstrated fair understanding. Eval Complexity: Low   · History: Expanded chart review of medical records and additional review of physical, cognitive, or psychosocial history related to current functional performance  · Exam: 3+ performance deficits  · Assistance/Modification: Min/mod assistance or modifications required to perform tasks. May have comorbidities that affect occupational performance. Evaluation time includes thorough review of current medical information, gathering information on past medical & social history & PLOF, completion of standardized testing, informal observation of tasks, consultation with other medical professions/disciplines, assessment of data & development of POC/goals.      Time In: 1325  Time Out: 1404  Total Treatment Time: 24 minutes    Min Units   OT Eval Low 31220  x     OT Eval Medium 28856      OT Eval High 03675      OT Re-Eval [id number]       Therapeutic Ex 90374       Therapeutic Activities 56270       ADL/Self Care 94653  24  2   Orthotic Management 21180       Manual 38118     Neuro Re-Ed 13615       Non-Billable Time          Evaluation Time additionally includes thorough review of current medical information, gathering information on past medical history/social history and prior level of function, interpretation of standardized testing/informal observation of tasks, assessment of data and development of plan of care and goals.               [place] OTR/L; [id number]

## 2022-01-22 VITALS
OXYGEN SATURATION: 92 % | DIASTOLIC BLOOD PRESSURE: 72 MMHG | HEART RATE: 82 BPM | WEIGHT: 160 LBS | HEIGHT: 68 IN | RESPIRATION RATE: 16 BRPM | SYSTOLIC BLOOD PRESSURE: 100 MMHG | TEMPERATURE: 98.6 F | BODY MASS INDEX: 24.25 KG/M2

## 2022-01-22 PROCEDURE — 6370000000 HC RX 637 (ALT 250 FOR IP): Performed by: INTERNAL MEDICINE

## 2022-01-22 PROCEDURE — 6360000002 HC RX W HCPCS: Performed by: INTERNAL MEDICINE

## 2022-01-22 PROCEDURE — 6370000000 HC RX 637 (ALT 250 FOR IP): Performed by: FAMILY MEDICINE

## 2022-01-22 RX ADMIN — CYANOCOBALAMIN TAB 1000 MCG 1000 MCG: 1000 TAB at 10:25

## 2022-01-22 RX ADMIN — LACTULOSE 20 G: 10 SOLUTION ORAL at 00:18

## 2022-01-22 RX ADMIN — DEXAMETHASONE 6 MG: 4 TABLET ORAL at 10:24

## 2022-01-22 RX ADMIN — Medication 2000 UNITS: at 10:25

## 2022-01-22 RX ADMIN — LACTULOSE 20 G: 10 SOLUTION ORAL at 06:38

## 2022-01-22 RX ADMIN — LACTULOSE 20 G: 10 SOLUTION ORAL at 13:23

## 2022-01-22 RX ADMIN — OXYCODONE HYDROCHLORIDE AND ACETAMINOPHEN 500 MG: 500 TABLET ORAL at 10:25

## 2022-01-22 RX ADMIN — CALCIUM CARBONATE 500 MG: 500 TABLET, CHEWABLE ORAL at 10:25

## 2022-01-22 RX ADMIN — ZINC SULFATE 220 MG (50 MG) CAPSULE 50 MG: CAPSULE at 10:25

## 2022-01-22 RX ADMIN — Medication 400 MG: at 10:25

## 2022-01-22 RX ADMIN — DILTIAZEM HYDROCHLORIDE 180 MG: 180 CAPSULE, EXTENDED RELEASE ORAL at 10:24

## 2022-01-22 ASSESSMENT — PAIN SCALES - GENERAL
PAINLEVEL_OUTOF10: 0
PAINLEVEL_OUTOF10: 0

## 2022-01-22 NOTE — PROGRESS NOTES
Patient stable for discharge  However issues with transportation back to [facility]  No family able to  patient    Electronically signed by Giulia Perry DO on 1/22/2022 at 2:16 PM

## 2022-01-22 NOTE — PROGRESS NOTES
Notified social work on call that there would not be any transportation for the patient to go home today.

## 2022-01-22 NOTE — PROGRESS NOTES
Received call back from [facility], They are unable to provide transport tonight and they will try to reschedule patient for tomorrow but is unsure if they will be able to find a  as she is Covid positive and has to be transported via ambulance.     Alton Tobias RN

## 2022-01-22 NOTE — PLAN OF CARE
Problem: Airway Clearance - Ineffective  Goal: Achieve or maintain patent airway  Outcome: Met This Shift     Problem: Gas Exchange - Impaired  Goal: Absence of hypoxia  Outcome: Met This Shift  Goal: Promote optimal lung function  Outcome: Met This Shift     Problem: Breathing Pattern - Ineffective  Goal: Ability to achieve and maintain a regular respiratory rate  Outcome: Met This Shift     Problem:  Body Temperature -  Risk of, Imbalanced  Goal: Ability to maintain a body temperature within defined limits  Outcome: Met This Shift  Goal: Will regain or maintain usual level of consciousness  Outcome: Met This Shift  Goal: Complications related to the disease process, condition or treatment will be avoided or minimized  Outcome: Met This Shift     Problem: Isolation Precautions - Risk of Spread of Infection  Goal: Prevent transmission of infection  Outcome: Met This Shift     Problem: Nutrition Deficits  Goal: Optimize nutritional status  Outcome: Met This Shift     Problem: Risk for Fluid Volume Deficit  Goal: Maintain normal heart rhythm  Outcome: Met This Shift  Goal: Maintain absence of muscle cramping  Outcome: Met This Shift  Goal: Maintain normal serum potassium, sodium, calcium, phosphorus, and pH  Outcome: Met This Shift     Problem: Loneliness or Risk for Loneliness  Goal: Demonstrate positive use of time alone when socialization is not possible  Outcome: Met This Shift     Problem: Fatigue  Goal: Verbalize increase energy and improved vitality  Outcome: Met This Shift     Problem: Patient Education: Go to Patient Education Activity  Goal: Patient/Family Education  Outcome: Met This Shift     Problem: Skin Integrity:  Goal: Will show no infection signs and symptoms  Description: Will show no infection signs and symptoms  Outcome: Met This Shift  Goal: Absence of new skin breakdown  Description: Absence of new skin breakdown  Outcome: Met This Shift     Problem: Falls - Risk of:  Goal: Will remain free from falls  Description: Will remain free from falls  Outcome: Met This Shift  Goal: Absence of physical injury  Description: Absence of physical injury  Outcome: Met This Shift     Problem: Pain:  Goal: Pain level will decrease  Description: Pain level will decrease  Outcome: Met This Shift  Goal: Control of acute pain  Description: Control of acute pain  Outcome: Met This Shift  Goal: Control of chronic pain  Description: Control of chronic pain  Outcome: Met This Shift

## 2022-01-22 NOTE — CARE COORDINATION
Call received from [facility] caring for patient. She states multiple conversations with [facility] who reports they can not find transport for patient back to [facility]. Called and discussed case with Bill Zambrano. She suggests patient take taxi to [facility] to take a bus home, cost is 51 dollars for a trip tonight at 6:55p or 46 dollars for a trip tomorrow morning at 9:15am.  If that is not feasible and patient can not get family to come get her, then she suggests taxi voucher to the [employer], where patient can stay and continue to work on transport home. Call placed to patients room, spoke with her at length. She does not have any cash or cards with her for payment of any kind of transportation, therefore she can not take a [facility] bus home. She suggests calling her sister, Mendy Watson [phone] to see if family can  patient, patient states she can not call herself due to not having her glasses and can't see the numbers on phone. Call placed to Mendy Boldeni, discussed case at length. She states she will call her brother and they will see if someone in the family can possibly come  patient tonight or first thing in am but she states there is a \"winter storm warning\" and also everyone in their family currently has covid. She was provided the nurses station number to call back with info on who will  patient and when. CHRISTELLE informed her that if family is not able, or does not come to get patient then she will be given a taxi voucher to go to the [employer] as suggested by Bill Guthrie. Called and spoke with Johnathan Quiñonez RN for patient and notified her that sister, Mendy Watson is to be working on family coming to get patient and if they can not or do not call back then they can send patient to [address] [place] [address] using taxi voucher CHRISTELLE provided to floor.

## 2022-01-22 NOTE — PROGRESS NOTES
Attempted to call family to arrange transport home. Rang to voicemail that is full.  Unable to arrange anything with family at this time

## 2022-01-23 ENCOUNTER — APPOINTMENT (OUTPATIENT)
Dept: CT IMAGING | Age: 62
End: 2022-01-23
Payer: MEDICAID

## 2022-01-23 ENCOUNTER — APPOINTMENT (OUTPATIENT)
Dept: GENERAL RADIOLOGY | Age: 62
End: 2022-01-23
Payer: MEDICAID

## 2022-01-23 ENCOUNTER — HOSPITAL ENCOUNTER (EMERGENCY)
Age: 62
Discharge: HOME | End: 2022-01-24
Attending: EMERGENCY MEDICINE
Payer: MEDICAID

## 2022-01-23 ENCOUNTER — TELEPHONE (OUTPATIENT)
Dept: OTHER | Facility: CLINIC | Age: 62
End: 2022-01-23

## 2022-01-23 DIAGNOSIS — G44.209 TENSION HEADACHE: ICD-10-CM

## 2022-01-23 DIAGNOSIS — Z02.9 DISCHARGE PLANNING ISSUES: Primary | ICD-10-CM

## 2022-01-23 DIAGNOSIS — U07.1 COVID-19: ICD-10-CM

## 2022-01-23 LAB
ALBUMIN SERPL-MCNC: 3.8 G/DL (ref 3.5–5.2)
ALP BLD-CCNC: 236 U/L (ref 35–104)
ALT SERPL-CCNC: 37 U/L (ref 0–32)
ANION GAP SERPL CALCULATED.3IONS-SCNC: 13 MMOL/L (ref 7–16)
AST SERPL-CCNC: 46 U/L (ref 0–31)
BASOPHILS ABSOLUTE: 0.01 E9/L (ref 0–0.2)
BASOPHILS RELATIVE PERCENT: 0.1 % (ref 0–2)
BILIRUB SERPL-MCNC: 1.3 MG/DL (ref 0–1.2)
BUN BLDV-MCNC: 17 MG/DL (ref 6–23)
CALCIUM SERPL-MCNC: 8.9 MG/DL (ref 8.6–10.2)
CHLORIDE BLD-SCNC: 105 MMOL/L (ref 98–107)
CO2: 19 MMOL/L (ref 22–29)
CREAT SERPL-MCNC: 0.7 MG/DL (ref 0.5–1)
EOSINOPHILS ABSOLUTE: 0 E9/L (ref 0.05–0.5)
EOSINOPHILS RELATIVE PERCENT: 0 % (ref 0–6)
GFR AFRICAN AMERICAN: >60
GFR NON-AFRICAN AMERICAN: >60 ML/MIN/1.73
GLUCOSE BLD-MCNC: 315 MG/DL (ref 74–99)
HCT VFR BLD CALC: 38.3 % (ref 34–48)
HEMOGLOBIN: 13.9 G/DL (ref 11.5–15.5)
IMMATURE GRANULOCYTES #: 0.11 E9/L
IMMATURE GRANULOCYTES %: 0.9 % (ref 0–5)
LYMPHOCYTES ABSOLUTE: 0.72 E9/L (ref 1.5–4)
LYMPHOCYTES RELATIVE PERCENT: 6 % (ref 20–42)
MCH RBC QN AUTO: 35.5 PG (ref 26–35)
MCHC RBC AUTO-ENTMCNC: 36.3 % (ref 32–34.5)
MCV RBC AUTO: 97.7 FL (ref 80–99.9)
MONOCYTES ABSOLUTE: 0.95 E9/L (ref 0.1–0.95)
MONOCYTES RELATIVE PERCENT: 8 % (ref 2–12)
NEUTROPHILS ABSOLUTE: 10.12 E9/L (ref 1.8–7.3)
NEUTROPHILS RELATIVE PERCENT: 85 % (ref 43–80)
PDW BLD-RTO: 16.4 FL (ref 11.5–15)
PLATELET # BLD: 110 E9/L (ref 130–450)
PMV BLD AUTO: 11.2 FL (ref 7–12)
POTASSIUM REFLEX MAGNESIUM: 4 MMOL/L (ref 3.5–5)
RBC # BLD: 3.92 E12/L (ref 3.5–5.5)
SODIUM BLD-SCNC: 137 MMOL/L (ref 132–146)
TOTAL PROTEIN: 7.4 G/DL (ref 6.4–8.3)
TROPONIN, HIGH SENSITIVITY: 7 NG/L (ref 0–9)
WBC # BLD: 11.9 E9/L (ref 4.5–11.5)

## 2022-01-23 PROCEDURE — 85025 COMPLETE CBC W/AUTO DIFF WBC: CPT

## 2022-01-23 PROCEDURE — 36415 COLL VENOUS BLD VENIPUNCTURE: CPT

## 2022-01-23 PROCEDURE — 99285 EMERGENCY DEPT VISIT HI MDM: CPT

## 2022-01-23 PROCEDURE — 70450 CT HEAD/BRAIN W/O DYE: CPT

## 2022-01-23 PROCEDURE — 93005 ELECTROCARDIOGRAM TRACING: CPT | Performed by: STUDENT IN AN ORGANIZED HEALTH CARE EDUCATION/TRAINING PROGRAM

## 2022-01-23 PROCEDURE — 80053 COMPREHEN METABOLIC PANEL: CPT

## 2022-01-23 PROCEDURE — 71046 X-RAY EXAM CHEST 2 VIEWS: CPT

## 2022-01-23 PROCEDURE — 84484 ASSAY OF TROPONIN QUANT: CPT

## 2022-01-23 NOTE — ED NOTES
Call to sister Levy Lorenzo [phone]. She stated that first thing in the morning she will make arrangements to come get the pt. She lives 2 [place] 40 min away.        Liliana Lopez, [facility]  01/23/22 3080

## 2022-01-23 NOTE — ED NOTES
Patient originally from [place], in this area for alcohol rehab, was admitted here on the 18th for gen weakness. D/C'd yesterday with no where to go and family unable to  d/t covid exposure and inclement weather. Covid positive and rescue mission could not accept, therefore she was sent back to ED.      Iram Simms RN  01/23/22 8104

## 2022-01-23 NOTE — ED PROVIDER NOTES
[facility]  Department of Emergency Medicine     Written by: Justa Lorenzo DO  Patient Name: Srinivas Rojas  Attending Provider: No att. providers found  Admit Date: 2022  9:45 AM  MRN: [id number]                   : 1960        Chief Complaint   Patient presents with    Other     patient was D/C from floor yesterday and sent to rescue mission because she's from [place] and unable to get ride home, patient has covid and requires O2 that she is unable to get while at rescue [facility], patient needs assistance    - Chief complaint    Patient is an 72-year-old female past medical history of alcohol abuse, bipolar, cirrhosis, COPD, hepatitis C and pancreatitis. Patient due to unable to be accepted to rescue mission after discharge. Per patient she was just discharged from the floor yesterday and was sent to rescue mission however due to her Covid positive status she was refused by the rescue mission and sent back to the ER for evaluation. Patient currently denies any specific complaints besides a mild headache. Currently rates pain a 2 out of 10. She denies any exacerbating relieving factors. Stated symptoms of been intermittent since onset. Patient states that headache has been chronic since been diagnosed with Covid. Patient states that she is in the area for alcohol rehab and is originally from [place]. She states that she has no family locally. She is concerned that she will be unable to find a ride home. Patient has no fevers, chills, nausea, vomiting, abdominal pain, constipation or diarrhea. Review of Systems   Constitutional: Negative for chills and fever. HENT: Negative for ear pain, sinus pressure and sore throat. Eyes: Negative for pain, discharge and redness. Respiratory: Negative for cough, shortness of breath and wheezing. Cardiovascular: Negative for chest pain.    Gastrointestinal: Negative for abdominal distention, diarrhea, nausea and vomiting. Genitourinary: Negative for dysuria and frequency. Musculoskeletal: Negative for arthralgias and back pain. Skin: Negative for rash and wound. Neurological: Positive for headaches. Negative for weakness. Hematological: Negative for adenopathy. All other systems reviewed and are negative. Physical Exam  Vitals and nursing note reviewed. Constitutional:       General: She is not in acute distress. Appearance: Normal appearance. HENT:      Head: Normocephalic and atraumatic. Nose: No congestion or rhinorrhea. Mouth/Throat:      Mouth: Mucous membranes are moist.      Pharynx: Oropharynx is clear. Eyes:      Extraocular Movements: Extraocular movements intact. Pupils: Pupils are equal, round, and reactive to light. Cardiovascular:      Rate and Rhythm: Normal rate and regular rhythm. Heart sounds: No murmur heard. No gallop. Pulmonary:      Effort: Pulmonary effort is normal. No respiratory distress. Breath sounds: No wheezing, rhonchi or rales. Abdominal:      General: Abdomen is flat. Palpations: Abdomen is soft. There is no mass. Tenderness: There is no abdominal tenderness. There is no guarding. Hernia: No hernia is present. Musculoskeletal:         General: No swelling, tenderness or signs of injury. Normal range of motion. Cervical back: Normal range of motion. No rigidity. No muscular tenderness. Skin:     General: Skin is warm and dry. Capillary Refill: Capillary refill takes less than 2 seconds. Neurological:      General: No focal deficit present. Mental Status: She is alert and oriented to person, place, and time. Mental status is at baseline.    Psychiatric:         Mood and Affect: Mood normal.         Behavior: Behavior normal.          Procedures       MDM  Number of Diagnoses or Management Options  COVID-19  Discharge planning issues  Diagnosis management comments: Patient is an 62-year-old female past medical history of alcohol abuse, bipolar, cirrhosis, COPD, hepatitis C and pancreatitis. Patient presents with chief complaint of concern for safe discharge. Patient was recently discharged to rescue mission with COVID-19 however she was refused due to Covid status. Patient was sent back to the ER for evaluation. Vital signs stable presentation. Physical exam heart regular rate and rhythm, lungs clear to station bilaterally, abdomen soft nontender no rigidity rebound or guarding. EKG obtained demonstrate no acute ischemic changes. Laboratory obtained CBC demonstrate mild leukocytosis 1.9, CMP demonstrated elevated glucose of 315, CO2 19, troponin VII. CT scan of the head demonstrated no acute normalities, chest x-ray demonstrate no acute normalities. Reevaluation patient resting company. Find consistent with headache likely secondary to COVID-19. Social consult for discharge planning. Plan is for discharge home with family versus discharge to rescue mission pending final social work evaluation. Amount and/or Complexity of Data Reviewed  Clinical lab tests: reviewed  Tests in the radiology section of CPT®: reviewed  Tests in the medicine section of CPT®: reviewed  Decide to obtain previous medical records or to obtain history from someone other than the patient: yes         ED Course as of 01/24/22 1132   Sun Jan 23, 2022   8742 Plan for discharge if patient can arrange ride or place to stay.  to see the patient.   [PP]      ED Course User Index  [PP] Tra Flores DO      --------------------------------------------- PAST HISTORY ---------------------------------------------  Past Medical History:  has a past medical history of Alcohol abuse, unspecified, Alcoholic cirrhosis of liver ([facility] Anxiety, Arrhythmia, Asthma, Bipolar disorder ([facility] Cirrhosis of liver ([facility] Constipation, COPD (chronic obstructive [facility] disease) [facility] Depression, GERD (gastroesophageal reflux disease), Hepatic encephalopathy ([facility] Hepatitis C, Pancreatitis, alcoholic, Pulmonary hypertension ([facility] and Seizures ([facility] Past Surgical History:  has a past surgical history that includes Cholecystectomy; Hysterectomy;  section; Colonoscopy (14); Upper gastrointestinal endoscopy (14); Upper gastrointestinal endoscopy (2015); Knee arthroscopy (Left, 2016); ERCP (N/A, 3/8/2018); Insertable Cardiac Monitor (2018); Upper gastrointestinal endoscopy (N/A, 2019); Colonoscopy (N/A, 2019); and shoulder surgery (Left, 2019). Social History:  reports that she quit smoking about 7 months ago. Her smoking use included cigarettes. She started smoking about 38 years ago. She has a 8.25 pack-year smoking history. She has never used smokeless tobacco. She reports previous alcohol use. She reports that she does not use drugs. Family History: family history includes Cancer in her brother and father. The patients home medications have been reviewed.     Allergies: Chantix [varenicline] and Penicillins    -------------------------------------------------- RESULTS -------------------------------------------------  Labs:  Results for orders placed or performed during the hospital encounter of 22   CBC Auto Differential   Result Value Ref Range    WBC 11.9 (H) 4.5 - 11.5 E9/L    RBC 3.92 3.50 - 5.50 E12/L    Hemoglobin 13.9 11.5 - 15.5 g/dL    Hematocrit 38.3 34.0 - 48.0 %    MCV 97.7 80.0 - 99.9 fL    MCH 35.5 (H) 26.0 - 35.0 pg    MCHC 36.3 (H) 32.0 - 34.5 %    RDW 16.4 (H) 11.5 - 15.0 fL    Platelets 616 (L) 232 - 450 E9/L    MPV 11.2 7.0 - 12.0 fL    Neutrophils % 85.0 (H) 43.0 - 80.0 %    Immature Granulocytes % 0.9 0.0 - 5.0 %    Lymphocytes % 6.0 (L) 20.0 - 42.0 %    Monocytes % 8.0 2.0 - 12.0 %    Eosinophils % 0.0 0.0 - 6.0 %    Basophils % 0.1 0.0 - 2.0 %    Neutrophils Absolute 10.12 (H) 1.80 - 7.30 E9/L    Immature Granulocytes # 0.11 E9/L Lymphocytes Absolute 0.72 (L) 1.50 - 4.00 E9/L    Monocytes Absolute 0.95 0.10 - 0.95 E9/L    Eosinophils Absolute 0.00 (L) 0.05 - 0.50 E9/L    Basophils Absolute 0.01 0.00 - 0.20 E9/L   Comprehensive Metabolic Panel w/ Reflex to MG   Result Value Ref Range    Sodium 137 132 - 146 mmol/L    Potassium reflex Magnesium 4.0 3.5 - 5.0 mmol/L    Chloride 105 98 - 107 mmol/L    CO2 19 (L) 22 - 29 mmol/L    Anion Gap 13 7 - 16 mmol/L    Glucose 315 (H) 74 - 99 mg/dL    BUN 17 6 - 23 mg/dL    CREATININE 0.7 0.5 - 1.0 mg/dL    GFR Non-African American >60 >=60 mL/min/1.73    GFR African American >60     Calcium 8.9 8.6 - 10.2 mg/dL    Total Protein 7.4 6.4 - 8.3 g/dL    Albumin 3.8 3.5 - 5.2 g/dL    Total Bilirubin 1.3 (H) 0.0 - 1.2 mg/dL    Alkaline Phosphatase 236 (H) 35 - 104 U/L    ALT 37 (H) 0 - 32 U/L    AST 46 (H) 0 - 31 U/L   Troponin   Result Value Ref Range    Troponin, High Sensitivity 7 0 - 9 ng/L   EKG 12 Lead   Result Value Ref Range    Ventricular Rate 71 BPM    Atrial Rate 71 BPM    P-R Interval 156 ms    QRS Duration 92 ms    Q-T Interval 424 ms    QTc Calculation (Bazett) 460 ms    P Axis 51 degrees    R Axis 162 degrees    T Axis 54 degrees       Radiology:  XR CHEST (2 VW)   Final Result   1. Resolving small opacity within the right upper lobe. Dedicated follow-up   examination is recommended in 3-4 months to confirm resolution and exclude   underlying pathology. CT Head WO Contrast   Final Result   1. There is no acute intracranial abnormality. Specifically, there is no   intracranial hemorrhage. 2. Atrophy and periventricular leukomalacia,   3. Stable extra-axial CFS density within the posterior fossa which could   represent an arachnoid cyst or sierra cisterna magna.              ------------------------- NURSING NOTES AND VITALS REVIEWED ---------------------------  Date / Time Roomed:  1/23/2022  9:45 AM  ED Bed Assignment:  17A/17A-17    The nursing notes within the ED encounter and vital signs as below have been reviewed. /79   Pulse 79   Temp 97.4 °F (36.3 °C) (Oral)   Resp 18   Ht 5' 8\" (1.727 m)   Wt 160 lb (72.6 kg)   LMP 07/19/2006   SpO2 97%   BMI 24.33 kg/m²   Oxygen Saturation Interpretation: Normal      ------------------------------------------ PROGRESS NOTES ------------------------------------------  11:28 AM EST  I have spoken with the patient and discussed todays results, in addition to providing specific details for the plan of care and counseling regarding the diagnosis and prognosis. Their questions are answered at this time and they are agreeable with the plan. I discussed at length with them reasons for immediate return here for re evaluation. They will followup with their primary care physician by calling their office tomorrow. --------------------------------- ADDITIONAL PROVIDER NOTES ---------------------------------  At this time the patient is without objective evidence of an acute process requiring hospitalization or inpatient management. They have remained hemodynamically stable throughout their entire ED visit and are stable for discharge with outpatient follow-up. The plan has been discussed in detail and they are aware of the specific conditions for emergent return, as well as the importance of follow-up. New Prescriptions    No medications on file       Diagnosis:  1. Discharge planning issues    2. COVID-19    3. Tension headache        Disposition:  Patient's disposition: Discharge to home  Patient's condition is stable. Patient was seen and evaluated by myself and my attending. Assessment and Plan discussed with attending provider, please see attestation for final plan of care.      DO Tristan España, DO  Resident  01/24/22 0370 [facility], DO  Resident  01/24/22 2901

## 2022-01-23 NOTE — ED NOTES
Call from the pt's sister [phone]. She stated that she cannot  the pt because the weather is bad. Pt given a phone to speak to her sister.       Carmina Evans, [facility]  01/23/22 5840

## 2022-01-23 NOTE — ED NOTES
Social work at bedside for eval.        [facility]  01/23/22 4121

## 2022-01-23 NOTE — ED NOTES
Bed: HAA  Expected date:   Expected time:   Means of arrival:   Comments:     Stanford Combs RN  01/23/22 9446

## 2022-01-24 ENCOUNTER — TELEPHONE (OUTPATIENT)
Dept: OTHER | Facility: CLINIC | Age: 62
End: 2022-01-24

## 2022-01-24 VITALS
SYSTOLIC BLOOD PRESSURE: 127 MMHG | OXYGEN SATURATION: 97 % | RESPIRATION RATE: 18 BRPM | BODY MASS INDEX: 24.25 KG/M2 | HEART RATE: 79 BPM | TEMPERATURE: 97.4 F | DIASTOLIC BLOOD PRESSURE: 79 MMHG | WEIGHT: 160 LBS | HEIGHT: 68 IN

## 2022-01-24 LAB
EKG ATRIAL RATE: 71 BPM
EKG P AXIS: 51 DEGREES
EKG P-R INTERVAL: 156 MS
EKG Q-T INTERVAL: 424 MS
EKG QRS DURATION: 92 MS
EKG QTC CALCULATION (BAZETT): 460 MS
EKG R AXIS: 162 DEGREES
EKG T AXIS: 54 DEGREES
EKG VENTRICULAR RATE: 71 BPM

## 2022-01-24 PROCEDURE — 93010 ELECTROCARDIOGRAM REPORT: CPT | Performed by: INTERNAL MEDICINE

## 2022-01-24 ASSESSMENT — ENCOUNTER SYMPTOMS
EYE DISCHARGE: 0
ABDOMINAL DISTENTION: 0
DIARRHEA: 0
BACK PAIN: 0
SORE THROAT: 0
VOMITING: 0
NAUSEA: 0
SHORTNESS OF BREATH: 0
EYE PAIN: 0
SINUS PRESSURE: 0
COUGH: 0
WHEEZING: 0
EYE REDNESS: 0

## 2022-01-24 NOTE — ED NOTES
Bed: 17A-17  Expected date:   Expected time:   Means of arrival:   Comments:  [address], RN  01/24/22 4402

## 2022-01-24 NOTE — ED NOTES
Report received from [facility]. This RN bedside to change linens and provide warm blankets. Pt updated on plan of care. Resting quietly and comfortably in bed. NAD.  No complaints or needs at this time     Zak De Jesus RN  01/23/22 9347

## 2022-01-24 NOTE — CARE COORDINATION
Social Work /Transition of Care:    Pt presented to the ED secondary to homelessness. Pt was recently admitted to this hospital on 1/18 with fatigue and tested positive for covid. Pt is from the [place] area and had been in the area at an alcohol detox facility. Pt was discharged from this hospital yesterday 1/23, to the [address] [place] [address] but then pt was sent back to the ED because she was covid positive and needed oxygen. Pt pulse ox has remained stable since her arrival to the ED. CHRISTELLE met with pt who was alert and oriented x3, sitting up in bed, no oxygen. Pt stated she plans to return home in [place] but has no way of getting there and unsure if her sister will be picking her up. SW spoke to [facility] and the [facility] [employer] who reports pt was unable to prove identification upon initial arrival to the . Pt conducted phone interview with Kevin Pastor at [facility] and SW provided copy of pt's photo ID. SW also provided taxi voucher. CHRISTELLE spoke to pt's sister, Orlando Pires, over the phone. Pt's sister reports she will be leaving soon to  at the [address] [place] [address] later today. [facility] and the [facility] at [address] [place] [address] aware.

## 2022-01-25 ENCOUNTER — TELEPHONE (OUTPATIENT)
Dept: OTHER | Facility: CLINIC | Age: 62
End: 2022-01-25

## 2022-01-25 NOTE — TELEPHONE ENCOUNTER
N[facility] attempted to contact pt regarding ED follow up appt. Attempt unsuccessful. Voicemail left for pt to return call for assistance.

## 2022-02-15 ENCOUNTER — TELEPHONE (OUTPATIENT)
Dept: GASTROENTEROLOGY | Age: 62
End: 2022-02-15

## 2022-02-15 NOTE — TELEPHONE ENCOUNTER
Spoke with Tre at [facility] Cardiology / she will be sending over something for the provider to look at and give feed back for medication the Cardiologist wants to put patient on but he would like to discuss with GI     Will take care of as soon as ppw is sent to office

## 2022-02-23 NOTE — TELEPHONE ENCOUNTER
Dr Patricia Vela signed for the ok on medications.   Writer faxed it to [phone] and scanned to chart

## 2022-05-24 ENCOUNTER — TELEPHONE (OUTPATIENT)
Dept: GASTROENTEROLOGY | Age: 62
End: 2022-05-24

## 2022-06-21 ENCOUNTER — HOSPITAL ENCOUNTER (EMERGENCY)
Age: 62
Discharge: HOME | End: 2022-06-21
Attending: STUDENT IN AN ORGANIZED HEALTH CARE EDUCATION/TRAINING PROGRAM
Payer: MEDICAID

## 2022-06-21 ENCOUNTER — APPOINTMENT (OUTPATIENT)
Dept: CT IMAGING | Age: 62
End: 2022-06-21
Payer: MEDICAID

## 2022-06-21 VITALS
HEART RATE: 107 BPM | RESPIRATION RATE: 20 BRPM | OXYGEN SATURATION: 92 % | SYSTOLIC BLOOD PRESSURE: 128 MMHG | TEMPERATURE: 97.5 F | DIASTOLIC BLOOD PRESSURE: 70 MMHG

## 2022-06-21 DIAGNOSIS — M54.50 ACUTE MIDLINE LOW BACK PAIN WITHOUT SCIATICA: ICD-10-CM
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
## 2022-06-21 DIAGNOSIS — F10.920 ACUTE ALCOHOLIC INTOXICATION WITHOUT COMPLICATION (HCC): ICD-10-CM

## 2022-06-21 LAB
REASON FOR REJECTION: NORMAL
SARS-COV-2, RAPID: NOT DETECTED
SPECIMEN DESCRIPTION: NORMAL
ZZ NTE CLEAN UP: ORDERED TEST: NORMAL
ZZ NTE WITH NAME CLEAN UP: SPECIMEN SOURCE: NORMAL

## 2022-06-21 PROCEDURE — 87635 SARS-COV-2 COVID-19 AMP PRB: CPT

## 2022-06-21 PROCEDURE — 72131 CT LUMBAR SPINE W/O DYE: CPT

## 2022-06-21 PROCEDURE — 99284 EMERGENCY DEPT VISIT MOD MDM: CPT

## 2022-06-21 PROCEDURE — 36415 COLL VENOUS BLD VENIPUNCTURE: CPT

## 2022-06-21 ASSESSMENT — ENCOUNTER SYMPTOMS
BACK PAIN: 1
SHORTNESS OF BREATH: 0
ABDOMINAL PAIN: 0

## 2022-06-21 NOTE — ED NOTES
Mode of arrival (squad #, walk in, police, etc) : Walk in         Chief complaint(s): Fall, fell on her bottom         Arrival Note (brief scenario, treatment PTA, etc). : Patient states that she is a recovering alcoholic and drank today. She states she fell tripping over her dog, denies hitting her head, she is not on blood thinners, her only pain is in her lower back area/         C= \"Have you ever felt that you should Cut down on your drinking? \"  Yes  A= \"Have people Annoyed you by criticizing your drinking? \"  Yes  G= \"Have you ever felt bad or Guilty about your drinking? \"  Yes  E= \"Have you ever had a drink as an Eye-opener first thing in the morning to steady your nerves or to help a hangover? \"  Yes      Deferred []      Reason for deferring: N/A    *If yes to two or more: probable alcohol abuse. Tracee Carnes RN  06/21/22 3298

## 2022-06-21 NOTE — ED TRIAGE NOTES
Psychosocial and Contextual Factors:   Pt came in for alcohol withdrawal and is seeking detox. C-SSRS Summary:      Patient: X  Family:   Agency:     Substance Abuse Pt reports drinking a fifth and a half of vodka a day. Present Suicidal Behavior:  Denied    Verbal: Denied    Attempt:Denied    Past Suicidal Behavior: Denied    Verbal:Denied     Attempt:Denied       Self-Injurious/Self-Mutilation:Denied       Violence Current or Past Denied       Trauma Identified:  Denied     Protective Factors:    Pt has housing      Risk Factors:    AOD use      Clinical Summary:    Pt is a 63 y.o. female who presented to ED with complaints of alcohol withdrawal and requesting detox. Pt denied SI, HI, and hallucinations. Pt reported she is drinking a fifth and a half of vodka a day and has been doing this since she was 38years old. Pt reports she currently lives with her 80year old mother and receives $90 in SSDI a month. Pt denied trauma hx. Pt denied legal issues. Pt reports her sleep has been \"not good\" and her appetite has been \"lousy\". Pt is not linked to a [facility] and denied psychiatric medication prescription. Pt denied any previous attempts to harm herself or others. Level of Care Disposition:     to assist pt in applying for detox.

## 2022-06-21 NOTE — ED NOTES
Pt discharged in stable condition with prescriptions and dc instructions. Pt ambulates to door with steady gait and without assistance.           Jessica Raya RN  06/21/22 9867

## 2022-06-21 NOTE — ED PROVIDER NOTES
EMERGENCY DEPARTMENT ENCOUNTER   ATTENDING ATTESTATION     Pt Name: Jermaine Garland  MRN: [id number]  [place] 1960  Date of evaluation: 6/21/22   Jermaine Garland is a 63 y.o. female with CC: Fall    MDM:   This visit was performed by both a physician and an APC. I personally evaluated and examined the patient. I performed all aspects of the MDM as documented. 63year old female history of alcohol abuse presents for evaluation after a fall with low back pain. No neurologic symptoms. Imaging negative for acute injury. Requesting resources for substance abuse. Seen by social work who coordinated rehab. Will discharge in the care of substance abuse [place]           CRITICAL CARE:       EKG: All EKG's are interpreted by the Emergency Department Physician who either signs or Co-signs this chart in the absence of a cardiologist.      RADIOLOGY:All plain film, CT, MRI, and formal ultrasound images (except ED bedside ultrasound) are read by the radiologist, see reports below, unless otherwise noted in MDM or here. CT Lumbar Spine WO Contrast   Final Result   No evidence of acute traumatic injury to the lumbar spine. Degenerative changes as described above. LABS: All lab results were reviewed by myself, and all abnormals are listed below. Labs Reviewed   COVID-19, RAPID   SPECIMEN REJECTION   CBC WITH AUTO DIFFERENTIAL   ETHANOL   BASIC METABOLIC PANEL W/ REFLEX TO MG FOR LOW K   HEPATIC FUNCTION PANEL     CONSULTS:  None  FINAL IMPRESSION      1. Fall, initial encounter    2. Acute alcoholic intoxication without complication ([address]    3.  Acute midline low back pain without sciatica            PASTMEDICAL HISTORY     Past Medical History:   Diagnosis Date    Alcohol abuse, unspecified     Alcoholic cirrhosis of liver (HCC)     Anxiety     Arrhythmia     Asthma     Bipolar disorder ([address]     Cirrhosis of liver ([address]     Constipation     COPD (chronic obstructive pulmonary disease) ([address]  Depression     GERD (gastroesophageal reflux disease)     Hepatic encephalopathy (HCC)     Hepatitis C     Pancreatitis, alcoholic     Pulmonary hypertension (HCC)     Seizures (HCC)     5 in the past related to alcohol withdrawal     SURGICAL HISTORY       Past Surgical History:   Procedure Laterality Date     SECTION      CHOLECYSTECTOMY      COLONOSCOPY  14    NORMAL    COLONOSCOPY N/A 2019    COLONOSCOPY DIAGNOSTIC performed by Carlotta Wong MD at [address] [facility] ERCP N/A 3/8/2018    ERCP SPINCTER/PAPILLOTOMY; BALLOON SWEEP, STONE EXTRACTION & BIOPSY performed by [facility] at [facility]  2018    [employer]    KNEE ARTHROSCOPY Left 2016    Arthroscopic Chondroplasty, Lateral meniscectomy    SHOULDER SURGERY Left 2019    UPPER GASTROINTESTINAL ENDOSCOPY  14    SCHATZKI RING, MILD CHRONIC GASTRITIS    UPPER GASTROINTESTINAL ENDOSCOPY  2015    MILD CHRONIC GASTRITIS    UPPER GASTROINTESTINAL ENDOSCOPY N/A 2019    EGD BIOPSY performed by Carlotta Wong MD at [address] [place] [address]       Previous Medications    CALCIUM CARBONATE 600 MG TABS TABLET    Take 1 tablet by mouth daily    DILTIAZEM (CARDIZEM CD) 180 MG EXTENDED RELEASE CAPSULE    Take 1 capsule by mouth daily    LACTULOSE (CHRONULAC) 10 GM/15ML SOLUTION    take 30 milliliters by mouth four times a day    MAGNESIUM OXIDE (MAG-OX) 400 MG TABLET    Take 1 tablet by mouth daily    OXYGEN    Inhale into the lungs    POTASSIUM CHLORIDE (KLOR-CON M) 20 MEQ EXTENDED RELEASE TABLET    Take 1 tablet by mouth daily    QUETIAPINE (SEROQUEL) 300 MG TABLET    Take 1 tablet by mouth nightly. SERTRALINE (ZOLOFT) 50 MG TABLET    Take 50 mg by mouth daily    VITAMIN B-12 (CYANOCOBALAMIN) 1000 MCG TABLET    Take 1,000 mcg by mouth daily     ALLERGIES     is allergic to chantix [varenicline] and penicillins.   FAMILY HISTORY     She indicated that her mother is alive. She indicated that the status of her father is unknown. She indicated that the status of her brother is unknown. SOCIAL HISTORY       Social History     Tobacco Use    Smoking status: Former Smoker     Packs/day: 0.25     Years: 33.00     Pack years: 8.25     Types: Cigarettes     Start date: 1983     Quit date: 2021     Years since quittin.0    Smokeless tobacco: Never Used    Tobacco comment: quit 6 months ago   Vaping Use    Vaping Use: Never used   Substance Use Topics    Alcohol use: Not Currently     Alcohol/week: 0.0 standard drinks     Comment: last drink 31 days ago    Drug use: No     Comment: none          Noni Chacon MD  The care is provided during an unprecedented national emergency due to the novel coronavirus, COVID 19.   Attending Emergency Physician          Noni Chacon MD  [phone]

## 2022-06-21 NOTE — ED NOTES
[facility] reported they could come pickup pt in an hour. Writer informed physician and PAC who were agreeable to this plan. Pt agreeable to this plan.

## 2022-06-21 NOTE — ED NOTES
[facility]. Pt was agreeable with referral to [address] [place] [address] sent referral to [address] [place] [address] called [facility] and was told pt's referral was accepted and writer would receive call with what time pt could come to Detox.

## 2022-06-21 NOTE — ED PROVIDER NOTES
EMERGENCY DEPARTMENT ENCOUNTER    Pt Name: Crystal Osborne  MRN: [id number]  [place] 1960  Date of evaluation: 6/21/22  CHIEF COMPLAINT       Chief Complaint   Patient presents with    Fall     HISTORY OF PRESENT ILLNESS   The history is provided by the patient. She is not accompanied by anyone, history may be limited by her current mental status (mildly intoxicated). Patient is a recovering alcoholic. 1 month ago she started drinking again to deal with the pain of her mother being in the last days of her life. Since then it has escalated to her drinking more than 1 pint of vodka daily. She states she is currently intoxicated. She presents today with low back pain. States that 5 days ago she tripped over a dog while drunk and landed on her butt. She does not think she hit anything else. Her butt/lower back is the only thing that hurts at this point and she'd like to get it checked out as the pain has persisted. She is also asking for help with her alcoholism. Interested in rehab. REVIEW OF SYSTEMS     Review of Systems   Respiratory: Negative for shortness of breath. Cardiovascular: Negative for chest pain. Gastrointestinal: Negative for abdominal pain. Musculoskeletal: Positive for back pain. Negative for neck pain. Skin:        Multiple bruises on arms and legs   Neurological: Negative for seizures and headaches. Psychiatric/Behavioral: Negative for hallucinations and suicidal ideas.         Acutely intoxicated     PASTMEDICAL HISTORY     Past Medical History:   Diagnosis Date    Alcohol abuse, unspecified     Alcoholic cirrhosis of liver (HCC)     Anxiety     Arrhythmia     Asthma     Bipolar disorder (HCC)     Cirrhosis of liver ([address]     Constipation     COPD (chronic obstructive pulmonary disease) (HCC)     Depression     GERD (gastroesophageal reflux disease)     Hepatic encephalopathy (HCC)     Hepatitis C     Pancreatitis, alcoholic     Pulmonary hypertension ([address]     Seizures ([address]     5 in the past related to alcohol withdrawal     Past Problem List  Patient Active Problem List   Diagnosis Code    Anemia D64.9    Arthritis M19.90     chronic Alcohol abuse F10.10    Hepatitis C, chronic B18.2    Hepatitis C virus infection without hepatic coma B19.20    Serum ammonia increased ([address] E72.20    Major depression ([address] J36.3    Alcoholic cirrhosis ([address] O02.06    Cirrhosis of liver ([address] K74.60    Hepatitis C B19.20    Constipation K59.00    Bipolar disorder ([address] F31.9    Alcohol dependence ([facility] W57.05    Alcoholic cirrhosis of liver without ascites ([address] K70.30    S/P TIPS (transjugular intrahepatic portosystemic shunt) Z95.828    Chronic pancreatitis (HCC) K86.1    PAF (paroxysmal atrial fibrillation) (HCC) I48.0    Abnormal echocardiogram R93.1    Pulmonary HTN (HCC) I27.20    Chronic obstructive pulmonary disease (HCC) J44.9    Right ventricular enlargement I51.7    Right ventricular dysfunction I51.9    Status post placement of implantable loop recorder Z95.818    Encounter for loop recorder check Z45.09    Nonrheumatic mitral valve regurgitation I34.0    LVH (left ventricular hypertrophy) I51.7    Atrial enlargement, bilateral I51.7    Tobacco abuse Z72.0    History of alcohol abuse F10.11    Thrombocytopenia (HCC) D69.6    Dyspnea on exertion R06.00    COVID-19 U07.1    Generalized weakness R53.1     SURGICAL HISTORY       Past Surgical History:   Procedure Laterality Date     SECTION      CHOLECYSTECTOMY      COLONOSCOPY  14    NORMAL    COLONOSCOPY N/A 2019    COLONOSCOPY DIAGNOSTIC performed by Bria Malave MD at [address] [place] [address] ERCP N/A 3/8/2018    ERCP SPINCTER/PAPILLOTOMY; BALLOON SWEEP, STONE EXTRACTION & BIOPSY performed by Bria [facility]ave [facility] at [facility]  2018    [employer]    KNEE ARTHROSCOPY Left 2016    Arthroscopic Chondroplasty, Lateral meniscectomy    SHOULDER SURGERY Left 2019    UPPER GASTROINTESTINAL ENDOSCOPY  14    SCHATZKI RING, MILD CHRONIC GASTRITIS    UPPER GASTROINTESTINAL ENDOSCOPY  2015    MILD CHRONIC GASTRITIS    UPPER GASTROINTESTINAL ENDOSCOPY N/A 2019    EGD BIOPSY performed by Maria Elena Lovell MD at [facility][1]       No current facility-administered medications on file prior to encounter. Current Outpatient Medications on File Prior to Encounter   Medication Sig Dispense Refill    dilTIAZem (CARDIZEM CD) 180 MG extended release capsule Take 1 capsule by mouth daily 30 capsule 0    potassium chloride (KLOR-CON M) 20 MEQ extended release tablet Take 1 tablet by mouth daily 30 tablet 0    magnesium oxide (MAG-OX) 400 MG tablet Take 1 tablet by mouth daily 30 tablet 0    sertraline (ZOLOFT) 50 MG tablet Take 50 mg by mouth daily      OXYGEN Inhale into the lungs      lactulose (CHRONULAC) 10 GM/15ML solution take 30 milliliters by mouth four times a day 3600 mL 3    calcium carbonate 600 MG TABS tablet Take 1 tablet by mouth daily      vitamin B-12 (CYANOCOBALAMIN) 1000 MCG tablet Take 1,000 mcg by mouth daily      QUEtiapine (SEROQUEL) 300 MG tablet Take 1 tablet by mouth nightly. 30 tablet 0     ALLERGIES     is allergic to chantix [varenicline] and penicillins. FAMILY HISTORY     She indicated that her mother is alive. She indicated that the status of her father is unknown. She indicated that the status of her brother is unknown.      SOCIAL HISTORY       Social History     Tobacco Use    Smoking status: Former Smoker     Packs/day: 0.25     Years: 33.00     Pack years: 8.25     Types: Cigarettes     Start date: 1983     Quit date: 2021     Years since quittin.0    Smokeless tobacco: Never Used    Tobacco comment: quit 6 months ago   Vaping Use    Vaping Use: Never used   Substance Use Topics    Alcohol use: Not Currently     Alcohol/week: 0.0 standard drinks     Comment: last drink 31 days ago    Drug use: No     Comment: none     PHYSICAL EXAM     INITIAL VITALS: /70   Pulse (!) 107   Temp 97.5 °F (36.4 °C)   Resp 20   LMP 07/19/2006   SpO2 92%    Physical Exam  Vitals and nursing note reviewed. Constitutional:       General: She is awake. She is not in acute distress. Appearance: She is not toxic-appearing or diaphoretic. HENT:      Head: Normocephalic and atraumatic. Nose: Congestion present. Mouth/Throat:      Mouth: Mucous membranes are moist.   Cardiovascular:      Rate and Rhythm: Regular rhythm. Tachycardia present. Pulmonary:      Effort: Pulmonary effort is normal.      Breath sounds: Normal breath sounds. Abdominal:      Palpations: Abdomen is soft. Tenderness: There is no abdominal tenderness. Musculoskeletal:      Cervical back: Neck supple. No bony tenderness. Thoracic back: No bony tenderness. Lumbar back: Tenderness present. Negative right straight leg raise test and negative left straight leg raise test.        Back:    Skin:     General: Skin is warm and dry. Findings: Bruising present. Comments: Bruising on all 4 extremities   Neurological:      General: No focal deficit present. Mental Status: She is oriented to person, place, and time and easily aroused. Cranial Nerves: No facial asymmetry. Motor: No weakness or seizure activity. Coordination: Coordination abnormal.      Gait: Gait abnormal.   Psychiatric:         Mood and Affect: Affect is tearful. Speech: Speech is delayed. Behavior: Behavior is slowed. Behavior is not agitated or combative. Behavior is cooperative. Thought Content: Thought content does not include homicidal or suicidal ideation. Cognition and Memory: Cognition is impaired. MEDICAL DECISION MAKING:   Fall occurred 5 days ago, but has persistent low back pain. CT scan ordered - no acute fractures seen.  No other areas of tenderness elicited on exam, though her current state of mild intoxication could be masking symptoms. She has good strength and sensation in all 4 extremities. She expressed a desire to get help detoxing from alcohol.  discussed with her and facilitated transfer to [facility] treatment center. She was released to their care. PROCEDURES:    Procedures    DIAGNOSTIC RESULTS   EKG:All EKG's are interpreted by the Emergency Department Physician who either signs or Co-signs this chart in the absence of a cardiologist.    RADIOLOGY:All plain film, CT, MRI, and formal ultrasound images (except ED bedside ultrasound) are read by the radiologist, see reports below, unless otherwisenoted in MDM or here. CT Lumbar Spine WO Contrast   Final Result   No evidence of acute traumatic injury to the lumbar spine. Degenerative changes as described above. No results found. LABS: All lab results were reviewed by myself. Labs Reviewed   COVID-19, RAPID   SPECIMEN REJECTION       EMERGENCY DEPARTMENTCOURSE:   Vitals:    Vitals:    06/21/22 1313 06/21/22 1314   BP:  128/70   Pulse: (!) 107    Resp: 20    Temp:  97.5 °F (36.4 °C)   SpO2: 92%         The patient was given the following medications while in the emergency department:  No orders of the defined types were placed in this encounter. CONSULTS:  None    FINAL IMPRESSION      1. Fall, initial encounter    2. Acute alcoholic intoxication without complication ([address]    3. Acute midline low back pain without sciatica          DISPOSITION/PLAN   DISPOSITION Decision To Discharge 06/21/2022 05:41:52 PM - to the [facility]      Evaluation and treatment course in the ED, and plan of care upon discharge was discussed in length with the patient/patient representative. Patient/patient representative had no further questions prior to being discharged and was instructed to return to the ED for new or worsening symptoms. Any changes to existing medications or new prescriptions were reviewed with patient/patient representative and they expressed understanding of how to correctly take their medications and the possible side effects. PATIENT REFERRED TO:  [address] [place] [address],[address]    Go to       DISCHARGE MEDICATIONS:  Discharge Medication List as of 6/21/2022  5:44 PM        The care is provided during an unprecedented national emergency due to the novel coronavirus, COVID 19.   Loreta Torres PA-C, [place] [address] [place] [address]  06/21/22 1931

## 2022-10-11 ENCOUNTER — TELEPHONE (OUTPATIENT)
Dept: GASTROENTEROLOGY | Age: 62
End: 2022-10-11

## 2023-03-29 ENCOUNTER — HOSPITAL ENCOUNTER (OUTPATIENT)
Age: 63
Discharge: HOME | End: 2023-03-29
Payer: MEDICAID

## 2023-03-29 LAB
ABSOLUTE EOS #: 0.5 K/UL (ref 0–0.4)
ABSOLUTE LYMPH #: 3.1 K/UL (ref 1–4.8)
ABSOLUTE MONO #: 0.5 K/UL (ref 0.1–1.3)
ALBUMIN SERPL-MCNC: 3.9 G/DL (ref 3.5–5.2)
ALP SERPL-CCNC: 162 U/L (ref 35–104)
ALT SERPL-CCNC: 9 U/L (ref 5–33)
ANION GAP SERPL CALCULATED.3IONS-SCNC: 15 MMOL/L (ref 9–17)
AST SERPL-CCNC: 12 U/L
BASOPHILS # BLD: 1 % (ref 0–2)
BASOPHILS ABSOLUTE: 0 K/UL (ref 0–0.2)
BILIRUB SERPL-MCNC: 0.3 MG/DL (ref 0.3–1.2)
BUN SERPL-MCNC: 8 MG/DL (ref 8–23)
CALCIUM SERPL-MCNC: 9.5 MG/DL (ref 8.6–10.4)
CHLORIDE SERPL-SCNC: 93 MMOL/L (ref 98–107)
CO2 SERPL-SCNC: 28 MMOL/L (ref 20–31)
CREAT SERPL-MCNC: <0.4 MG/DL (ref 0.5–0.9)
EOSINOPHILS RELATIVE PERCENT: 5 % (ref 0–4)
GFR SERPL CREATININE-BSD FRML MDRD: ABNORMAL ML/MIN/1.73M2
GLUCOSE SERPL-MCNC: 176 MG/DL (ref 70–99)
HCT VFR BLD AUTO: 40.9 % (ref 36–46)
HGB BLD-MCNC: 13.4 G/DL (ref 12–16)
LYMPHOCYTES # BLD: 30 % (ref 24–44)
MCH RBC QN AUTO: 27.1 PG (ref 26–34)
MCHC RBC AUTO-ENTMCNC: 32.7 G/DL (ref 31–37)
MCV RBC AUTO: 82.8 FL (ref 80–100)
MONOCYTES # BLD: 5 % (ref 1–7)
PDW BLD-RTO: 15.4 % (ref 11.5–14.9)
PLATELET # BLD AUTO: 118 K/UL (ref 150–450)
PMV BLD AUTO: 9.3 FL (ref 6–12)
POTASSIUM SERPL-SCNC: 3.6 MMOL/L (ref 3.7–5.3)
PROT SERPL-MCNC: 7.8 G/DL (ref 6.4–8.3)
RBC # BLD: 4.94 M/UL (ref 4–5.2)
SEG NEUTROPHILS: 59 % (ref 36–66)
SEGMENTED NEUTROPHILS ABSOLUTE COUNT: 6 K/UL (ref 1.3–9.1)
SODIUM SERPL-SCNC: 136 MMOL/L (ref 135–144)
WBC # BLD AUTO: 10.1 K/UL (ref 3.5–11)

## 2023-03-29 PROCEDURE — 36415 COLL VENOUS BLD VENIPUNCTURE: CPT

## 2023-03-29 PROCEDURE — 85025 COMPLETE CBC W/AUTO DIFF WBC: CPT

## 2023-03-29 PROCEDURE — 80053 COMPREHEN METABOLIC PANEL: CPT

## 2023-03-30 ENCOUNTER — HOSPITAL ENCOUNTER (OUTPATIENT)
Dept: INFUSION THERAPY | Age: 63
Setting detail: INFUSION SERIES
Discharge: HOME | End: 2023-03-30
Payer: MEDICAID

## 2023-03-30 VITALS
DIASTOLIC BLOOD PRESSURE: 66 MMHG | OXYGEN SATURATION: 94 % | SYSTOLIC BLOOD PRESSURE: 100 MMHG | HEART RATE: 91 BPM | TEMPERATURE: 97 F | RESPIRATION RATE: 18 BRPM

## 2023-03-30 PROCEDURE — 36430 TRANSFUSION BLD/BLD COMPNT: CPT

## 2023-03-30 PROCEDURE — 6360000002 HC RX W HCPCS: Performed by: INTERNAL MEDICINE

## 2023-03-30 PROCEDURE — 36591 DRAW BLOOD OFF VENOUS DEVICE: CPT

## 2023-03-30 RX ORDER — HEPARIN SODIUM (PORCINE) LOCK FLUSH IV SOLN 100 UNIT/ML 100 UNIT/ML
500 SOLUTION INTRAVENOUS ONCE
Status: COMPLETED | OUTPATIENT
Start: 2023-03-30 | End: 2023-03-30

## 2023-03-30 RX ORDER — SODIUM CHLORIDE 9 MG/ML
INJECTION, SOLUTION INTRAVENOUS PRN
Status: DISCONTINUED | OUTPATIENT
Start: 2023-03-30 | End: 2023-03-31 | Stop reason: HOSPADM

## 2023-03-30 RX ADMIN — HEPARIN 500 UNITS: 100 SYRINGE at 16:23

## 2023-03-30 NOTE — PROGRESS NOTES
Pt arrived for platelet infusion. L chest port accessed without complications. Labs easily drawn from port. Vitals obtained. Platelets started at 15:54, titrated per protocol, and ended at 16:31. Pt monitored continuously throughout transfusion. No s/s adverse reaction noted. Pt tolerated well. Vitals remained stable as charted. Port heparin locked and deaccessed. Pt discharged home, via wheelchair with RN assistance.

## 2023-04-06 ENCOUNTER — TELEPHONE (OUTPATIENT)
Dept: INFUSION THERAPY | Age: 63
End: 2023-04-06

## 2023-04-06 NOTE — TELEPHONE ENCOUNTER
Writer called patient due to missed appointment this afternoon. A male family member answered and explained that the patient had passed away today. Scheduling notified and appointment removed from schedule. Dr. Johnson Burn office notified and blood bank notified.

## (undated) DEVICE — RETRIEVAL BALLOON CATHETER: Brand: EXTRACTOR™ PRO RX

## (undated) DEVICE — JELLY,LUBE,STERILE,FLIP TOP,TUBE,2-OZ: Brand: MEDLINE

## (undated) DEVICE — BITEBLOCK 54FR W/ DENT RIM BLOX

## (undated) DEVICE — DEFENDO AIR WATER SUCTION AND BIOPSY VALVE KIT FOR  OLYMPUS: Brand: DEFENDO AIR/WATER/SUCTION AND BIOPSY VALVE

## (undated) DEVICE — SPHINCTEROTOME: Brand: JAGTOME RX 39

## (undated) DEVICE — SYRINGE MED 50ML LUERSLIP TIP

## (undated) DEVICE — YANKAUER,BULB TIP,W/O VENT,RIGID,STERILE: Brand: MEDLINE

## (undated) DEVICE — TUBING, SUCTION, 3/16" X 10', STRAIGHT: Brand: MEDLINE

## (undated) DEVICE — AIRLIFE™ NASAL OXYGEN CANNULA CURVED, FLARED TIP, WITH 7 FEET (2.1 M) CRUSH RESISTANT TUBING, OVER-THE-EAR STYLE: Brand: AIRLIFE™

## (undated) DEVICE — FORCEPS BX L240CM WRK CHN 2.8MM STD CAP W/ NDL MIC MESH

## (undated) DEVICE — TUBE ET DIA7.5MM ORAL NSL CUF MURPHY EYE HI LO RADPQ LN

## (undated) DEVICE — SYSTEM BX CAP BILI RAP EXCHG CAP LOK DEV COMPATIBLE W/ OLY

## (undated) DEVICE — 1200CC GUARDIAN II: Brand: GUARDIAN

## (undated) DEVICE — GOWN,POLY REINFORCED,LG: Brand: MEDLINE